# Patient Record
Sex: FEMALE | Race: BLACK OR AFRICAN AMERICAN | Employment: FULL TIME | URBAN - METROPOLITAN AREA
[De-identification: names, ages, dates, MRNs, and addresses within clinical notes are randomized per-mention and may not be internally consistent; named-entity substitution may affect disease eponyms.]

---

## 2021-07-15 ENCOUNTER — OFFICE VISIT (OUTPATIENT)
Dept: INTERNAL MEDICINE CLINIC | Facility: CLINIC | Age: 76
End: 2021-07-15
Payer: COMMERCIAL

## 2021-07-15 ENCOUNTER — LAB ENCOUNTER (OUTPATIENT)
Dept: LAB | Facility: HOSPITAL | Age: 76
End: 2021-07-15
Attending: INTERNAL MEDICINE
Payer: COMMERCIAL

## 2021-07-15 VITALS
WEIGHT: 205 LBS | RESPIRATION RATE: 14 BRPM | BODY MASS INDEX: 35.87 KG/M2 | HEART RATE: 110 BPM | SYSTOLIC BLOOD PRESSURE: 132 MMHG | DIASTOLIC BLOOD PRESSURE: 80 MMHG | OXYGEN SATURATION: 98 % | HEIGHT: 63.5 IN

## 2021-07-15 DIAGNOSIS — Z00.00 ADULT GENERAL MEDICAL EXAM: ICD-10-CM

## 2021-07-15 DIAGNOSIS — E11.9 TYPE 2 DIABETES MELLITUS WITHOUT COMPLICATION, WITHOUT LONG-TERM CURRENT USE OF INSULIN (HCC): ICD-10-CM

## 2021-07-15 DIAGNOSIS — I10 ESSENTIAL HYPERTENSION: ICD-10-CM

## 2021-07-15 DIAGNOSIS — R22.0 MILD TONGUE SWELLING: Primary | ICD-10-CM

## 2021-07-15 DIAGNOSIS — Z12.31 BREAST CANCER SCREENING BY MAMMOGRAM: ICD-10-CM

## 2021-07-15 LAB
ALBUMIN SERPL-MCNC: 3.4 G/DL (ref 3.4–5)
ALBUMIN/GLOB SERPL: 0.7 {RATIO} (ref 1–2)
ALP LIVER SERPL-CCNC: 129 U/L
ALT SERPL-CCNC: 30 U/L
ANION GAP SERPL CALC-SCNC: 7 MMOL/L (ref 0–18)
AST SERPL-CCNC: 24 U/L (ref 15–37)
BILIRUB SERPL-MCNC: 0.4 MG/DL (ref 0.1–2)
BUN BLD-MCNC: 13 MG/DL (ref 7–18)
BUN/CREAT SERPL: 15.5 (ref 10–20)
CALCIUM BLD-MCNC: 9.2 MG/DL (ref 8.5–10.1)
CHLORIDE SERPL-SCNC: 105 MMOL/L (ref 98–112)
CHOLEST SMN-MCNC: 174 MG/DL (ref ?–200)
CO2 SERPL-SCNC: 26 MMOL/L (ref 21–32)
CREAT BLD-MCNC: 0.84 MG/DL
CREAT UR-SCNC: 119 MG/DL
EST. AVERAGE GLUCOSE BLD GHB EST-MCNC: 243 MG/DL (ref 68–126)
GLOBULIN PLAS-MCNC: 4.6 G/DL (ref 2.8–4.4)
GLUCOSE BLD-MCNC: 163 MG/DL (ref 70–99)
HBA1C MFR BLD HPLC: 10.1 % (ref ?–5.7)
HDLC SERPL-MCNC: 56 MG/DL (ref 40–59)
LDLC SERPL CALC-MCNC: 100 MG/DL (ref ?–100)
M PROTEIN MFR SERPL ELPH: 8 G/DL (ref 6.4–8.2)
MICROALBUMIN UR-MCNC: 6.67 MG/DL
MICROALBUMIN/CREAT 24H UR-RTO: 56.1 UG/MG (ref ?–30)
NONHDLC SERPL-MCNC: 118 MG/DL (ref ?–130)
OSMOLALITY SERPL CALC.SUM OF ELEC: 290 MOSM/KG (ref 275–295)
PATIENT FASTING Y/N/NP: NO
PATIENT FASTING Y/N/NP: NO
POTASSIUM SERPL-SCNC: 3.9 MMOL/L (ref 3.5–5.1)
SODIUM SERPL-SCNC: 138 MMOL/L (ref 136–145)
TRIGL SERPL-MCNC: 102 MG/DL (ref 30–149)
TSI SER-ACNC: 1.18 MIU/ML (ref 0.36–3.74)
VLDLC SERPL CALC-MCNC: 17 MG/DL (ref 0–30)

## 2021-07-15 PROCEDURE — 3060F POS MICROALBUMINURIA REV: CPT | Performed by: INTERNAL MEDICINE

## 2021-07-15 PROCEDURE — 85027 COMPLETE CBC AUTOMATED: CPT

## 2021-07-15 PROCEDURE — 80053 COMPREHEN METABOLIC PANEL: CPT

## 2021-07-15 PROCEDURE — 99204 OFFICE O/P NEW MOD 45 MIN: CPT | Performed by: INTERNAL MEDICINE

## 2021-07-15 PROCEDURE — 3079F DIAST BP 80-89 MM HG: CPT | Performed by: INTERNAL MEDICINE

## 2021-07-15 PROCEDURE — 3008F BODY MASS INDEX DOCD: CPT | Performed by: INTERNAL MEDICINE

## 2021-07-15 PROCEDURE — 80061 LIPID PANEL: CPT

## 2021-07-15 PROCEDURE — 3075F SYST BP GE 130 - 139MM HG: CPT | Performed by: INTERNAL MEDICINE

## 2021-07-15 PROCEDURE — 84443 ASSAY THYROID STIM HORMONE: CPT

## 2021-07-15 PROCEDURE — 85060 BLOOD SMEAR INTERPRETATION: CPT

## 2021-07-15 PROCEDURE — 3061F NEG MICROALBUMINURIA REV: CPT | Performed by: INTERNAL MEDICINE

## 2021-07-15 PROCEDURE — 3046F HEMOGLOBIN A1C LEVEL >9.0%: CPT | Performed by: INTERNAL MEDICINE

## 2021-07-15 PROCEDURE — 82043 UR ALBUMIN QUANTITATIVE: CPT

## 2021-07-15 PROCEDURE — 82570 ASSAY OF URINE CREATININE: CPT

## 2021-07-15 PROCEDURE — 83036 HEMOGLOBIN GLYCOSYLATED A1C: CPT

## 2021-07-15 RX ORDER — ASPIRIN 81 MG/1
81 TABLET, CHEWABLE ORAL DAILY
COMMUNITY

## 2021-07-15 RX ORDER — FAMOTIDINE 20 MG/1
20 TABLET ORAL 2 TIMES DAILY
Qty: 30 TABLET | Refills: 0 | Status: SHIPPED | OUTPATIENT
Start: 2021-07-15 | End: 2021-07-22

## 2021-07-15 RX ORDER — EPINEPHRINE 0.3 MG/.3ML
0.3 INJECTION SUBCUTANEOUS ONCE
Qty: 1 EACH | Refills: 0 | Status: SHIPPED | OUTPATIENT
Start: 2021-07-15 | End: 2021-07-15

## 2021-07-15 RX ORDER — MAG HYDROX/ALUMINUM HYD/SIMETH 400-400-40
5000 SUSPENSION, ORAL (FINAL DOSE FORM) ORAL DAILY
COMMUNITY

## 2021-07-15 RX ORDER — PREDNISONE 20 MG/1
TABLET ORAL
Qty: 11 TABLET | Refills: 0 | Status: SHIPPED | OUTPATIENT
Start: 2021-07-15 | End: 2021-11-22 | Stop reason: ALTCHOICE

## 2021-07-15 RX ORDER — AMLODIPINE BESYLATE 10 MG/1
10 TABLET ORAL DAILY
COMMUNITY
End: 2021-11-09

## 2021-07-15 RX ORDER — GLIMEPIRIDE 4 MG/1
4 TABLET ORAL DAILY
COMMUNITY
Start: 2021-07-08 | End: 2021-11-09

## 2021-07-15 RX ORDER — AMPICILLIN TRIHYDRATE 250 MG
500 CAPSULE ORAL DAILY
COMMUNITY
End: 2021-11-22 | Stop reason: ALTCHOICE

## 2021-07-15 NOTE — PROGRESS NOTES
Teofilo Mckeon is a 77year old female. Patient presents with:  Establish Care: NP here to establish Care   Follow - Up: Tongue swollen since last night     HPI:   78-year-old pleasant lady with a past medical history of diabetes, hypertension here to estab Use: Never used    Alcohol use: Not Currently      Comment: socially drinks     Drug use: Never     Family History   Problem Relation Age of Onset   • Hypertension Mother    • Hypertension Father         Lisinopril              TONGUE SWELLING     REVIEW O Tenderness: There is no abdominal tenderness. Musculoskeletal:      Cervical back: Neck supple. Right lower leg: No edema. Left lower leg: No edema. Skin:     General: Skin is warm and dry.    Neurological:      General: No focal deficit prese ordered. We will try to obtain the colonoscopy report.   If everything is okay, I will see her back in few months for physical.  I have given her clear instruction to call 911 if she develops any tongue swelling that leads to shortness of breath or dysphag

## 2021-07-15 NOTE — PROGRESS NOTES
Colonoscopy done at [facility] on 2019 with Wood Dawson patient Normal and Repeat in 2023      Received [address] [place] but doesn't Recall dates,will call the office back with dates.

## 2021-07-16 ENCOUNTER — TELEPHONE (OUTPATIENT)
Dept: INTERNAL MEDICINE CLINIC | Facility: CLINIC | Age: 76
End: 2021-07-16

## 2021-07-16 LAB
DEPRECATED RDW RBC AUTO: 51.7 FL (ref 35.1–46.3)
ERYTHROCYTE [DISTWIDTH] IN BLOOD BY AUTOMATED COUNT: 17.6 % (ref 11–15)
HCT VFR BLD AUTO: 49.4 %
HGB BLD-MCNC: 15.5 G/DL
MCH RBC QN AUTO: 26 PG (ref 26–34)
MCHC RBC AUTO-ENTMCNC: 31.4 G/DL (ref 31–37)
MCV RBC AUTO: 82.9 FL
PLATELET # BLD AUTO: 641 10(3)UL (ref 150–450)
RBC # BLD AUTO: 5.96 X10(6)UL
WBC # BLD AUTO: 12.2 X10(3) UL (ref 4–11)

## 2021-07-16 NOTE — TELEPHONE ENCOUNTER
Summer Lan from [facility] calling to ask if it is okay to change Epipen rx to [address] because it is cheaper for the patient. It is the same concentration as Epipen.     EPINEPHrine (EPIPEN 2-SKIP) 0.3 MG/0.3ML Injection Solution Auto-injector 1 each 0 7/15/2021 7

## 2021-07-20 ENCOUNTER — HOSPITAL ENCOUNTER (OUTPATIENT)
Dept: MAMMOGRAPHY | Facility: HOSPITAL | Age: 76
Discharge: HOME | End: 2021-07-20
Attending: INTERNAL MEDICINE
Payer: COMMERCIAL

## 2021-07-20 DIAGNOSIS — Z12.31 BREAST CANCER SCREENING BY MAMMOGRAM: ICD-10-CM

## 2021-07-20 PROCEDURE — 77063 BREAST TOMOSYNTHESIS BI: CPT | Performed by: INTERNAL MEDICINE

## 2021-07-20 PROCEDURE — 77067 SCR MAMMO BI INCL CAD: CPT | Performed by: INTERNAL MEDICINE

## 2021-07-22 RX ORDER — FAMOTIDINE 20 MG/1
TABLET ORAL
Qty: 30 TABLET | Refills: 0 | Status: SHIPPED | OUTPATIENT
Start: 2021-07-22 | End: 2021-11-22 | Stop reason: ALTCHOICE

## 2021-07-27 ENCOUNTER — APPOINTMENT (OUTPATIENT)
Dept: HEMATOLOGY/ONCOLOGY | Facility: HOSPITAL | Age: 76
End: 2021-07-27
Attending: INTERNAL MEDICINE
Payer: COMMERCIAL

## 2021-07-27 VITALS
SYSTOLIC BLOOD PRESSURE: 167 MMHG | HEART RATE: 103 BPM | OXYGEN SATURATION: 98 % | HEIGHT: 63.5 IN | TEMPERATURE: 98 F | RESPIRATION RATE: 16 BRPM | BODY MASS INDEX: 35.52 KG/M2 | WEIGHT: 203 LBS | DIASTOLIC BLOOD PRESSURE: 89 MMHG

## 2021-07-27 DIAGNOSIS — D72.9 NEUTROPHILIC LEUKOCYTOSIS: ICD-10-CM

## 2021-07-27 DIAGNOSIS — D75.839 THROMBOCYTOSIS: Primary | ICD-10-CM

## 2021-07-27 PROCEDURE — 99244 OFF/OP CNSLTJ NEW/EST MOD 40: CPT | Performed by: INTERNAL MEDICINE

## 2021-07-27 NOTE — CONSULTS
[facility] History and Physical    Patient Name: Alfredo Echols   YOB: 1945   Medical Record Number: [id number]   CSN: [id number]   Attending Physician:  Sharon Young MD       Date of Visit: 7/27/2021     Chief Complaint:   Thrombocytosis, le melanoma)    Social History:  Marital status: lives with sister and niece, one son, lives out of town  Smoking: non smoker  ETOH: hardly ever  Work: ([employer])    Current Medications:    Current Outpatient Medications:   •  FAMOTIDIN affect      Laboratory reviewed:    Lab Results   Component Value Date    WBC 12.2 (H) 07/15/2021    RBC 5.96 (H) 07/15/2021    HGB 15.5 07/15/2021    HCT 49.4 (H) 07/15/2021    MCV 82.9 07/15/2021    MCH 26.0 07/15/2021    MCHC 31.4 07/15/2021    RDW 17. 6

## 2021-08-09 RX ORDER — FAMOTIDINE 20 MG/1
TABLET ORAL
Qty: 30 TABLET | Refills: 0 | OUTPATIENT
Start: 2021-08-09

## 2021-08-09 NOTE — TELEPHONE ENCOUNTER
Called patient to inquire if she is supposed to be taking pepcid daily? Per the office visit notes, it appears to be only prescribed once for an episode of mild tongue swellin.  Mild tongue swelling   -She reports that her tongue was swollen today

## 2021-08-10 NOTE — TELEPHONE ENCOUNTER
Patient contacted@ [phone]. Informed she no longer needs to take famotadine. Patient verbalized understanding.

## 2021-08-14 ENCOUNTER — LAB ENCOUNTER (OUTPATIENT)
Dept: LAB | Facility: HOSPITAL | Age: 76
End: 2021-08-14
Attending: INTERNAL MEDICINE
Payer: COMMERCIAL

## 2021-08-14 DIAGNOSIS — D75.839 THROMBOCYTOSIS: ICD-10-CM

## 2021-08-14 LAB
BASOPHILS # BLD AUTO: 0.09 X10(3) UL (ref 0–0.2)
BASOPHILS NFR BLD AUTO: 1 %
DEPRECATED HBV CORE AB SER IA-ACNC: 16.6 NG/ML
DEPRECATED RDW RBC AUTO: 56.3 FL (ref 35.1–46.3)
EOSINOPHIL # BLD AUTO: 0.4 X10(3) UL (ref 0–0.7)
EOSINOPHIL NFR BLD AUTO: 4.3 %
ERYTHROCYTE [DISTWIDTH] IN BLOOD BY AUTOMATED COUNT: 18.9 % (ref 11–15)
ERYTHROCYTE [SEDIMENTATION RATE] IN BLOOD: 10 MM/HR
HCT VFR BLD AUTO: 49.4 %
HGB BLD-MCNC: 15.3 G/DL
HGB RETIC QN AUTO: 29.6 PG (ref 28.2–36.6)
IGA SERPL-MCNC: 297 MG/DL (ref 70–312)
IGM SERPL-MCNC: 100 MG/DL (ref 43–279)
IMM GRANULOCYTES # BLD AUTO: 0.1 X10(3) UL (ref 0–1)
IMM GRANULOCYTES NFR BLD: 1.1 %
IMM RETICS NFR: 0.31 RATIO (ref 0.1–0.3)
IMMUNOGLOBULIN PNL SER-MCNC: 1490 MG/DL (ref 791–1643)
IRON SATURATION: 10 %
IRON SERPL-MCNC: 37 UG/DL
LYMPHOCYTES # BLD AUTO: 2.38 X10(3) UL (ref 1–4)
LYMPHOCYTES NFR BLD AUTO: 25.7 %
MCH RBC QN AUTO: 26.1 PG (ref 26–34)
MCHC RBC AUTO-ENTMCNC: 31 G/DL (ref 31–37)
MCV RBC AUTO: 84.3 FL
MONOCYTES # BLD AUTO: 0.97 X10(3) UL (ref 0.1–1)
MONOCYTES NFR BLD AUTO: 10.5 %
NEUTROPHILS # BLD AUTO: 5.31 X10 (3) UL (ref 1.5–7.7)
NEUTROPHILS # BLD AUTO: 5.31 X10(3) UL (ref 1.5–7.7)
NEUTROPHILS NFR BLD AUTO: 57.4 %
PLATELET # BLD AUTO: 689 10(3)UL (ref 150–450)
RBC # BLD AUTO: 5.86 X10(6)UL
RETICS # AUTO: 85.6 X10(3) UL (ref 22.5–147.5)
RETICS/RBC NFR AUTO: 1.5 %
TOTAL IRON BINDING CAPACITY: 368 UG/DL (ref 240–450)
TRANSFERRIN SERPL-MCNC: 247 MG/DL (ref 200–360)
VIT B12 SERPL-MCNC: 387 PG/ML (ref 193–986)
WBC # BLD AUTO: 9.3 X10(3) UL (ref 4–11)

## 2021-08-14 PROCEDURE — 82668 ASSAY OF ERYTHROPOIETIN: CPT

## 2021-08-14 PROCEDURE — 86225 DNA ANTIBODY NATIVE: CPT

## 2021-08-14 PROCEDURE — 85045 AUTOMATED RETICULOCYTE COUNT: CPT

## 2021-08-14 PROCEDURE — 82728 ASSAY OF FERRITIN: CPT

## 2021-08-14 PROCEDURE — 36415 COLL VENOUS BLD VENIPUNCTURE: CPT

## 2021-08-14 PROCEDURE — 85652 RBC SED RATE AUTOMATED: CPT

## 2021-08-14 PROCEDURE — 82785 ASSAY OF IGE: CPT

## 2021-08-14 PROCEDURE — 86334 IMMUNOFIX E-PHORESIS SERUM: CPT

## 2021-08-14 PROCEDURE — 86039 ANTINUCLEAR ANTIBODIES (ANA): CPT

## 2021-08-14 PROCEDURE — 82784 ASSAY IGA/IGD/IGG/IGM EACH: CPT

## 2021-08-14 PROCEDURE — 84165 PROTEIN E-PHORESIS SERUM: CPT

## 2021-08-14 PROCEDURE — 86038 ANTINUCLEAR ANTIBODIES: CPT

## 2021-08-14 PROCEDURE — 83540 ASSAY OF IRON: CPT

## 2021-08-14 PROCEDURE — 86235 NUCLEAR ANTIGEN ANTIBODY: CPT

## 2021-08-14 PROCEDURE — 84466 ASSAY OF TRANSFERRIN: CPT

## 2021-08-14 PROCEDURE — 83883 ASSAY NEPHELOMETRY NOT SPEC: CPT

## 2021-08-14 PROCEDURE — 82607 VITAMIN B-12: CPT

## 2021-08-14 PROCEDURE — 85025 COMPLETE CBC W/AUTO DIFF WBC: CPT

## 2021-08-15 LAB — ERYTHROPOIETIN (EPO): 4 MU/ML

## 2021-08-16 LAB
IGE SERPL-ACNC: 818 KU/L (ref 2–214)
KAPPA LC FREE SER-MCNC: 2.83 MG/DL (ref 0.33–1.94)
KAPPA LC FREE/LAMBDA FREE SER NEPH: 1.4 {RATIO} (ref 0.26–1.65)
LAMBDA LC FREE SERPL-MCNC: 2.02 MG/DL (ref 0.57–2.63)
NUCLEAR IGG TITR SER IF: POSITIVE {TITER}

## 2021-08-17 LAB
ALBUMIN SERPL ELPH-MCNC: 3.63 G/DL (ref 3.75–5.21)
ALBUMIN/GLOB SERPL: 1.04 {RATIO} (ref 1–2)
ALPHA1 GLOB SERPL ELPH-MCNC: 0.33 G/DL (ref 0.19–0.46)
ALPHA2 GLOB SERPL ELPH-MCNC: 0.88 G/DL (ref 0.48–1.05)
B-GLOBULIN SERPL ELPH-MCNC: 1.02 G/DL (ref 0.68–1.23)
GAMMA GLOB SERPL ELPH-MCNC: 1.24 G/DL (ref 0.62–1.7)
M PROTEIN MFR SERPL ELPH: 7.1 G/DL (ref 6.4–8.2)

## 2021-08-18 LAB
DSDNA AB TITR SER: <10 {TITER}
ENA SM IGG SER QL: NEGATIVE
ENA SM+RNP AB SER QL: NEGATIVE
ENA SS-A AB SER QL IA: NEGATIVE
ENA SS-B AB SER QL IA: NEGATIVE

## 2021-08-19 LAB — ANA NUCLEOLAR TITR SER IF: 80 {TITER}

## 2021-08-20 ENCOUNTER — TELEPHONE (OUTPATIENT)
Dept: HEMATOLOGY/ONCOLOGY | Facility: HOSPITAL | Age: 76
End: 2021-08-20

## 2021-08-20 NOTE — TELEPHONE ENCOUNTER
Patient notified that her vitamin B12 and ESR are normal.    WBC is now normal.  Platelets still elevated in the 600s. She is iron deficient, but not anemic. Her BERNADETTE work-up is negative. Her IgG/A/M and ROSY is normal but Ig E is elevated.   Will h

## 2021-08-20 NOTE — TELEPHONE ENCOUNTER
Patient got blood work recently and she called to ask Dr. Steven Burciaga to call her with the results. Please call patient. Thank you.

## 2021-08-23 ENCOUNTER — TELEPHONE (OUTPATIENT)
Dept: HEMATOLOGY/ONCOLOGY | Facility: HOSPITAL | Age: 76
End: 2021-08-23

## 2021-08-23 DIAGNOSIS — D75.839 THROMBOCYTOSIS: Primary | ICD-10-CM

## 2021-08-23 NOTE — TELEPHONE ENCOUNTER
LVMTCB    Labs show iron deficiency, resolution of elevated WBC. Start iron tabs, repeat CBC and iron studies in three mo along with IgE (elevated likely due to her allergic reaction). BERNADETTE midly elevated, monitor, does not appear to be significant.      Elizabeth Finnegan

## 2021-09-16 ENCOUNTER — PATIENT MESSAGE (OUTPATIENT)
Dept: ENDOCRINOLOGY CLINIC | Facility: CLINIC | Age: 76
End: 2021-09-16

## 2021-09-16 NOTE — TELEPHONE ENCOUNTER
Replied to patient's message. Dr. Burgess Zaragoza booked on 9/29. Awaiting patient response to see if she wants to reschedule for a different date.

## 2021-09-16 NOTE — TELEPHONE ENCOUNTER
From: Yanique Abbott  To: Henry Santo MD  Sent: 9/16/2021 8:34 AM CDT  Subject: Appointment change    I would like to change my appointment on September 29, 2021 from 11:30a to 2:30p.

## 2021-09-24 NOTE — TELEPHONE ENCOUNTER
Since she needs a later time, can please reschedule to first available/ can also get on the wait list if she likes  Thanks

## 2021-09-24 NOTE — TELEPHONE ENCOUNTER
Advised patient to call scheduling to reschedule appointment. Per Dr. Pichardo Lav: first available.

## 2021-09-24 NOTE — TELEPHONE ENCOUNTER
Dr. Amairani Anderson    Please see patient's message and advise. Consult appt canceled on 9/29. Do you want to overbook or see patient first available?

## 2021-09-24 NOTE — TELEPHONE ENCOUNTER
Replied to patient's Takumii Swedenhart message with first available per Dr. Martina Gutierrez below. Awaiting response.

## 2021-11-09 RX ORDER — GLIMEPIRIDE 4 MG/1
TABLET ORAL
Qty: 90 TABLET | Refills: 2 | Status: SHIPPED | OUTPATIENT
Start: 2021-11-09

## 2021-11-09 RX ORDER — AMLODIPINE BESYLATE 10 MG/1
TABLET ORAL
Qty: 90 TABLET | Refills: 2 | Status: SHIPPED | OUTPATIENT
Start: 2021-11-09

## 2021-11-17 ENCOUNTER — HOSPITAL ENCOUNTER (EMERGENCY)
Facility: HOSPITAL | Age: 76
Discharge: HOME | End: 2021-11-17
Payer: COMMERCIAL

## 2021-11-17 ENCOUNTER — APPOINTMENT (OUTPATIENT)
Dept: GENERAL RADIOLOGY | Facility: HOSPITAL | Age: 76
End: 2021-11-17
Payer: COMMERCIAL

## 2021-11-17 ENCOUNTER — LAB ENCOUNTER (OUTPATIENT)
Dept: LAB | Facility: HOSPITAL | Age: 76
End: 2021-11-17
Attending: INTERNAL MEDICINE
Payer: COMMERCIAL

## 2021-11-17 ENCOUNTER — APPOINTMENT (OUTPATIENT)
Dept: ULTRASOUND IMAGING | Facility: HOSPITAL | Age: 76
End: 2021-11-17
Attending: EMERGENCY MEDICINE
Payer: COMMERCIAL

## 2021-11-17 VITALS
OXYGEN SATURATION: 98 % | WEIGHT: 200 LBS | BODY MASS INDEX: 35 KG/M2 | DIASTOLIC BLOOD PRESSURE: 78 MMHG | RESPIRATION RATE: 18 BRPM | HEART RATE: 98 BPM | SYSTOLIC BLOOD PRESSURE: 152 MMHG | TEMPERATURE: 97 F

## 2021-11-17 DIAGNOSIS — M25.562 LEFT KNEE PAIN, UNSPECIFIED CHRONICITY: Primary | ICD-10-CM

## 2021-11-17 DIAGNOSIS — D72.9 NEUTROPHILIC LEUKOCYTOSIS: ICD-10-CM

## 2021-11-17 DIAGNOSIS — D75.839 THROMBOCYTOSIS: ICD-10-CM

## 2021-11-17 PROCEDURE — 73560 X-RAY EXAM OF KNEE 1 OR 2: CPT

## 2021-11-17 PROCEDURE — 83540 ASSAY OF IRON: CPT

## 2021-11-17 PROCEDURE — 99284 EMERGENCY DEPT VISIT MOD MDM: CPT

## 2021-11-17 PROCEDURE — 85025 COMPLETE CBC W/AUTO DIFF WBC: CPT

## 2021-11-17 PROCEDURE — 36415 COLL VENOUS BLD VENIPUNCTURE: CPT

## 2021-11-17 PROCEDURE — 82785 ASSAY OF IGE: CPT

## 2021-11-17 PROCEDURE — 84466 ASSAY OF TRANSFERRIN: CPT

## 2021-11-17 PROCEDURE — 93971 EXTREMITY STUDY: CPT | Performed by: EMERGENCY MEDICINE

## 2021-11-17 RX ORDER — NAPROXEN 500 MG/1
500 TABLET ORAL 2 TIMES DAILY PRN
Qty: 14 TABLET | Refills: 0 | Status: SHIPPED | OUTPATIENT
Start: 2021-11-17 | End: 2021-11-24

## 2021-11-17 NOTE — ED PROVIDER NOTES
Patient Seen in: [facility] AND [facility] Emergency Department      History   Patient presents with:  Knee Pain    Stated Complaint: L knee pain    Subjective:   HPI    Patient is a 73-year-old female that complains of 2 weeks of discomfort in her left knee sta to display       XR KNEE (1 OR 2 VIEWS), LEFT (CPT=73560)    Result Date: 11/17/2021  CONCLUSION:  1. Mild tricompartmental osteoarthritis of the left knee.     Dictated by (CST): Gloria Nuñez MD on 11/17/2021 at 11:22 AM     Finalized by (CST):

## 2021-11-17 NOTE — ED QUICK NOTES
Pt states pain with ambulation to lt knee with some swelling, mostly to the outside x 2 weeks. States took ibuprofen once it did help a little but not much. States has been wearing a knee brace, which seems it help some. Denies injury or trauma.  Denies brittany

## 2021-11-17 NOTE — ED INITIAL ASSESSMENT (HPI)
Patient states her left knee started to have pain x 2 weeks ago. With some swelling. She is ambulatory in triage and has been wearing a knee brace, denies trauma, pain worse with movement.

## 2021-11-22 ENCOUNTER — OFFICE VISIT (OUTPATIENT)
Dept: HEMATOLOGY/ONCOLOGY | Facility: HOSPITAL | Age: 76
End: 2021-11-22
Attending: INTERNAL MEDICINE
Payer: COMMERCIAL

## 2021-11-22 ENCOUNTER — LAB ENCOUNTER (OUTPATIENT)
Dept: LAB | Facility: HOSPITAL | Age: 76
End: 2021-11-22
Attending: INTERNAL MEDICINE
Payer: COMMERCIAL

## 2021-11-22 VITALS
BODY MASS INDEX: 35.17 KG/M2 | DIASTOLIC BLOOD PRESSURE: 76 MMHG | OXYGEN SATURATION: 97 % | RESPIRATION RATE: 18 BRPM | HEART RATE: 90 BPM | TEMPERATURE: 99 F | SYSTOLIC BLOOD PRESSURE: 140 MMHG | WEIGHT: 201 LBS | HEIGHT: 63.5 IN

## 2021-11-22 DIAGNOSIS — D75.1 POLYCYTHEMIA: Primary | ICD-10-CM

## 2021-11-22 DIAGNOSIS — D75.839 THROMBOCYTOSIS: ICD-10-CM

## 2021-11-22 DIAGNOSIS — D75.1 POLYCYTHEMIA: ICD-10-CM

## 2021-11-22 PROCEDURE — 99214 OFFICE O/P EST MOD 30 MIN: CPT | Performed by: INTERNAL MEDICINE

## 2021-11-22 PROCEDURE — 81270 JAK2 GENE: CPT

## 2021-11-22 PROCEDURE — 36415 COLL VENOUS BLD VENIPUNCTURE: CPT

## 2021-11-22 RX ORDER — BUPRENORPHINE HCL 8 MG/1
1 TABLET SUBLINGUAL DAILY
COMMUNITY
End: 2022-01-04

## 2021-11-22 NOTE — PROGRESS NOTES
Cancer Center Progress Note    Patient Name: Yanique Abbott   YOB: 1945   Medical Record Number: [id number]   Attending Physician: Chloé Christie M.D. Chief Complaint:   Thrombocytosis, leukocytosis     Oncology History:  77year old seen for 140/76 (BP Location: Left arm, Patient Position: Sitting, Cuff Size: large)   Pulse 90   Temp 99.1 °F (37.3 °C) (Oral)   Resp 18   Ht 1.613 m (5' 3.5\")   Wt 91.2 kg (201 lb)   SpO2 97%   BMI 35.05 kg/m²     Physical Examination:  Performance Status:  Gene

## 2021-12-06 ENCOUNTER — TELEPHONE (OUTPATIENT)
Dept: HEMATOLOGY/ONCOLOGY | Facility: HOSPITAL | Age: 76
End: 2021-12-06

## 2021-12-06 NOTE — TELEPHONE ENCOUNTER
Scheduled lorena for Monday 12/13 to meet with Dr Branden Alonzo and review the results of her blood test.

## 2021-12-13 ENCOUNTER — OFFICE VISIT (OUTPATIENT)
Dept: HEMATOLOGY/ONCOLOGY | Facility: HOSPITAL | Age: 76
End: 2021-12-13
Attending: INTERNAL MEDICINE
Payer: COMMERCIAL

## 2021-12-13 ENCOUNTER — OFFICE VISIT (OUTPATIENT)
Dept: ENDOCRINOLOGY CLINIC | Facility: CLINIC | Age: 76
End: 2021-12-13
Payer: COMMERCIAL

## 2021-12-13 VITALS
DIASTOLIC BLOOD PRESSURE: 81 MMHG | HEART RATE: 83 BPM | WEIGHT: 200 LBS | SYSTOLIC BLOOD PRESSURE: 138 MMHG | HEIGHT: 63.5 IN | BODY MASS INDEX: 35 KG/M2

## 2021-12-13 VITALS
OXYGEN SATURATION: 97 % | RESPIRATION RATE: 16 BRPM | HEART RATE: 89 BPM | WEIGHT: 200 LBS | BODY MASS INDEX: 35 KG/M2 | DIASTOLIC BLOOD PRESSURE: 83 MMHG | HEIGHT: 63.5 IN | SYSTOLIC BLOOD PRESSURE: 163 MMHG | TEMPERATURE: 98 F

## 2021-12-13 DIAGNOSIS — E11.69 TYPE 2 DIABETES MELLITUS WITH OTHER SPECIFIED COMPLICATION, UNSPECIFIED WHETHER LONG TERM INSULIN USE (HCC): Primary | ICD-10-CM

## 2021-12-13 DIAGNOSIS — D75.1 POLYCYTHEMIA: Primary | ICD-10-CM

## 2021-12-13 DIAGNOSIS — E78.5 DYSLIPIDEMIA: ICD-10-CM

## 2021-12-13 PROCEDURE — 3075F SYST BP GE 130 - 139MM HG: CPT | Performed by: INTERNAL MEDICINE

## 2021-12-13 PROCEDURE — 83036 HEMOGLOBIN GLYCOSYLATED A1C: CPT | Performed by: INTERNAL MEDICINE

## 2021-12-13 PROCEDURE — 99214 OFFICE O/P EST MOD 30 MIN: CPT | Performed by: INTERNAL MEDICINE

## 2021-12-13 PROCEDURE — 3051F HG A1C>EQUAL 7.0%<8.0%: CPT | Performed by: INTERNAL MEDICINE

## 2021-12-13 PROCEDURE — 82947 ASSAY GLUCOSE BLOOD QUANT: CPT | Performed by: INTERNAL MEDICINE

## 2021-12-13 PROCEDURE — 36416 COLLJ CAPILLARY BLOOD SPEC: CPT | Performed by: INTERNAL MEDICINE

## 2021-12-13 PROCEDURE — 3079F DIAST BP 80-89 MM HG: CPT | Performed by: INTERNAL MEDICINE

## 2021-12-13 PROCEDURE — 3008F BODY MASS INDEX DOCD: CPT | Performed by: INTERNAL MEDICINE

## 2021-12-13 PROCEDURE — 99243 OFF/OP CNSLTJ NEW/EST LOW 30: CPT | Performed by: INTERNAL MEDICINE

## 2021-12-13 RX ORDER — BLOOD SUGAR DIAGNOSTIC
STRIP MISCELLANEOUS
COMMUNITY
Start: 2021-10-02

## 2021-12-13 RX ORDER — IBUPROFEN 600 MG/1
TABLET ORAL
COMMUNITY
Start: 2021-11-17 | End: 2022-01-04

## 2021-12-13 RX ORDER — HYDROXYUREA 500 MG/1
500 CAPSULE ORAL 2 TIMES DAILY
Qty: 60 CAPSULE | Refills: 3 | Status: SHIPPED | OUTPATIENT
Start: 2021-12-13 | End: 2022-02-11

## 2021-12-13 RX ORDER — EPINEPHRINE 0.3 MG/.3ML
INJECTION SUBCUTANEOUS
COMMUNITY
Start: 2021-07-16

## 2021-12-13 NOTE — H&P
New Patient Visit - Diabetes    CONSULT - Reason for Visit:  Diabetes management.     Requesting Physician: Dr. Díaz Spotted:  Patient presents with:  Consult: diabetes consult        HISTORY OF PRESENT ILLNESS:   Vale Toby is a 77 year o Hypertension        PAST SURGICAL HISTORY:   Past Surgical History:   Procedure Laterality Date   • CATARACT EXTRACTION  2019    Right eye    • DILATION/CURETTAGE,DIAGNOSTIC     • DAYANNA BIOPSY STEREO NODULE 1 SITE RIGHT (CPT=19081)      2015?  results benign pupils  Throat/Neck: normal sound to voice. Normal hearing, normal speech  Respiratory:  Speaking in full sentences, non-labored.  no increased work of breathing, no audible wheezing    Skin:  normal moisture and skin texture, no visible lesions  Hair and n questions.      RTC in 3-4 months    Orders Placed This Encounter      POC HemoCue Glucose 201 (Finger stick glucose)      POC Glycohemoglobin [01220]        12/13/2021  Sergei Pemberton MD

## 2021-12-13 NOTE — PROGRESS NOTES
Cancer Center Progress Note    Patient Name: Phuc Lema   YOB: 1945   Medical Record Number: [id number]   Attending Physician: Kaykay Cuevas M.D. Chief Complaint:   Thrombocytosis  leukocytosis   MPN    Oncology History:  77year old see TEST In Vitro Strip, , Disp: , Rfl:   •  EPINEPHrine 0.3 MG/0.3ML Injection Solution Auto-injector, INJECT 0.3 ML INTO THE MUSCLE ONE TIME FOR 1 DOSE AS DIRECTED, Disp: , Rfl:     Allergies:    Lisinopril              TONGUE SWELLING     Review of Systems: cardiovascular risk, increase to BID if vasomotor sx      MDM: mod,medication management, progressive dx    Sharon Young MD'

## 2021-12-14 ENCOUNTER — TELEPHONE (OUTPATIENT)
Dept: HEMATOLOGY/ONCOLOGY | Facility: HOSPITAL | Age: 76
End: 2021-12-14

## 2021-12-14 DIAGNOSIS — D72.9 NEUTROPHILIC LEUKOCYTOSIS: ICD-10-CM

## 2021-12-14 DIAGNOSIS — D75.839 THROMBOCYTOSIS: ICD-10-CM

## 2021-12-14 DIAGNOSIS — D75.1 POLYCYTHEMIA: Primary | ICD-10-CM

## 2021-12-14 PROBLEM — D45 POLYCYTHEMIA VERA (HCC): Status: ACTIVE | Noted: 2021-12-14

## 2021-12-14 NOTE — TELEPHONE ENCOUNTER
Dr Carolann Sanchez reached out to [facility]. Discussed a bone marrow biopsy d/t the angioedema of the tongue. Pt would like to proceed. Will order and schedule.

## 2021-12-27 ENCOUNTER — OFFICE VISIT (OUTPATIENT)
Dept: HEMATOLOGY/ONCOLOGY | Facility: HOSPITAL | Age: 76
End: 2021-12-27
Attending: INTERNAL MEDICINE
Payer: COMMERCIAL

## 2021-12-27 VITALS — SYSTOLIC BLOOD PRESSURE: 131 MMHG | DIASTOLIC BLOOD PRESSURE: 77 MMHG | HEART RATE: 106 BPM

## 2021-12-27 VITALS
HEART RATE: 99 BPM | TEMPERATURE: 99 F | SYSTOLIC BLOOD PRESSURE: 165 MMHG | RESPIRATION RATE: 16 BRPM | OXYGEN SATURATION: 98 % | DIASTOLIC BLOOD PRESSURE: 82 MMHG

## 2021-12-27 DIAGNOSIS — D45 POLYCYTHEMIA VERA (HCC): Primary | ICD-10-CM

## 2021-12-27 DIAGNOSIS — D75.1 POLYCYTHEMIA: ICD-10-CM

## 2021-12-27 DIAGNOSIS — Z98.890 STATUS POST PHLEBECTOMY: ICD-10-CM

## 2021-12-27 PROCEDURE — 36415 COLL VENOUS BLD VENIPUNCTURE: CPT

## 2021-12-27 PROCEDURE — 99195 PHLEBOTOMY: CPT

## 2021-12-27 PROCEDURE — 99214 OFFICE O/P EST MOD 30 MIN: CPT | Performed by: INTERNAL MEDICINE

## 2021-12-27 NOTE — PROGRESS NOTES
Patient to infusion for therapeutic phlebotomy. Teaching done regarding procedure and rationale for therapeutic phlebotomy.    Lab Results   Component Value Date    HGB 16.8 (H) 12/27/2021    HCT 53.5 (H) 12/27/2021      Lab Results   Component Value Date

## 2021-12-27 NOTE — PROGRESS NOTES
Cancer Center Progress Note    Patient Name: Shree Cuadra   YOB: 1945   Medical Record Number: [id number]   Attending Physician: Carroll Jones M.D. Chief Complaint:   Thrombocytosis  leukocytosis   MPN  Phlebotomy   hydrea monitoring    On (5000 UT) Oral Cap, Take 5,000 Units by mouth daily. , Disp: , Rfl:   •  ibuprofen 600 MG Oral Tab, , Disp: , Rfl:   •  Multiple Vitamins-Minerals (CENTRUM SILVER) Oral Tab, Take 1 tablet by mouth daily. , Disp: , Rfl:     Allergies:    Lisinopril

## 2022-01-04 ENCOUNTER — NURSE ONLY (OUTPATIENT)
Dept: HEMATOLOGY/ONCOLOGY | Facility: HOSPITAL | Age: 77
End: 2022-01-04
Attending: INTERNAL MEDICINE
Payer: COMMERCIAL

## 2022-01-04 ENCOUNTER — OFFICE VISIT (OUTPATIENT)
Dept: INTERNAL MEDICINE CLINIC | Facility: CLINIC | Age: 77
End: 2022-01-04
Payer: COMMERCIAL

## 2022-01-04 ENCOUNTER — LAB ENCOUNTER (OUTPATIENT)
Dept: LAB | Facility: HOSPITAL | Age: 77
End: 2022-01-04
Attending: INTERNAL MEDICINE
Payer: COMMERCIAL

## 2022-01-04 VITALS
TEMPERATURE: 99 F | HEART RATE: 95 BPM | DIASTOLIC BLOOD PRESSURE: 74 MMHG | SYSTOLIC BLOOD PRESSURE: 130 MMHG | RESPIRATION RATE: 16 BRPM | OXYGEN SATURATION: 97 %

## 2022-01-04 VITALS
DIASTOLIC BLOOD PRESSURE: 70 MMHG | RESPIRATION RATE: 14 BRPM | OXYGEN SATURATION: 98 % | BODY MASS INDEX: 34.65 KG/M2 | SYSTOLIC BLOOD PRESSURE: 122 MMHG | HEART RATE: 100 BPM | HEIGHT: 63.5 IN | WEIGHT: 198 LBS

## 2022-01-04 DIAGNOSIS — D45 POLYCYTHEMIA VERA (HCC): Primary | ICD-10-CM

## 2022-01-04 DIAGNOSIS — Z00.00 ADULT GENERAL MEDICAL EXAM: Primary | ICD-10-CM

## 2022-01-04 DIAGNOSIS — Z11.59 ENCOUNTER FOR SCREENING FOR OTHER VIRAL DISEASES: ICD-10-CM

## 2022-01-04 PROCEDURE — 3078F DIAST BP <80 MM HG: CPT | Performed by: INTERNAL MEDICINE

## 2022-01-04 PROCEDURE — 99397 PER PM REEVAL EST PAT 65+ YR: CPT | Performed by: INTERNAL MEDICINE

## 2022-01-04 PROCEDURE — 3074F SYST BP LT 130 MM HG: CPT | Performed by: INTERNAL MEDICINE

## 2022-01-04 PROCEDURE — 99195 PHLEBOTOMY: CPT

## 2022-01-04 PROCEDURE — 3008F BODY MASS INDEX DOCD: CPT | Performed by: INTERNAL MEDICINE

## 2022-01-04 RX ORDER — ROSUVASTATIN CALCIUM 5 MG/1
5 TABLET, COATED ORAL NIGHTLY
Qty: 90 TABLET | Refills: 1 | Status: SHIPPED | OUTPATIENT
Start: 2022-01-04 | End: 2022-04-04

## 2022-01-04 NOTE — PROGRESS NOTES
Per patient she saw Mary Littlejohn in 9/2021 for her Dilated Eye Exam,Will call and request report.

## 2022-01-05 LAB — SARS-COV-2 RNA RESP QL NAA+PROBE: NOT DETECTED

## 2022-01-07 ENCOUNTER — HOSPITAL ENCOUNTER (OUTPATIENT)
Dept: CT IMAGING | Facility: HOSPITAL | Age: 77
Discharge: HOME | End: 2022-01-07
Attending: INTERNAL MEDICINE
Payer: COMMERCIAL

## 2022-01-07 VITALS
HEIGHT: 63 IN | BODY MASS INDEX: 35.08 KG/M2 | WEIGHT: 198 LBS | SYSTOLIC BLOOD PRESSURE: 129 MMHG | OXYGEN SATURATION: 99 % | HEART RATE: 86 BPM | DIASTOLIC BLOOD PRESSURE: 69 MMHG | RESPIRATION RATE: 18 BRPM

## 2022-01-07 DIAGNOSIS — D45 POLYCYTHEMIA VERA (HCC): Primary | ICD-10-CM

## 2022-01-07 DIAGNOSIS — D75.839 THROMBOCYTOSIS: ICD-10-CM

## 2022-01-07 DIAGNOSIS — D72.9 NEUTROPHILIC LEUKOCYTOSIS: ICD-10-CM

## 2022-01-07 DIAGNOSIS — D75.1 POLYCYTHEMIA: ICD-10-CM

## 2022-01-07 PROCEDURE — 85097 BONE MARROW INTERPRETATION: CPT | Performed by: INTERNAL MEDICINE

## 2022-01-07 PROCEDURE — 38222 DX BONE MARROW BX & ASPIR: CPT | Performed by: INTERNAL MEDICINE

## 2022-01-07 PROCEDURE — 77012 CT SCAN FOR NEEDLE BIOPSY: CPT | Performed by: INTERNAL MEDICINE

## 2022-01-07 PROCEDURE — 88360 TUMOR IMMUNOHISTOCHEM/MANUAL: CPT | Performed by: INTERNAL MEDICINE

## 2022-01-07 PROCEDURE — 99152 MOD SED SAME PHYS/QHP 5/>YRS: CPT | Performed by: INTERNAL MEDICINE

## 2022-01-07 PROCEDURE — 88313 SPECIAL STAINS GROUP 2: CPT | Performed by: INTERNAL MEDICINE

## 2022-01-07 PROCEDURE — 88237 TISSUE CULTURE BONE MARROW: CPT | Performed by: RADIOLOGY

## 2022-01-07 PROCEDURE — 88184 FLOWCYTOMETRY/ TC 1 MARKER: CPT | Performed by: RADIOLOGY

## 2022-01-07 PROCEDURE — 88189 FLOWCYTOMETRY/READ 16 & >: CPT | Performed by: RADIOLOGY

## 2022-01-07 PROCEDURE — 88305 TISSUE EXAM BY PATHOLOGIST: CPT | Performed by: INTERNAL MEDICINE

## 2022-01-07 PROCEDURE — 88185 FLOWCYTOMETRY/TC ADD-ON: CPT | Performed by: RADIOLOGY

## 2022-01-07 PROCEDURE — 88342 IMHCHEM/IMCYTCHM 1ST ANTB: CPT | Performed by: INTERNAL MEDICINE

## 2022-01-07 PROCEDURE — 88311 DECALCIFY TISSUE: CPT | Performed by: INTERNAL MEDICINE

## 2022-01-07 PROCEDURE — 88264 CHROMOSOME ANALYSIS 20-25: CPT | Performed by: RADIOLOGY

## 2022-01-07 PROCEDURE — 88291 CYTO/MOLECULAR REPORT: CPT | Performed by: RADIOLOGY

## 2022-01-07 RX ORDER — FLUMAZENIL 0.1 MG/ML
0.2 INJECTION, SOLUTION INTRAVENOUS AS NEEDED
Status: DISCONTINUED | OUTPATIENT
Start: 2022-01-07 | End: 2022-01-09

## 2022-01-07 RX ORDER — NALOXONE HYDROCHLORIDE 0.4 MG/ML
80 INJECTION, SOLUTION INTRAMUSCULAR; INTRAVENOUS; SUBCUTANEOUS AS NEEDED
Status: DISCONTINUED | OUTPATIENT
Start: 2022-01-07 | End: 2022-01-09

## 2022-01-07 RX ORDER — SODIUM CHLORIDE 9 MG/ML
INJECTION, SOLUTION INTRAVENOUS CONTINUOUS
Status: DISCONTINUED | OUTPATIENT
Start: 2022-01-07 | End: 2022-01-09

## 2022-01-07 RX ORDER — HEPARIN SODIUM 1000 [USP'U]/ML
INJECTION, SOLUTION INTRAVENOUS; SUBCUTANEOUS
Status: DISPENSED
Start: 2022-01-07 | End: 2022-01-07

## 2022-01-07 RX ORDER — MIDAZOLAM HYDROCHLORIDE 1 MG/ML
1 INJECTION INTRAMUSCULAR; INTRAVENOUS EVERY 5 MIN PRN
Status: DISCONTINUED | OUTPATIENT
Start: 2022-01-07 | End: 2022-01-09

## 2022-01-07 RX ORDER — MIDAZOLAM HYDROCHLORIDE 1 MG/ML
INJECTION INTRAMUSCULAR; INTRAVENOUS
Status: DISPENSED
Start: 2022-01-07 | End: 2022-01-07

## 2022-01-07 RX ADMIN — MIDAZOLAM HYDROCHLORIDE 2 MG: 1 INJECTION INTRAMUSCULAR; INTRAVENOUS at 11:34:00

## 2022-01-07 NOTE — IMAGING NOTE
POST SEDATION BONE MARROW BIOPSY    1159 SITE DRY AND INTACT, JUICE AND SNACK GIVEN VS MONITORED Q 15 MINUTES SEE FLOW SHEETS , PT WATCHING TV DENIES PAIN.      26 PT DENIES PAIN, PT'S RIDE IS HER NIECE WHO IS COMING AT 1245 TO PICK HER UP    [place]

## 2022-01-11 ENCOUNTER — OFFICE VISIT (OUTPATIENT)
Dept: HEMATOLOGY/ONCOLOGY | Facility: HOSPITAL | Age: 77
End: 2022-01-11
Attending: INTERNAL MEDICINE
Payer: COMMERCIAL

## 2022-01-11 VITALS
WEIGHT: 197.38 LBS | OXYGEN SATURATION: 99 % | SYSTOLIC BLOOD PRESSURE: 146 MMHG | HEIGHT: 63.5 IN | TEMPERATURE: 98 F | DIASTOLIC BLOOD PRESSURE: 86 MMHG | BODY MASS INDEX: 34.54 KG/M2 | RESPIRATION RATE: 18 BRPM | HEART RATE: 95 BPM

## 2022-01-11 DIAGNOSIS — D75.839 THROMBOCYTOSIS: ICD-10-CM

## 2022-01-11 DIAGNOSIS — D45 POLYCYTHEMIA VERA (HCC): Primary | ICD-10-CM

## 2022-01-11 DIAGNOSIS — D45 POLYCYTHEMIA VERA (HCC): ICD-10-CM

## 2022-01-11 LAB
ALBUMIN SERPL-MCNC: 3.2 G/DL (ref 3.4–5)
ALBUMIN/GLOB SERPL: 0.8 {RATIO} (ref 1–2)
ALP LIVER SERPL-CCNC: 108 U/L
ALT SERPL-CCNC: 26 U/L
ANION GAP SERPL CALC-SCNC: 3 MMOL/L (ref 0–18)
AST SERPL-CCNC: 17 U/L (ref 15–37)
BASOPHILS # BLD AUTO: 0.07 X10(3) UL (ref 0–0.2)
BASOPHILS NFR BLD AUTO: 1.1 %
BILIRUB SERPL-MCNC: 0.5 MG/DL (ref 0.1–2)
BUN BLD-MCNC: 10 MG/DL (ref 7–18)
BUN/CREAT SERPL: 13.7 (ref 10–20)
CALCIUM BLD-MCNC: 9.5 MG/DL (ref 8.5–10.1)
CHLORIDE SERPL-SCNC: 105 MMOL/L (ref 98–112)
CO2 SERPL-SCNC: 29 MMOL/L (ref 21–32)
CREAT BLD-MCNC: 0.73 MG/DL
DEPRECATED RDW RBC AUTO: 59.2 FL (ref 35.1–46.3)
EOSINOPHIL # BLD AUTO: 0.17 X10(3) UL (ref 0–0.7)
EOSINOPHIL NFR BLD AUTO: 2.7 %
ERYTHROCYTE [DISTWIDTH] IN BLOOD BY AUTOMATED COUNT: 22.2 % (ref 11–15)
GLOBULIN PLAS-MCNC: 4.1 G/DL (ref 2.8–4.4)
GLUCOSE BLD-MCNC: 224 MG/DL (ref 70–99)
HCT VFR BLD AUTO: 42.4 %
HGB BLD-MCNC: 13.6 G/DL
IMM GRANULOCYTES # BLD AUTO: 0.05 X10(3) UL (ref 0–1)
IMM GRANULOCYTES NFR BLD: 0.8 %
LYMPHOCYTES # BLD AUTO: 1.93 X10(3) UL (ref 1–4)
LYMPHOCYTES NFR BLD AUTO: 30.2 %
MCH RBC QN AUTO: 28.2 PG (ref 26–34)
MCHC RBC AUTO-ENTMCNC: 32.1 G/DL (ref 31–37)
MCV RBC AUTO: 88 FL
MONOCYTES # BLD AUTO: 0.73 X10(3) UL (ref 0.1–1)
MONOCYTES NFR BLD AUTO: 11.4 %
NEUTROPHILS # BLD AUTO: 3.45 X10 (3) UL (ref 1.5–7.7)
NEUTROPHILS # BLD AUTO: 3.45 X10(3) UL (ref 1.5–7.7)
NEUTROPHILS NFR BLD AUTO: 53.8 %
OSMOLALITY SERPL CALC.SUM OF ELEC: 290 MOSM/KG (ref 275–295)
PLATELET # BLD AUTO: 531 10(3)UL (ref 150–450)
PLATELET MORPHOLOGY: NORMAL
POTASSIUM SERPL-SCNC: 4.1 MMOL/L (ref 3.5–5.1)
PROT SERPL-MCNC: 7.3 G/DL (ref 6.4–8.2)
RBC # BLD AUTO: 4.82 X10(6)UL
SODIUM SERPL-SCNC: 137 MMOL/L (ref 136–145)
WBC # BLD AUTO: 6.4 X10(3) UL (ref 4–11)

## 2022-01-11 PROCEDURE — 85025 COMPLETE CBC W/AUTO DIFF WBC: CPT

## 2022-01-11 PROCEDURE — 36415 COLL VENOUS BLD VENIPUNCTURE: CPT

## 2022-01-11 PROCEDURE — 99214 OFFICE O/P EST MOD 30 MIN: CPT | Performed by: INTERNAL MEDICINE

## 2022-01-11 PROCEDURE — 80053 COMPREHEN METABOLIC PANEL: CPT

## 2022-01-11 NOTE — PROGRESS NOTES
Cancer Center Progress Note    Patient Name: Maggie Lenz   YOB: 1945   Medical Record Number: [id number]   Attending Physician: Marigene Dandy, M.D.      Chief Complaint:  MPN  Phlebotomy   hydrea monitoring    Oncology History:  77year old se aspirin 81 MG Oral Chew Tab, Chew 81 mg by mouth daily. , Disp: , Rfl:   •  Vitamin D3, Cholecalciferol, (VITAMIN D3) 125 MCG (5000 UT) Oral Cap, Take 5,000 Units by mouth daily. , Disp: , Rfl:     Allergies:    Lisinopril              TONGUE SWELLING     Re

## 2022-01-12 LAB
CD10 CELLS NFR SPEC: 1 %
CD11C CELLS NFR SPEC: 15 %
CD14 CELLS NFR SPEC: 1 %
CD19 CELLS NFR SPEC: 25 %
CD19/CD10 CELLS: 1 %
CD20 CELLS NFR SPEC: 25 %
CD22 CELLS NFR SPEC: 24 %
CD23 CELLS NFR SPEC: 1 %
CD3 CELLS NFR SPEC: 66 %
CD3+CD4+ CELLS NFR SPEC: 40 %
CD3+CD4+ CELLS/CD3+CD8+ CLL SPEC: 1.5
CD3+CD8+ CELLS NFR SPEC: 26 %
CD45 CELLS NFR SPEC: 100 %
CD5 CELLS NFR SPEC: 67 %
CD5/CD19 CELLS: 2 %
CD56 CELLS NFR SPEC: 7 %
CD7 CELLS NFR SPEC: 63 %
CELL SURF KAPPA/LAMBDA RATIO: 3
CELL SURF LAMBDA LIGHT CHAIN: 6 %
CELL SURFACE KAPPA LIGHT CHAIN: 18 %
FMC7 CELLS NFR SPEC: 18 %

## 2022-02-08 ENCOUNTER — OFFICE VISIT (OUTPATIENT)
Dept: HEMATOLOGY/ONCOLOGY | Facility: HOSPITAL | Age: 77
End: 2022-02-08
Attending: INTERNAL MEDICINE
Payer: COMMERCIAL

## 2022-02-08 VITALS
WEIGHT: 198 LBS | BODY MASS INDEX: 34.65 KG/M2 | DIASTOLIC BLOOD PRESSURE: 75 MMHG | RESPIRATION RATE: 18 BRPM | HEART RATE: 93 BPM | SYSTOLIC BLOOD PRESSURE: 146 MMHG | OXYGEN SATURATION: 98 % | TEMPERATURE: 99 F | HEIGHT: 63.5 IN

## 2022-02-08 DIAGNOSIS — D45 POLYCYTHEMIA VERA (HCC): ICD-10-CM

## 2022-02-08 DIAGNOSIS — D45 POLYCYTHEMIA VERA (HCC): Primary | ICD-10-CM

## 2022-02-08 LAB
BASOPHILS # BLD AUTO: 0.05 X10(3) UL (ref 0–0.2)
BASOPHILS NFR BLD AUTO: 0.8 %
DEPRECATED HBV CORE AB SER IA-ACNC: 110.8 NG/ML
DEPRECATED RDW RBC AUTO: 91.2 FL (ref 35.1–46.3)
EOSINOPHIL # BLD AUTO: 0.19 X10(3) UL (ref 0–0.7)
EOSINOPHIL NFR BLD AUTO: 3.1 %
ERYTHROCYTE [DISTWIDTH] IN BLOOD BY AUTOMATED COUNT: 27.5 % (ref 11–15)
HCT VFR BLD AUTO: 40.7 %
HGB BLD-MCNC: 13.3 G/DL
IMM GRANULOCYTES # BLD AUTO: 0.1 X10(3) UL (ref 0–1)
IMM GRANULOCYTES NFR BLD: 1.6 %
IRON SATN MFR SERPL: 29 %
IRON SERPL-MCNC: 97 UG/DL
LYMPHOCYTES # BLD AUTO: 2.04 X10(3) UL (ref 1–4)
LYMPHOCYTES NFR BLD AUTO: 33.5 %
MCH RBC QN AUTO: 30.8 PG (ref 26–34)
MCHC RBC AUTO-ENTMCNC: 32.7 G/DL (ref 31–37)
MCV RBC AUTO: 94.2 FL
MONOCYTES # BLD AUTO: 0.65 X10(3) UL (ref 0.1–1)
MONOCYTES NFR BLD AUTO: 10.7 %
NEUTROPHILS # BLD AUTO: 3.06 X10 (3) UL (ref 1.5–7.7)
NEUTROPHILS # BLD AUTO: 3.06 X10(3) UL (ref 1.5–7.7)
NEUTROPHILS NFR BLD AUTO: 50.3 %
PLATELET # BLD AUTO: 373 10(3)UL (ref 150–450)
PLATELET MORPHOLOGY: NORMAL
RBC # BLD AUTO: 4.32 X10(6)UL
TIBC SERPL-MCNC: 329 UG/DL (ref 240–450)
TRANSFERRIN SERPL-MCNC: 221 MG/DL (ref 200–360)
VIT B12 SERPL-MCNC: 401 PG/ML (ref 193–986)
WBC # BLD AUTO: 6.1 X10(3) UL (ref 4–11)

## 2022-02-08 PROCEDURE — 82728 ASSAY OF FERRITIN: CPT

## 2022-02-08 PROCEDURE — 84466 ASSAY OF TRANSFERRIN: CPT

## 2022-02-08 PROCEDURE — 83540 ASSAY OF IRON: CPT

## 2022-02-08 PROCEDURE — 82607 VITAMIN B-12: CPT

## 2022-02-08 PROCEDURE — 36415 COLL VENOUS BLD VENIPUNCTURE: CPT

## 2022-02-08 PROCEDURE — 99214 OFFICE O/P EST MOD 30 MIN: CPT | Performed by: INTERNAL MEDICINE

## 2022-02-08 PROCEDURE — 85025 COMPLETE CBC W/AUTO DIFF WBC: CPT

## 2022-02-09 ENCOUNTER — TELEPHONE (OUTPATIENT)
Dept: HEMATOLOGY/ONCOLOGY | Facility: HOSPITAL | Age: 77
End: 2022-02-09

## 2022-02-09 RX ORDER — AMLODIPINE BESYLATE 10 MG/1
10 TABLET ORAL DAILY
Qty: 90 TABLET | Refills: 2 | OUTPATIENT
Start: 2022-02-09

## 2022-02-09 NOTE — TELEPHONE ENCOUNTER
Refill passed per [facility] protocol. Requested Prescriptions   Pending Prescriptions Disp Refills    amLODIPine 10 MG Oral Tab 90 tablet 2     Sig: Take 1 tablet (10 mg total) by mouth daily.         Hypertensive Medications Protocol Passed - 2/8/2022 10:55 PM        Passed - CMP or BMP in past 12 months        Passed - Appointment in past 6 or next 3 months        Passed - GFR  > 50     Lab Results   Component Value Date    GFRAA 93 01/11/2022                        Future Appointments         Provider Department Appt Notes    In 1 month Sallie Rodriguez, [address] [facility] Endocrinology 3 month fu     In 1 month Rene Sousa MD [facility], [address], [place] 3 Month Follow Up    In 2 months EM [address] - Infusion [id number]    In 2 months Mame Gibbs [facility] 19 Hematology Oncology F/U              Recent Outpatient Visits              Yesterday Polycythemia vera [facility])    [facility] Hematology Oncology    Nurse Only    Yesterday Polycythemia vera [facility])    [facility] Hematology Oncology Neptali Rodriguez MD    Office Visit    4 weeks ago Polycythemia vera [facility])    [facility] Hematology Oncology    Nurse Only    4 weeks ago Polycythemia vera [facility])    [facility] Hematology Oncology Neptali Rodriguez MD    Office Visit    1 month ago Polycythemia vera ([facility] [address]    [address]    Nurse Only

## 2022-02-28 RX ORDER — HYDROXYUREA 500 MG/1
CAPSULE ORAL
Qty: 270 CAPSULE | Refills: 0 | Status: SHIPPED | OUTPATIENT
Start: 2022-02-28

## 2022-03-14 ENCOUNTER — LAB ENCOUNTER (OUTPATIENT)
Dept: LAB | Facility: HOSPITAL | Age: 77
End: 2022-03-14
Attending: INTERNAL MEDICINE
Payer: COMMERCIAL

## 2022-03-14 ENCOUNTER — OFFICE VISIT (OUTPATIENT)
Dept: ENDOCRINOLOGY CLINIC | Facility: CLINIC | Age: 77
End: 2022-03-14
Payer: COMMERCIAL

## 2022-03-14 VITALS
DIASTOLIC BLOOD PRESSURE: 78 MMHG | HEIGHT: 63.5 IN | BODY MASS INDEX: 34.96 KG/M2 | HEART RATE: 86 BPM | SYSTOLIC BLOOD PRESSURE: 135 MMHG | WEIGHT: 199.81 LBS

## 2022-03-14 DIAGNOSIS — E78.5 DYSLIPIDEMIA: ICD-10-CM

## 2022-03-14 DIAGNOSIS — E11.69 TYPE 2 DIABETES MELLITUS WITH OTHER SPECIFIED COMPLICATION, UNSPECIFIED WHETHER LONG TERM INSULIN USE (HCC): ICD-10-CM

## 2022-03-14 DIAGNOSIS — E11.69 TYPE 2 DIABETES MELLITUS WITH OTHER SPECIFIED COMPLICATION, UNSPECIFIED WHETHER LONG TERM INSULIN USE (HCC): Primary | ICD-10-CM

## 2022-03-14 LAB
CARTRIDGE LOT#: ABNORMAL NUMERIC
CHOLEST SERPL-MCNC: 188 MG/DL (ref ?–200)
CREAT UR-SCNC: 120 MG/DL
FASTING PATIENT LIPID ANSWER: YES
GLUCOSE BLOOD: 104
HDLC SERPL-MCNC: 63 MG/DL (ref 40–59)
HEMOGLOBIN A1C: 8.2 % (ref 4.3–5.6)
LDLC SERPL CALC-MCNC: 103 MG/DL (ref ?–100)
MICROALBUMIN UR-MCNC: 2.57 MG/DL
MICROALBUMIN/CREAT 24H UR-RTO: 21.4 UG/MG (ref ?–30)
NONHDLC SERPL-MCNC: 125 MG/DL (ref ?–130)
TEST STRIP LOT #: NORMAL NUMERIC
TRIGL SERPL-MCNC: 126 MG/DL (ref 30–149)
VLDLC SERPL CALC-MCNC: 21 MG/DL (ref 0–30)

## 2022-03-14 PROCEDURE — 82947 ASSAY GLUCOSE BLOOD QUANT: CPT | Performed by: INTERNAL MEDICINE

## 2022-03-14 PROCEDURE — 80061 LIPID PANEL: CPT

## 2022-03-14 PROCEDURE — 82043 UR ALBUMIN QUANTITATIVE: CPT

## 2022-03-14 PROCEDURE — 3052F HG A1C>EQUAL 8.0%<EQUAL 9.0%: CPT | Performed by: INTERNAL MEDICINE

## 2022-03-14 PROCEDURE — 36416 COLLJ CAPILLARY BLOOD SPEC: CPT | Performed by: INTERNAL MEDICINE

## 2022-03-14 PROCEDURE — 3008F BODY MASS INDEX DOCD: CPT | Performed by: INTERNAL MEDICINE

## 2022-03-14 PROCEDURE — 36415 COLL VENOUS BLD VENIPUNCTURE: CPT

## 2022-03-14 PROCEDURE — 3075F SYST BP GE 130 - 139MM HG: CPT | Performed by: INTERNAL MEDICINE

## 2022-03-14 PROCEDURE — 3078F DIAST BP <80 MM HG: CPT | Performed by: INTERNAL MEDICINE

## 2022-03-14 PROCEDURE — 3061F NEG MICROALBUMINURIA REV: CPT | Performed by: INTERNAL MEDICINE

## 2022-03-14 PROCEDURE — 82570 ASSAY OF URINE CREATININE: CPT

## 2022-03-14 PROCEDURE — 99214 OFFICE O/P EST MOD 30 MIN: CPT | Performed by: INTERNAL MEDICINE

## 2022-03-14 PROCEDURE — 83036 HEMOGLOBIN GLYCOSYLATED A1C: CPT | Performed by: INTERNAL MEDICINE

## 2022-03-14 RX ORDER — ROSUVASTATIN CALCIUM 5 MG/1
5 TABLET, COATED ORAL NIGHTLY
COMMUNITY

## 2022-03-23 ENCOUNTER — TELEPHONE (OUTPATIENT)
Dept: ENDOCRINOLOGY CLINIC | Facility: CLINIC | Age: 77
End: 2022-03-23

## 2022-03-23 NOTE — TELEPHONE ENCOUNTER
Chart reviewed and per LOV note patient should be on:     - glimepiride 4 mg daily with breakfast  - metformin 1000 mg BID  - linagliptin 5 mg daily    Attempted to call the patient, no answer. Unable to leave a message as mailbox is full. RN sent SmartCare system.

## 2022-03-23 NOTE — TELEPHONE ENCOUNTER
Pt needs to get clarification on directions for Tradjenta med. Pt is not sure if she should be taking 2 or 3 meds?

## 2022-04-05 ENCOUNTER — OFFICE VISIT (OUTPATIENT)
Dept: INTERNAL MEDICINE CLINIC | Facility: CLINIC | Age: 77
End: 2022-04-05
Payer: COMMERCIAL

## 2022-04-05 VITALS
HEIGHT: 63.5 IN | RESPIRATION RATE: 14 BRPM | BODY MASS INDEX: 34.82 KG/M2 | HEART RATE: 96 BPM | WEIGHT: 199 LBS | SYSTOLIC BLOOD PRESSURE: 120 MMHG | OXYGEN SATURATION: 97 % | DIASTOLIC BLOOD PRESSURE: 70 MMHG

## 2022-04-05 DIAGNOSIS — D45 POLYCYTHEMIA VERA (HCC): ICD-10-CM

## 2022-04-05 DIAGNOSIS — E11.9 TYPE 2 DIABETES MELLITUS WITHOUT COMPLICATION, WITHOUT LONG-TERM CURRENT USE OF INSULIN (HCC): Primary | ICD-10-CM

## 2022-04-05 DIAGNOSIS — I10 ESSENTIAL HYPERTENSION: ICD-10-CM

## 2022-04-05 PROCEDURE — 99214 OFFICE O/P EST MOD 30 MIN: CPT | Performed by: INTERNAL MEDICINE

## 2022-04-05 PROCEDURE — 3008F BODY MASS INDEX DOCD: CPT | Performed by: INTERNAL MEDICINE

## 2022-04-05 PROCEDURE — 3074F SYST BP LT 130 MM HG: CPT | Performed by: INTERNAL MEDICINE

## 2022-04-05 PROCEDURE — 3078F DIAST BP <80 MM HG: CPT | Performed by: INTERNAL MEDICINE

## 2022-04-11 ENCOUNTER — APPOINTMENT (OUTPATIENT)
Dept: HEMATOLOGY/ONCOLOGY | Facility: HOSPITAL | Age: 77
End: 2022-04-11
Attending: INTERNAL MEDICINE
Payer: COMMERCIAL

## 2022-04-20 ENCOUNTER — NURSE ONLY (OUTPATIENT)
Dept: HEMATOLOGY/ONCOLOGY | Facility: HOSPITAL | Age: 77
End: 2022-04-20
Attending: INTERNAL MEDICINE
Payer: COMMERCIAL

## 2022-04-20 VITALS
TEMPERATURE: 99 F | SYSTOLIC BLOOD PRESSURE: 145 MMHG | OXYGEN SATURATION: 95 % | WEIGHT: 201 LBS | BODY MASS INDEX: 35.17 KG/M2 | HEART RATE: 94 BPM | RESPIRATION RATE: 18 BRPM | DIASTOLIC BLOOD PRESSURE: 72 MMHG | HEIGHT: 63.5 IN

## 2022-04-20 DIAGNOSIS — D45 POLYCYTHEMIA VERA (HCC): ICD-10-CM

## 2022-04-20 DIAGNOSIS — D75.839 THROMBOCYTOSIS: ICD-10-CM

## 2022-04-20 DIAGNOSIS — D75.1 POLYCYTHEMIA: ICD-10-CM

## 2022-04-20 DIAGNOSIS — D45 POLYCYTHEMIA VERA (HCC): Primary | ICD-10-CM

## 2022-04-20 LAB
ALBUMIN SERPL-MCNC: 3.5 G/DL (ref 3.4–5)
ALBUMIN/GLOB SERPL: 0.8 {RATIO} (ref 1–2)
ALP LIVER SERPL-CCNC: 92 U/L
ALT SERPL-CCNC: 21 U/L
ANION GAP SERPL CALC-SCNC: 3 MMOL/L (ref 0–18)
AST SERPL-CCNC: 20 U/L (ref 15–37)
BASOPHILS # BLD AUTO: 0.04 X10(3) UL (ref 0–0.2)
BASOPHILS NFR BLD AUTO: 0.6 %
BILIRUB SERPL-MCNC: 0.5 MG/DL (ref 0.1–2)
BUN BLD-MCNC: 16 MG/DL (ref 7–18)
BUN/CREAT SERPL: 18.6 (ref 10–20)
CALCIUM BLD-MCNC: 9.6 MG/DL (ref 8.5–10.1)
CHLORIDE SERPL-SCNC: 106 MMOL/L (ref 98–112)
CO2 SERPL-SCNC: 29 MMOL/L (ref 21–32)
CREAT BLD-MCNC: 0.86 MG/DL
DEPRECATED RDW RBC AUTO: 86.6 FL (ref 35.1–46.3)
EOSINOPHIL # BLD AUTO: 0.11 X10(3) UL (ref 0–0.7)
EOSINOPHIL NFR BLD AUTO: 1.5 %
ERYTHROCYTE [DISTWIDTH] IN BLOOD BY AUTOMATED COUNT: 19.5 % (ref 11–15)
GLOBULIN PLAS-MCNC: 4.3 G/DL (ref 2.8–4.4)
GLUCOSE BLD-MCNC: 78 MG/DL (ref 70–99)
HCT VFR BLD AUTO: 37.5 %
HGB BLD-MCNC: 12.9 G/DL
IMM GRANULOCYTES # BLD AUTO: 0.11 X10(3) UL (ref 0–1)
IMM GRANULOCYTES NFR BLD: 1.5 %
IRON SATN MFR SERPL: 26 %
IRON SERPL-MCNC: 90 UG/DL
LYMPHOCYTES # BLD AUTO: 2.62 X10(3) UL (ref 1–4)
LYMPHOCYTES NFR BLD AUTO: 36.6 %
MCH RBC QN AUTO: 39.8 PG (ref 26–34)
MCHC RBC AUTO-ENTMCNC: 34.4 G/DL (ref 31–37)
MCV RBC AUTO: 115.7 FL
MONOCYTES # BLD AUTO: 0.75 X10(3) UL (ref 0.1–1)
MONOCYTES NFR BLD AUTO: 10.5 %
NEUTROPHILS # BLD AUTO: 3.52 X10 (3) UL (ref 1.5–7.7)
NEUTROPHILS # BLD AUTO: 3.52 X10(3) UL (ref 1.5–7.7)
NEUTROPHILS NFR BLD AUTO: 49.3 %
OSMOLALITY SERPL CALC.SUM OF ELEC: 286 MOSM/KG (ref 275–295)
PLATELET # BLD AUTO: 354 10(3)UL (ref 150–450)
PLATELET MORPHOLOGY: NORMAL
POTASSIUM SERPL-SCNC: 4 MMOL/L (ref 3.5–5.1)
PROT SERPL-MCNC: 7.8 G/DL (ref 6.4–8.2)
RBC # BLD AUTO: 3.24 X10(6)UL
SODIUM SERPL-SCNC: 138 MMOL/L (ref 136–145)
TIBC SERPL-MCNC: 344 UG/DL (ref 240–450)
TRANSFERRIN SERPL-MCNC: 231 MG/DL (ref 200–360)
VIT B12 SERPL-MCNC: 300 PG/ML (ref 193–986)
WBC # BLD AUTO: 7.2 X10(3) UL (ref 4–11)

## 2022-04-20 PROCEDURE — 85025 COMPLETE CBC W/AUTO DIFF WBC: CPT

## 2022-04-20 PROCEDURE — 82607 VITAMIN B-12: CPT

## 2022-04-20 PROCEDURE — 80053 COMPREHEN METABOLIC PANEL: CPT

## 2022-04-20 PROCEDURE — 99214 OFFICE O/P EST MOD 30 MIN: CPT | Performed by: INTERNAL MEDICINE

## 2022-04-20 PROCEDURE — 36415 COLL VENOUS BLD VENIPUNCTURE: CPT

## 2022-04-20 PROCEDURE — 84466 ASSAY OF TRANSFERRIN: CPT

## 2022-04-20 PROCEDURE — 83540 ASSAY OF IRON: CPT

## 2022-04-20 PROCEDURE — 85060 BLOOD SMEAR INTERPRETATION: CPT

## 2022-05-07 NOTE — PROGRESS NOTES
Román Clark is a 77year old female. Patient presents with:  Physical    HPI:   60-year-old pleasant lady with medical history significant for diabetes, hypertension, polycythemia here for physical.  She reported she is doing well.   Her diabetes number g Onset   • Hypertension Mother    • Hypertension Father    • Breast Cancer Sister         Lisinopril              TONGUE SWELLING     REVIEW OF SYSTEMS:   Review of Systems   Review of Systems   Constitutional: Negative for activity change, appetite change normal.         Behavior: Behavior normal.       ASSESSMENT AND PLAN:   1. Adult general medical exam  Encourage patient to eat healthy with fruits and vegetables. Avoid too much of sweets and carbohydrates.   I encourage patient exercise for 30 to 40 tyerse yes

## 2022-05-26 RX ORDER — HYDROXYUREA 500 MG/1
CAPSULE ORAL
Qty: 270 CAPSULE | Refills: 0 | Status: SHIPPED | OUTPATIENT
Start: 2022-05-26

## 2022-06-17 ENCOUNTER — OFFICE VISIT (OUTPATIENT)
Dept: ENDOCRINOLOGY CLINIC | Facility: CLINIC | Age: 77
End: 2022-06-17
Payer: COMMERCIAL

## 2022-06-17 VITALS
DIASTOLIC BLOOD PRESSURE: 76 MMHG | WEIGHT: 199 LBS | SYSTOLIC BLOOD PRESSURE: 131 MMHG | BODY MASS INDEX: 35 KG/M2 | HEART RATE: 92 BPM

## 2022-06-17 DIAGNOSIS — E78.5 DYSLIPIDEMIA: ICD-10-CM

## 2022-06-17 DIAGNOSIS — E11.69 TYPE 2 DIABETES MELLITUS WITH OTHER SPECIFIED COMPLICATION, UNSPECIFIED WHETHER LONG TERM INSULIN USE (HCC): Primary | ICD-10-CM

## 2022-06-17 LAB
CARTRIDGE LOT#: ABNORMAL NUMERIC
GLUCOSE BLOOD: 100
HEMOGLOBIN A1C: 6.3 % (ref 4.3–5.6)
TEST STRIP LOT #: NORMAL NUMERIC

## 2022-06-17 PROCEDURE — 3078F DIAST BP <80 MM HG: CPT | Performed by: INTERNAL MEDICINE

## 2022-06-17 PROCEDURE — 3075F SYST BP GE 130 - 139MM HG: CPT | Performed by: INTERNAL MEDICINE

## 2022-06-17 PROCEDURE — 83036 HEMOGLOBIN GLYCOSYLATED A1C: CPT | Performed by: INTERNAL MEDICINE

## 2022-06-17 PROCEDURE — 99213 OFFICE O/P EST LOW 20 MIN: CPT | Performed by: INTERNAL MEDICINE

## 2022-06-17 PROCEDURE — 3044F HG A1C LEVEL LT 7.0%: CPT | Performed by: INTERNAL MEDICINE

## 2022-06-17 PROCEDURE — 82947 ASSAY GLUCOSE BLOOD QUANT: CPT | Performed by: INTERNAL MEDICINE

## 2022-06-19 RX ORDER — LINAGLIPTIN 5 MG/1
TABLET, FILM COATED ORAL
Qty: 30 TABLET | Refills: 2 | Status: SHIPPED | OUTPATIENT
Start: 2022-06-19

## 2022-06-30 RX ORDER — ROSUVASTATIN CALCIUM 5 MG/1
TABLET, COATED ORAL
Qty: 90 TABLET | Refills: 1 | Status: SHIPPED | OUTPATIENT
Start: 2022-06-30

## 2022-07-12 ENCOUNTER — OFFICE VISIT (OUTPATIENT)
Dept: INTERNAL MEDICINE CLINIC | Facility: CLINIC | Age: 77
End: 2022-07-12
Payer: COMMERCIAL

## 2022-07-12 VITALS
DIASTOLIC BLOOD PRESSURE: 70 MMHG | BODY MASS INDEX: 34.82 KG/M2 | WEIGHT: 199 LBS | HEIGHT: 63.5 IN | SYSTOLIC BLOOD PRESSURE: 110 MMHG | HEART RATE: 80 BPM | RESPIRATION RATE: 14 BRPM | OXYGEN SATURATION: 99 %

## 2022-07-12 DIAGNOSIS — I10 ESSENTIAL HYPERTENSION: ICD-10-CM

## 2022-07-12 DIAGNOSIS — E11.9 TYPE 2 DIABETES MELLITUS WITHOUT COMPLICATION, WITHOUT LONG-TERM CURRENT USE OF INSULIN (HCC): Primary | ICD-10-CM

## 2022-07-12 DIAGNOSIS — E78.5 DYSLIPIDEMIA: ICD-10-CM

## 2022-07-12 PROCEDURE — 99214 OFFICE O/P EST MOD 30 MIN: CPT | Performed by: INTERNAL MEDICINE

## 2022-07-12 PROCEDURE — 3074F SYST BP LT 130 MM HG: CPT | Performed by: INTERNAL MEDICINE

## 2022-07-12 PROCEDURE — 3078F DIAST BP <80 MM HG: CPT | Performed by: INTERNAL MEDICINE

## 2022-07-12 PROCEDURE — 3008F BODY MASS INDEX DOCD: CPT | Performed by: INTERNAL MEDICINE

## 2022-07-12 RX ORDER — GLIMEPIRIDE 2 MG/1
2 TABLET ORAL
Qty: 90 TABLET | Refills: 1 | Status: SHIPPED | OUTPATIENT
Start: 2022-07-12 | End: 2022-10-10

## 2022-07-18 ENCOUNTER — NURSE ONLY (OUTPATIENT)
Dept: HEMATOLOGY/ONCOLOGY | Facility: HOSPITAL | Age: 77
End: 2022-07-18
Attending: INTERNAL MEDICINE
Payer: COMMERCIAL

## 2022-07-18 VITALS
RESPIRATION RATE: 16 BRPM | SYSTOLIC BLOOD PRESSURE: 134 MMHG | DIASTOLIC BLOOD PRESSURE: 71 MMHG | HEIGHT: 63.5 IN | HEART RATE: 88 BPM | TEMPERATURE: 99 F | OXYGEN SATURATION: 97 % | WEIGHT: 199 LBS | BODY MASS INDEX: 34.82 KG/M2

## 2022-07-18 DIAGNOSIS — D45 POLYCYTHEMIA VERA (HCC): ICD-10-CM

## 2022-07-18 DIAGNOSIS — D45 POLYCYTHEMIA VERA (HCC): Primary | ICD-10-CM

## 2022-07-18 LAB
ALBUMIN SERPL-MCNC: 3.2 G/DL (ref 3.4–5)
ALBUMIN/GLOB SERPL: 0.8 {RATIO} (ref 1–2)
ALP LIVER SERPL-CCNC: 91 U/L
ALT SERPL-CCNC: 23 U/L
ANION GAP SERPL CALC-SCNC: 8 MMOL/L (ref 0–18)
AST SERPL-CCNC: 23 U/L (ref 15–37)
BASOPHILS # BLD AUTO: 0.04 X10(3) UL (ref 0–0.2)
BASOPHILS NFR BLD AUTO: 0.6 %
BILIRUB SERPL-MCNC: 0.6 MG/DL (ref 0.1–2)
BUN BLD-MCNC: 16 MG/DL (ref 7–18)
BUN/CREAT SERPL: 21.1 (ref 10–20)
CALCIUM BLD-MCNC: 9.2 MG/DL (ref 8.5–10.1)
CHLORIDE SERPL-SCNC: 110 MMOL/L (ref 98–112)
CO2 SERPL-SCNC: 23 MMOL/L (ref 21–32)
CREAT BLD-MCNC: 0.76 MG/DL
DEPRECATED RDW RBC AUTO: 55.3 FL (ref 35.1–46.3)
EOSINOPHIL # BLD AUTO: 0.14 X10(3) UL (ref 0–0.7)
EOSINOPHIL NFR BLD AUTO: 2.2 %
ERYTHROCYTE [DISTWIDTH] IN BLOOD BY AUTOMATED COUNT: 12.4 % (ref 11–15)
GLOBULIN PLAS-MCNC: 4.1 G/DL (ref 2.8–4.4)
GLUCOSE BLD-MCNC: 135 MG/DL (ref 70–99)
HCT VFR BLD AUTO: 37.5 %
HGB BLD-MCNC: 12.9 G/DL
IMM GRANULOCYTES # BLD AUTO: 0.07 X10(3) UL (ref 0–1)
IMM GRANULOCYTES NFR BLD: 1.1 %
LYMPHOCYTES # BLD AUTO: 1.98 X10(3) UL (ref 1–4)
LYMPHOCYTES NFR BLD AUTO: 31 %
MCH RBC QN AUTO: 41.7 PG (ref 26–34)
MCHC RBC AUTO-ENTMCNC: 34.4 G/DL (ref 31–37)
MCV RBC AUTO: 121.4 FL
MONOCYTES # BLD AUTO: 0.69 X10(3) UL (ref 0.1–1)
MONOCYTES NFR BLD AUTO: 10.8 %
NEUTROPHILS # BLD AUTO: 3.46 X10 (3) UL (ref 1.5–7.7)
NEUTROPHILS # BLD AUTO: 3.46 X10(3) UL (ref 1.5–7.7)
NEUTROPHILS NFR BLD AUTO: 54.3 %
OSMOLALITY SERPL CALC.SUM OF ELEC: 295 MOSM/KG (ref 275–295)
PLATELET # BLD AUTO: 327 10(3)UL (ref 150–450)
POTASSIUM SERPL-SCNC: 3.9 MMOL/L (ref 3.5–5.1)
PROT SERPL-MCNC: 7.3 G/DL (ref 6.4–8.2)
RBC # BLD AUTO: 3.09 X10(6)UL
SODIUM SERPL-SCNC: 141 MMOL/L (ref 136–145)
VIT B12 SERPL-MCNC: 324 PG/ML (ref 193–986)
WBC # BLD AUTO: 6.4 X10(3) UL (ref 4–11)

## 2022-07-18 PROCEDURE — 85025 COMPLETE CBC W/AUTO DIFF WBC: CPT

## 2022-07-18 PROCEDURE — 36415 COLL VENOUS BLD VENIPUNCTURE: CPT

## 2022-07-18 PROCEDURE — 99214 OFFICE O/P EST MOD 30 MIN: CPT | Performed by: INTERNAL MEDICINE

## 2022-07-18 PROCEDURE — 82607 VITAMIN B-12: CPT

## 2022-07-18 PROCEDURE — 80053 COMPREHEN METABOLIC PANEL: CPT

## 2022-08-09 RX ORDER — AMLODIPINE BESYLATE 10 MG/1
TABLET ORAL
Qty: 90 TABLET | Refills: 2 | Status: SHIPPED | OUTPATIENT
Start: 2022-08-09

## 2022-08-22 RX ORDER — HYDROXYUREA 500 MG/1
CAPSULE ORAL
Qty: 270 CAPSULE | Refills: 0 | Status: SHIPPED | OUTPATIENT
Start: 2022-08-22

## 2022-09-28 ENCOUNTER — TELEPHONE (OUTPATIENT)
Dept: INTERNAL MEDICINE CLINIC | Facility: CLINIC | Age: 77
End: 2022-09-28

## 2022-09-28 DIAGNOSIS — Z01.00 DIABETIC EYE EXAM (HCC): Primary | ICD-10-CM

## 2022-09-28 DIAGNOSIS — E11.9 DIABETIC EYE EXAM (HCC): Primary | ICD-10-CM

## 2022-09-28 NOTE — TELEPHONE ENCOUNTER
Patient has new insurance and needs a referral to the eye doctor- Dr Apolonia Miller for her diabetic eye exam. Patient is currently at appointment. Warm transferred patient to  to update insurance information- has HMO now. Referral pended.

## 2022-09-28 NOTE — TELEPHONE ENCOUNTER
Dr. Gideon Christopher,    Patient requesting referral to Dr. Jake Maurer for diabetic eye exam.    Pended referral please review diagnosis and sign off if you agree. Thank you.   Ender Rice

## 2022-09-29 RX ORDER — LINAGLIPTIN 5 MG/1
5 TABLET, FILM COATED ORAL DAILY
Qty: 30 TABLET | Refills: 2 | Status: SHIPPED | OUTPATIENT
Start: 2022-09-29

## 2022-10-11 NOTE — TELEPHONE ENCOUNTER
Refill passed per [address] [place] [address] Yulissa Barnes protocol. Requested Prescriptions   Pending Prescriptions Disp Refills    metFORMIN HCl 1000 MG Oral Tab 180 tablet 2     Sig: Take 1 tablet (1,000 mg total) by mouth 2 (two) times daily.         Diabetes Medication Protocol Passed - 10/10/2022 10:40 PM        Passed - Last A1C < 7.5 and within past 6 months     Lab Results   Component Value Date    A1C 6.3 (A) 06/17/2022               Passed - In person appointment or virtual visit in the past 6 mos or appointment in next 3 mos       Recent Outpatient Visits              [facility]    [facility] Hematology Oncology Omid Belcher MD    Office Visit    2 months ago [facility])    [facility] Hematology Oncology    Nurse Only    3 months ago Type 2 diabetes mellitus without complication, without long-term current use of insulin [facility])    [address] Yul[address] Barnes, [address] [facility],[address], Navarro Lopez MD    Office Visit    3 months ago Type 2 diabetes mellitus with other specified complication, unspecified whether long term insulin use [facility])    [address] [place] [address] Yulissa Barnes Endocrinology Barakat Mercy, MD    Office Visit    5 months ago [facility])    [facility] Hematology Oncology Omid Belcher MD    Office Visit     Future Appointments         Provider Department Appt Notes    In 1 week Barakat Mercy, [address] [facility] Endocrinology 4 mon    In 1 month Bhavya Figueroa MD [address] Y[address] Barnes, [address] [facility] 4 mo f/u    In 1 month [address] Hematology Oncology [id number]    In 2 month Mame Avalos 19 Hematology Oncology F/U                Passed [facility] or GFRAA > 50     GFR Evaluation  GFRAA: 88 , resulted on 7/18/2022            Passed - GFR in the past 12 months            Recent Outpatient Visits              2 months ago [facility])    [facility] Hematology Oncology Omid Belcher MD    Office Visit    2 months ago Polycythemia vera [facility])    [facility] Hematology Oncology    Nurse Only    3 months ago Type 2 diabetes mellitus without complication, without long-term current use of insulin [facility])    [facility], [facility], [address], [facility] MD Ruddy    Office Visit    3 months ago Type 2 diabetes mellitus with other specified complication, unspecified whether long term insulin use [facility])    [facility] Endocrinology Barakat Mercy, MD    Office Visit    5 months ago Polycythemia vera [facility])    [facility] Hematology Oncology Omid Belcher MD    Office Visit          Future Appointments         Provider Department Appt Notes    In 1 week Barakat Mercy, [address] [facility] Endocrinology 4 mon    In 1 month Bhavya Figueroa MD [facility]"[employer] [facility], [address] [facility] 4 mo f/u    In 1 month [address] 25 Hematology Oncology [id number]    In 2 month Mame Avalos 19 Hematology Oncology F/U

## 2022-10-18 ENCOUNTER — OFFICE VISIT (OUTPATIENT)
Dept: ENDOCRINOLOGY CLINIC | Facility: CLINIC | Age: 77
End: 2022-10-18
Payer: MEDICARE

## 2022-10-18 VITALS
HEART RATE: 84 BPM | WEIGHT: 201 LBS | DIASTOLIC BLOOD PRESSURE: 78 MMHG | SYSTOLIC BLOOD PRESSURE: 132 MMHG | BODY MASS INDEX: 35 KG/M2

## 2022-10-18 DIAGNOSIS — E78.5 DYSLIPIDEMIA: ICD-10-CM

## 2022-10-18 DIAGNOSIS — E11.69 TYPE 2 DIABETES MELLITUS WITH OTHER SPECIFIED COMPLICATION, UNSPECIFIED WHETHER LONG TERM INSULIN USE (HCC): Primary | ICD-10-CM

## 2022-10-18 LAB
CARTRIDGE LOT#: ABNORMAL NUMERIC
GLUCOSE BLOOD: 105
HEMOGLOBIN A1C: 6.5 % (ref 4.3–5.6)
TEST STRIP LOT #: NORMAL NUMERIC

## 2022-10-18 PROCEDURE — 82947 ASSAY GLUCOSE BLOOD QUANT: CPT | Performed by: INTERNAL MEDICINE

## 2022-10-18 PROCEDURE — 3078F DIAST BP <80 MM HG: CPT | Performed by: INTERNAL MEDICINE

## 2022-10-18 PROCEDURE — 3075F SYST BP GE 130 - 139MM HG: CPT | Performed by: INTERNAL MEDICINE

## 2022-10-18 PROCEDURE — 83036 HEMOGLOBIN GLYCOSYLATED A1C: CPT | Performed by: INTERNAL MEDICINE

## 2022-10-18 PROCEDURE — 99213 OFFICE O/P EST LOW 20 MIN: CPT | Performed by: INTERNAL MEDICINE

## 2022-10-18 RX ORDER — GLIMEPIRIDE 1 MG/1
1 TABLET ORAL
Qty: 90 TABLET | Refills: 0 | Status: SHIPPED | OUTPATIENT
Start: 2022-10-18

## 2022-10-18 RX ORDER — GLIMEPIRIDE 2 MG/1
TABLET ORAL
COMMUNITY
Start: 2022-10-14 | End: 2022-10-18

## 2022-11-09 RX ORDER — AMLODIPINE BESYLATE 10 MG/1
10 TABLET ORAL DAILY
Qty: 90 TABLET | Refills: 1 | Status: SHIPPED | OUTPATIENT
Start: 2022-11-09

## 2022-11-09 NOTE — TELEPHONE ENCOUNTER
Refill passed per [address] protocol. Requested Prescriptions   Pending Prescriptions Disp Refills    amLODIPine 10 MG Oral Tab 90 tablet 2     Sig: Take 1 tablet (10 mg total) by mouth daily.        Hypertensive Medications Protocol Passed - 11/9/2022  1:03 PM        Passed - In person appointment in the past 12 or next 3 months     Recent Outpatient Visits              3 weeks ago Type 2 diabetes mellitus with other specified complication, unspecified whether long term insulin use [facility])    [address] Endocrinology Chio Sheriff MD    Office Visit    3 months [facility]    [facility] Hematology Oncology Jasmine Qiu MD    Office Visit    3 months ago Polycythemia [facility])    [facility] Hematology Oncology    Nurse Only    4 months ago Type 2 diabetes mellitus without complication, without long-term current use of insulin [facility])    [address], [address] [facility] [address], Neal Elliott MD    Office Visit    4 months ago Type 2 diabetes mellitus with other specified complication, unspecified whether long term insulin use [facility])    [address] Endocrinology Chio Sheriff MD    Office Visit          Future Appointments         Provider Department Appt Notes    In 6 days Siria Lopes MD [address], [address] [facility] the [facility] [place] 4 mo f/u    In 1 week [address] 25 Hematology Oncology [id number]    In 1 week Mame Carballo 19 Hematology Oncology F/U     In 4 months Chio Sheriff MD [address], [place] [address] [facility] 5 month fu                Passed - Last BP reading less than 140/90     BP Readings from Last 1 Encounters:  10/18/22 : 132/78              Passed - CMP or BMP in past 6 months     Recent Results (from the past 4392 hour(s))   COMP METABOLIC PANEL (14)    Collection Time: 07/18/22  8:59 AM   Result Value Ref Range    Glucose 135 (H) 70 - 99 mg/dL    Sodium 141 136 - 145 mmol/L    Potassium 3.9 3.5 - 5.1 mmol/L    Chloride 110 98 - 112 mmol/L    CO2 23.0 21.0 - 32.0 mmol/L    Anion Gap 8 0 - 18 mmol/L    BUN 16 7 - 18 mg/dL    Creatinine 0.76 0.55 - 1.02 mg/dL    BUN/CREA Ratio 21.1 (H) 10.0 - 20.0    Calcium, Total 9.2 8.5 - 10.1 mg/dL    Calculated Osmolality 295 275 - 295 mOsm/kg    GFR, Non- 76 >=60    GFR, -American 88 >=60    ALT 23 13 - 56 U/L    AST 23 15 - 37 U/L    Alkaline Phosphatase 91 55 - 142 U/L    Bilirubin, Total 0.6 0.1 - 2.0 mg/dL    Total Protein 7.3 6.4 - 8.2 g/dL    Albumin 3.2 (L) 3.4 - 5.0 g/dL    Globulin  4.1 2.8 - 4.4 g/dL    A/G Ratio 0.8 (L) 1.0 - 2.0    Patient Fasting for CMP? Patient not present      *Note: Due to a large number of results and/or encounters for the requested time period, some results have not been displayed. A complete set of results can be found in Results Review.                Passed - In person appointment or virtual visit in the past 6 months     Recent Outpatient Visits              3 weeks ago Type 2 diabetes mellitus with other specified complication, unspecified whether long term insulin use [facility])    [facility] Endocrinology Rachel Mcclain MD    Office Visit    3 months ago Polycythemia vera [facility] Hematology Oncology Vivek Vasquez MD    Office Visit    3 months ago Polycythemia vera [facility])    [facility] Hematology Oncology    Nurse Only    4 months ago Type 2 diabetes mellitus without complication, without long-term current use of insulin [facility])    [place] [facility], [address], Garcia James MD    Office Visit    4 months ago Type 2 diabetes mellitus with other specified complication, unspecified whether long term insulin use [facility])    [facility] Endocrinology Rachel Mcclain MD    Office Visit          Future Appointments         Provider Department Appt Notes    In 6 days Yon Infante MD [place] [facility], [address] 4 mo f/u    In 1 week [address] Lalo Bejarano [facility] Hematology Oncology [id number]    In 1 week Mame Sanon 19 Hematology Oncology F/U     In 4 months Irving Wall MD [address], [address] [facility] 5 month fu                Passed - EGFRCR or GFRAA > 50     GFR Evaluation  GFRAA: 88 , resulted on 7/18/2022             Recent Outpatient Visits              3 weeks ago Type 2 diabetes mellitus with other specified complication, unspecified whether long term insulin use [facility])    [address] Endocrinology Irving Wall MD    Office Visit    3 months ago [facility])    [facility] Hematology Oncology Alissa Ogden MD    Office Visit    3 months ago [facility])    [facility] Hematology Oncology    Nurse Only    4 months ago Type 2 diabetes mellitus without complication, without long-term current use of insulin [facility])    [address] [place], [address], Kristin Bermudez MD    Office Visit    4 months ago Type 2 diabetes mellitus with other specified complication, unspecified whether long term insulin use [facility])    [address] Endocrinology Irving Wall MD    Office Visit          Future Appointments         Provider Department Appt Notes    In 6 days Priscila Saldana MD [address], [address] [facility] 4 mo f/u    In 1 week [address] Hematology Oncology [id number]    In 1 week Mame Sanon 19 Hematology Oncology F/U     In 4 months Irving Wall MD [address], [facility] 5 month fu

## 2022-11-15 ENCOUNTER — OFFICE VISIT (OUTPATIENT)
Facility: CLINIC | Age: 77
End: 2022-11-15
Payer: MEDICARE

## 2022-11-15 VITALS
WEIGHT: 201 LBS | HEART RATE: 92 BPM | RESPIRATION RATE: 14 BRPM | SYSTOLIC BLOOD PRESSURE: 120 MMHG | OXYGEN SATURATION: 98 % | BODY MASS INDEX: 35.17 KG/M2 | DIASTOLIC BLOOD PRESSURE: 72 MMHG | HEIGHT: 63.5 IN

## 2022-11-15 DIAGNOSIS — E78.5 DYSLIPIDEMIA: ICD-10-CM

## 2022-11-15 DIAGNOSIS — E11.9 TYPE 2 DIABETES MELLITUS WITHOUT COMPLICATION, WITHOUT LONG-TERM CURRENT USE OF INSULIN (HCC): Primary | ICD-10-CM

## 2022-11-15 DIAGNOSIS — Z12.11 SCREEN FOR COLON CANCER: ICD-10-CM

## 2022-11-15 DIAGNOSIS — I10 ESSENTIAL HYPERTENSION: ICD-10-CM

## 2022-11-15 DIAGNOSIS — D45 POLYCYTHEMIA VERA (HCC): ICD-10-CM

## 2022-11-15 DIAGNOSIS — E66.01 CLASS 2 SEVERE OBESITY WITH SERIOUS COMORBIDITY AND BODY MASS INDEX (BMI) OF 35.0 TO 35.9 IN ADULT, UNSPECIFIED OBESITY TYPE (HCC): ICD-10-CM

## 2022-11-15 DIAGNOSIS — Z12.31 BREAST CANCER SCREENING BY MAMMOGRAM: ICD-10-CM

## 2022-11-15 PROBLEM — E66.812 CLASS 2 SEVERE OBESITY WITH SERIOUS COMORBIDITY AND BODY MASS INDEX (BMI) OF 35.0 TO 35.9 IN ADULT (HCC): Status: ACTIVE | Noted: 2022-11-15

## 2022-11-15 NOTE — PROGRESS NOTES
Per patient she saw Frankey Guarneri on 9/28/2022 for her Dilated Eye Exam,will call and obtain report.

## 2022-11-21 ENCOUNTER — NURSE ONLY (OUTPATIENT)
Dept: HEMATOLOGY/ONCOLOGY | Facility: HOSPITAL | Age: 77
End: 2022-11-21
Attending: INTERNAL MEDICINE
Payer: MEDICARE

## 2022-11-21 VITALS
HEART RATE: 86 BPM | HEIGHT: 63.5 IN | OXYGEN SATURATION: 100 % | RESPIRATION RATE: 16 BRPM | DIASTOLIC BLOOD PRESSURE: 80 MMHG | TEMPERATURE: 98 F | SYSTOLIC BLOOD PRESSURE: 150 MMHG | WEIGHT: 201 LBS | BODY MASS INDEX: 35.17 KG/M2

## 2022-11-21 DIAGNOSIS — D45 POLYCYTHEMIA VERA (HCC): Primary | ICD-10-CM

## 2022-11-21 DIAGNOSIS — D45 POLYCYTHEMIA VERA (HCC): ICD-10-CM

## 2022-11-21 LAB
ALBUMIN SERPL-MCNC: 3.2 G/DL (ref 3.4–5)
ALBUMIN/GLOB SERPL: 0.7 {RATIO} (ref 1–2)
ALP LIVER SERPL-CCNC: 93 U/L
ALT SERPL-CCNC: 24 U/L
ANION GAP SERPL CALC-SCNC: 6 MMOL/L (ref 0–18)
AST SERPL-CCNC: 20 U/L (ref 15–37)
BASOPHILS # BLD AUTO: 0.06 X10(3) UL (ref 0–0.2)
BASOPHILS NFR BLD AUTO: 0.9 %
BILIRUB SERPL-MCNC: 0.5 MG/DL (ref 0.1–2)
BUN BLD-MCNC: 13 MG/DL (ref 7–18)
BUN/CREAT SERPL: 17.3 (ref 10–20)
CALCIUM BLD-MCNC: 9.1 MG/DL (ref 8.5–10.1)
CHLORIDE SERPL-SCNC: 106 MMOL/L (ref 98–112)
CO2 SERPL-SCNC: 28 MMOL/L (ref 21–32)
CREAT BLD-MCNC: 0.75 MG/DL
DEPRECATED RDW RBC AUTO: 58.4 FL (ref 35.1–46.3)
EOSINOPHIL # BLD AUTO: 0.13 X10(3) UL (ref 0–0.7)
EOSINOPHIL NFR BLD AUTO: 2 %
ERYTHROCYTE [DISTWIDTH] IN BLOOD BY AUTOMATED COUNT: 13 % (ref 11–15)
FASTING STATUS PATIENT QL REPORTED: NO
GFR SERPLBLD BASED ON 1.73 SQ M-ARVRAT: 82 ML/MIN/1.73M2 (ref 60–?)
GLOBULIN PLAS-MCNC: 4.3 G/DL (ref 2.8–4.4)
GLUCOSE BLD-MCNC: 126 MG/DL (ref 70–99)
HCT VFR BLD AUTO: 37.8 %
HGB BLD-MCNC: 12.9 G/DL
IMM GRANULOCYTES # BLD AUTO: 0.06 X10(3) UL (ref 0–1)
IMM GRANULOCYTES NFR BLD: 0.9 %
LYMPHOCYTES # BLD AUTO: 1.9 X10(3) UL (ref 1–4)
LYMPHOCYTES NFR BLD AUTO: 28.9 %
MCH RBC QN AUTO: 41.5 PG (ref 26–34)
MCHC RBC AUTO-ENTMCNC: 34.1 G/DL (ref 31–37)
MCV RBC AUTO: 121.5 FL
MONOCYTES # BLD AUTO: 0.72 X10(3) UL (ref 0.1–1)
MONOCYTES NFR BLD AUTO: 11 %
NEUTROPHILS # BLD AUTO: 3.7 X10 (3) UL (ref 1.5–7.7)
NEUTROPHILS # BLD AUTO: 3.7 X10(3) UL (ref 1.5–7.7)
NEUTROPHILS NFR BLD AUTO: 56.3 %
OSMOLALITY SERPL CALC.SUM OF ELEC: 292 MOSM/KG (ref 275–295)
PLATELET # BLD AUTO: 348 10(3)UL (ref 150–450)
POTASSIUM SERPL-SCNC: 4.2 MMOL/L (ref 3.5–5.1)
PROT SERPL-MCNC: 7.5 G/DL (ref 6.4–8.2)
RBC # BLD AUTO: 3.11 X10(6)UL
SODIUM SERPL-SCNC: 140 MMOL/L (ref 136–145)
WBC # BLD AUTO: 6.6 X10(3) UL (ref 4–11)

## 2022-11-21 PROCEDURE — 36415 COLL VENOUS BLD VENIPUNCTURE: CPT

## 2022-11-21 PROCEDURE — 85025 COMPLETE CBC W/AUTO DIFF WBC: CPT

## 2022-11-21 PROCEDURE — 80053 COMPREHEN METABOLIC PANEL: CPT

## 2022-11-21 PROCEDURE — 99211 OFF/OP EST MAY X REQ PHY/QHP: CPT

## 2022-12-08 ENCOUNTER — HOSPITAL ENCOUNTER (OUTPATIENT)
Dept: MAMMOGRAPHY | Age: 77
Discharge: HOME | End: 2022-12-08
Attending: INTERNAL MEDICINE
Payer: MEDICARE

## 2022-12-08 DIAGNOSIS — Z12.31 BREAST CANCER SCREENING BY MAMMOGRAM: ICD-10-CM

## 2022-12-08 PROCEDURE — 77063 BREAST TOMOSYNTHESIS BI: CPT | Performed by: INTERNAL MEDICINE

## 2022-12-08 PROCEDURE — 77067 SCR MAMMO BI INCL CAD: CPT | Performed by: INTERNAL MEDICINE

## 2022-12-27 ENCOUNTER — NURSE TRIAGE (OUTPATIENT)
Dept: INTERNAL MEDICINE CLINIC | Facility: CLINIC | Age: 77
End: 2022-12-27

## 2022-12-27 ENCOUNTER — HOSPITAL ENCOUNTER (OUTPATIENT)
Dept: MAMMOGRAPHY | Facility: HOSPITAL | Age: 77
Discharge: HOME | DRG: 247 | End: 2022-12-27
Attending: INTERNAL MEDICINE
Payer: MEDICARE

## 2022-12-27 ENCOUNTER — HOSPITAL ENCOUNTER (INPATIENT)
Facility: HOSPITAL | Age: 77
LOS: 2 days | Discharge: HOME | DRG: 247 | End: 2022-12-29
Attending: EMERGENCY MEDICINE | Admitting: INTERNAL MEDICINE
Payer: MEDICARE

## 2022-12-27 ENCOUNTER — APPOINTMENT (OUTPATIENT)
Dept: GENERAL RADIOLOGY | Facility: HOSPITAL | Age: 77
DRG: 247 | End: 2022-12-27
Payer: MEDICARE

## 2022-12-27 ENCOUNTER — HOSPITAL ENCOUNTER (OUTPATIENT)
Dept: ULTRASOUND IMAGING | Facility: HOSPITAL | Age: 77
Discharge: HOME | DRG: 247 | End: 2022-12-27
Attending: INTERNAL MEDICINE
Payer: MEDICARE

## 2022-12-27 DIAGNOSIS — R92.8 ABNORMAL MAMMOGRAM: ICD-10-CM

## 2022-12-27 DIAGNOSIS — R77.8 ELEVATED TROPONIN: ICD-10-CM

## 2022-12-27 DIAGNOSIS — I20.9 ANGINA PECTORIS (HCC): Primary | ICD-10-CM

## 2022-12-27 PROBLEM — R79.89 ELEVATED TROPONIN: Status: ACTIVE | Noted: 2022-12-27

## 2022-12-27 LAB
ALBUMIN SERPL-MCNC: 3.2 G/DL (ref 3.4–5)
ALP LIVER SERPL-CCNC: 93 U/L
ALT SERPL-CCNC: 24 U/L
ANION GAP SERPL CALC-SCNC: 8 MMOL/L (ref 0–18)
AST SERPL-CCNC: 21 U/L (ref 15–37)
ATRIAL RATE: 97 BPM
BILIRUB DIRECT SERPL-MCNC: 0.1 MG/DL (ref 0–0.2)
BILIRUB SERPL-MCNC: 0.3 MG/DL (ref 0.1–2)
BUN BLD-MCNC: 19 MG/DL (ref 7–18)
BUN/CREAT SERPL: 27.5 (ref 10–20)
CALCIUM BLD-MCNC: 9.5 MG/DL (ref 8.5–10.1)
CHLORIDE SERPL-SCNC: 106 MMOL/L (ref 98–112)
CHOLEST SERPL-MCNC: 161 MG/DL (ref ?–200)
CO2 SERPL-SCNC: 26 MMOL/L (ref 21–32)
CREAT BLD-MCNC: 0.69 MG/DL
FLUAV + FLUBV RNA SPEC NAA+PROBE: NEGATIVE
FLUAV + FLUBV RNA SPEC NAA+PROBE: NEGATIVE
GFR SERPLBLD BASED ON 1.73 SQ M-ARVRAT: 90 ML/MIN/1.73M2 (ref 60–?)
GLUCOSE BLD-MCNC: 89 MG/DL (ref 70–99)
GLUCOSE BLDC GLUCOMTR-MCNC: 89 MG/DL (ref 70–99)
HDLC SERPL-MCNC: 63 MG/DL (ref 40–59)
LDLC SERPL CALC-MCNC: 72 MG/DL (ref ?–100)
LIPASE SERPL-CCNC: 190 U/L (ref 73–393)
NONHDLC SERPL-MCNC: 98 MG/DL (ref ?–130)
OSMOLALITY SERPL CALC.SUM OF ELEC: 292 MOSM/KG (ref 275–295)
P AXIS: 61 DEGREES
P-R INTERVAL: 172 MS
POTASSIUM SERPL-SCNC: 3.7 MMOL/L (ref 3.5–5.1)
PROT SERPL-MCNC: 7.4 G/DL (ref 6.4–8.2)
Q-T INTERVAL: 340 MS
QRS DURATION: 80 MS
QTC CALCULATION (BEZET): 431 MS
R AXIS: -10 DEGREES
RSV RNA SPEC NAA+PROBE: NEGATIVE
SARS-COV-2 RNA RESP QL NAA+PROBE: NOT DETECTED
SODIUM SERPL-SCNC: 140 MMOL/L (ref 136–145)
T AXIS: 38 DEGREES
TRIGL SERPL-MCNC: 156 MG/DL (ref 30–149)
TROPONIN I HIGH SENSITIVITY: 55 NG/L
TROPONIN I HIGH SENSITIVITY: 61 NG/L
VENTRICULAR RATE: 97 BPM
VLDLC SERPL CALC-MCNC: 24 MG/DL (ref 0–30)

## 2022-12-27 PROCEDURE — 71045 X-RAY EXAM CHEST 1 VIEW: CPT

## 2022-12-27 PROCEDURE — 76642 ULTRASOUND BREAST LIMITED: CPT | Performed by: INTERNAL MEDICINE

## 2022-12-27 PROCEDURE — 77065 DX MAMMO INCL CAD UNI: CPT | Performed by: INTERNAL MEDICINE

## 2022-12-27 PROCEDURE — 77061 BREAST TOMOSYNTHESIS UNI: CPT | Performed by: INTERNAL MEDICINE

## 2022-12-27 RX ORDER — HYDROXYUREA 500 MG/1
500 CAPSULE ORAL NIGHTLY
Status: DISCONTINUED | OUTPATIENT
Start: 2022-12-27 | End: 2022-12-29

## 2022-12-27 RX ORDER — HYDROXYUREA 500 MG/1
1000 CAPSULE ORAL DAILY
Status: DISCONTINUED | OUTPATIENT
Start: 2022-12-28 | End: 2022-12-29

## 2022-12-27 RX ORDER — ASPIRIN 81 MG/1
324 TABLET, CHEWABLE ORAL ONCE
Status: COMPLETED | OUTPATIENT
Start: 2022-12-27 | End: 2022-12-27

## 2022-12-27 RX ORDER — ROSUVASTATIN CALCIUM 5 MG/1
5 TABLET, COATED ORAL NIGHTLY
Status: DISCONTINUED | OUTPATIENT
Start: 2022-12-27 | End: 2022-12-28

## 2022-12-27 RX ORDER — AMLODIPINE BESYLATE 10 MG/1
10 TABLET ORAL DAILY
Status: DISCONTINUED | OUTPATIENT
Start: 2022-12-27 | End: 2022-12-29

## 2022-12-27 RX ORDER — ASPIRIN 81 MG/1
81 TABLET, CHEWABLE ORAL DAILY
Status: DISCONTINUED | OUTPATIENT
Start: 2022-12-28 | End: 2022-12-28

## 2022-12-27 NOTE — ED INITIAL ASSESSMENT (HPI)
Chest pain and difficulty breathing, intermittent episodes, for 3 days.  + Cough for 1 week    Denies N/V/D, dizziness

## 2022-12-27 NOTE — ED QUICK NOTES
Orders for admission, patient is aware of plan and ready to go upstairs. Any questions, please call ED RN Clark Romero at extension [phone].      Patient Covid vaccination status: Fully vaccinated     COVID Test Ordered in ED: SARS-CoV-2/Flu A and B/RSV by PCR (GeneXpert)    COVID Suspicion at Admission: N/A    Running Infusions:  None    Mental Status/LOC at time of transport: x4    Other pertinent information:   CIWA score: N/A   NIH score:  N/A

## 2022-12-28 ENCOUNTER — APPOINTMENT (OUTPATIENT)
Dept: INTERVENTIONAL RADIOLOGY/VASCULAR | Facility: HOSPITAL | Age: 77
DRG: 247 | End: 2022-12-28
Payer: MEDICARE

## 2022-12-28 ENCOUNTER — APPOINTMENT (OUTPATIENT)
Dept: CV DIAGNOSTICS | Facility: HOSPITAL | Age: 77
DRG: 247 | End: 2022-12-28
Attending: INTERNAL MEDICINE
Payer: MEDICARE

## 2022-12-28 ENCOUNTER — APPOINTMENT (OUTPATIENT)
Dept: NUCLEAR MEDICINE | Facility: HOSPITAL | Age: 77
DRG: 247 | End: 2022-12-28
Attending: INTERNAL MEDICINE
Payer: MEDICARE

## 2022-12-28 ENCOUNTER — APPOINTMENT (OUTPATIENT)
Dept: CV DIAGNOSTICS | Facility: HOSPITAL | Age: 77
DRG: 247 | End: 2022-12-28
Payer: MEDICARE

## 2022-12-28 LAB
% OF MAX PREDICTED HR: 100 %
ANION GAP SERPL CALC-SCNC: 9 MMOL/L (ref 0–18)
ATRIAL RATE: 89 BPM
BASOPHILS # BLD AUTO: 0.05 X10(3) UL (ref 0–0.2)
BASOPHILS # BLD AUTO: 0.05 X10(3) UL (ref 0–0.2)
BASOPHILS NFR BLD AUTO: 0.7 %
BASOPHILS NFR BLD AUTO: 0.8 %
BUN BLD-MCNC: 15 MG/DL (ref 7–18)
BUN/CREAT SERPL: 23.1 (ref 10–20)
CALCIUM BLD-MCNC: 9.2 MG/DL (ref 8.5–10.1)
CHLORIDE SERPL-SCNC: 109 MMOL/L (ref 98–112)
CO2 SERPL-SCNC: 25 MMOL/L (ref 21–32)
CREAT BLD-MCNC: 0.65 MG/DL
DEPRECATED RDW RBC AUTO: 57.3 FL (ref 35.1–46.3)
DEPRECATED RDW RBC AUTO: 59.4 FL (ref 35.1–46.3)
EOSINOPHIL # BLD AUTO: 0.11 X10(3) UL (ref 0–0.7)
EOSINOPHIL # BLD AUTO: 0.11 X10(3) UL (ref 0–0.7)
EOSINOPHIL NFR BLD AUTO: 1.5 %
EOSINOPHIL NFR BLD AUTO: 1.9 %
ERYTHROCYTE [DISTWIDTH] IN BLOOD BY AUTOMATED COUNT: 13 % (ref 11–15)
ERYTHROCYTE [DISTWIDTH] IN BLOOD BY AUTOMATED COUNT: 13.3 % (ref 11–15)
GFR SERPLBLD BASED ON 1.73 SQ M-ARVRAT: 91 ML/MIN/1.73M2 (ref 60–?)
GLUCOSE BLD-MCNC: 116 MG/DL (ref 70–99)
GLUCOSE BLDC GLUCOMTR-MCNC: 103 MG/DL (ref 70–99)
GLUCOSE BLDC GLUCOMTR-MCNC: 118 MG/DL (ref 70–99)
GLUCOSE BLDC GLUCOMTR-MCNC: 262 MG/DL (ref 70–99)
HCT VFR BLD AUTO: 36.9 %
HCT VFR BLD AUTO: 39.2 %
HGB BLD-MCNC: 12.4 G/DL
HGB BLD-MCNC: 13.4 G/DL
IMM GRANULOCYTES # BLD AUTO: 0.11 X10(3) UL (ref 0–1)
IMM GRANULOCYTES # BLD AUTO: 0.14 X10(3) UL (ref 0–1)
IMM GRANULOCYTES NFR BLD: 1.9 %
IMM GRANULOCYTES NFR BLD: 1.9 %
LYMPHOCYTES # BLD AUTO: 1.96 X10(3) UL (ref 1–4)
LYMPHOCYTES # BLD AUTO: 2.27 X10(3) UL (ref 1–4)
LYMPHOCYTES NFR BLD AUTO: 31 %
LYMPHOCYTES NFR BLD AUTO: 33 %
MAX DIASTOLIC BP: 82 MMHG
MAX HEART RATE: 112 BPM
MAX PREDICTED HEART RATE: 144 BPM
MAX SYSTOLIC BP: 152 MMHG
MAX WORK LOAD: 10
MCH RBC QN AUTO: 40.5 PG (ref 26–34)
MCH RBC QN AUTO: 40.7 PG (ref 26–34)
MCHC RBC AUTO-ENTMCNC: 33.6 G/DL (ref 31–37)
MCHC RBC AUTO-ENTMCNC: 34.2 G/DL (ref 31–37)
MCV RBC AUTO: 118.4 FL
MCV RBC AUTO: 121 FL
MONOCYTES # BLD AUTO: 0.72 X10(3) UL (ref 0.1–1)
MONOCYTES # BLD AUTO: 0.85 X10(3) UL (ref 0.1–1)
MONOCYTES NFR BLD AUTO: 11.6 %
MONOCYTES NFR BLD AUTO: 12.1 %
NEUTROPHILS # BLD AUTO: 2.99 X10 (3) UL (ref 1.5–7.7)
NEUTROPHILS # BLD AUTO: 2.99 X10(3) UL (ref 1.5–7.7)
NEUTROPHILS # BLD AUTO: 3.9 X10 (3) UL (ref 1.5–7.7)
NEUTROPHILS # BLD AUTO: 3.9 X10(3) UL (ref 1.5–7.7)
NEUTROPHILS NFR BLD AUTO: 50.3 %
NEUTROPHILS NFR BLD AUTO: 53.3 %
OSMOLALITY SERPL CALC.SUM OF ELEC: 298 MOSM/KG (ref 275–295)
P AXIS: 66 DEGREES
P-R INTERVAL: 152 MS
PLATELET # BLD AUTO: 351 10(3)UL (ref 150–450)
PLATELET # BLD AUTO: 355 10(3)UL (ref 150–450)
POTASSIUM SERPL-SCNC: 3.8 MMOL/L (ref 3.5–5.1)
Q-T INTERVAL: 372 MS
QRS DURATION: 84 MS
QTC CALCULATION (BEZET): 452 MS
R AXIS: -77 DEGREES
RBC # BLD AUTO: 3.05 X10(6)UL
RBC # BLD AUTO: 3.31 X10(6)UL
SODIUM SERPL-SCNC: 143 MMOL/L (ref 136–145)
T AXIS: 82 DEGREES
TROPONIN I HIGH SENSITIVITY: 64 NG/L
VENTRICULAR RATE: 89 BPM
WBC # BLD AUTO: 5.9 X10(3) UL (ref 4–11)
WBC # BLD AUTO: 7.3 X10(3) UL (ref 4–11)

## 2022-12-28 PROCEDURE — 93017 CV STRESS TEST TRACING ONLY: CPT | Performed by: INTERNAL MEDICINE

## 2022-12-28 PROCEDURE — 4A023N7 MEASUREMENT OF CARDIAC SAMPLING AND PRESSURE, LEFT HEART, PERCUTANEOUS APPROACH: ICD-10-PCS | Performed by: INTERNAL MEDICINE

## 2022-12-28 PROCEDURE — B2111ZZ FLUOROSCOPY OF MULTIPLE CORONARY ARTERIES USING LOW OSMOLAR CONTRAST: ICD-10-PCS | Performed by: INTERNAL MEDICINE

## 2022-12-28 PROCEDURE — 93306 TTE W/DOPPLER COMPLETE: CPT

## 2022-12-28 PROCEDURE — 99223 1ST HOSP IP/OBS HIGH 75: CPT | Performed by: INTERNAL MEDICINE

## 2022-12-28 PROCEDURE — 78452 HT MUSCLE IMAGE SPECT MULT: CPT | Performed by: INTERNAL MEDICINE

## 2022-12-28 PROCEDURE — 027034Z DILATION OF CORONARY ARTERY, ONE ARTERY WITH DRUG-ELUTING INTRALUMINAL DEVICE, PERCUTANEOUS APPROACH: ICD-10-PCS | Performed by: INTERNAL MEDICINE

## 2022-12-28 RX ORDER — CHLORHEXIDINE GLUCONATE 4 G/100ML
30 SOLUTION TOPICAL
Status: ACTIVE | OUTPATIENT
Start: 2022-12-29 | End: 2022-12-29

## 2022-12-28 RX ORDER — NICOTINE POLACRILEX 4 MG
30 LOZENGE BUCCAL
Status: DISCONTINUED | OUTPATIENT
Start: 2022-12-28 | End: 2022-12-29

## 2022-12-28 RX ORDER — ASPIRIN 81 MG/1
243 TABLET, CHEWABLE ORAL DAILY
Status: DISCONTINUED | OUTPATIENT
Start: 2022-12-28 | End: 2022-12-28

## 2022-12-28 RX ORDER — POLYETHYLENE GLYCOL 3350 17 G/17G
17 POWDER, FOR SOLUTION ORAL DAILY PRN
Status: DISCONTINUED | OUTPATIENT
Start: 2022-12-28 | End: 2022-12-29

## 2022-12-28 RX ORDER — NICOTINE POLACRILEX 4 MG
15 LOZENGE BUCCAL
Status: DISCONTINUED | OUTPATIENT
Start: 2022-12-28 | End: 2022-12-29

## 2022-12-28 RX ORDER — ASPIRIN 325 MG
325 TABLET ORAL DAILY
Status: DISCONTINUED | OUTPATIENT
Start: 2022-12-28 | End: 2022-12-28

## 2022-12-28 RX ORDER — SODIUM CHLORIDE 9 MG/ML
INJECTION, SOLUTION INTRAVENOUS
Status: ACTIVE | OUTPATIENT
Start: 2022-12-29 | End: 2022-12-29

## 2022-12-28 RX ORDER — NITROGLYCERIN 20 MG/100ML
INJECTION INTRAVENOUS
Status: COMPLETED
Start: 2022-12-28 | End: 2022-12-28

## 2022-12-28 RX ORDER — ACETAMINOPHEN 500 MG
500 TABLET ORAL EVERY 4 HOURS PRN
Status: DISCONTINUED | OUTPATIENT
Start: 2022-12-28 | End: 2022-12-29

## 2022-12-28 RX ORDER — SODIUM PHOSPHATE, DIBASIC AND SODIUM PHOSPHATE, MONOBASIC 7; 19 G/133ML; G/133ML
1 ENEMA RECTAL ONCE AS NEEDED
Status: DISCONTINUED | OUTPATIENT
Start: 2022-12-28 | End: 2022-12-29

## 2022-12-28 RX ORDER — MONTELUKAST SODIUM 10 MG/1
10 TABLET ORAL NIGHTLY
Status: DISCONTINUED | OUTPATIENT
Start: 2022-12-28 | End: 2022-12-29

## 2022-12-28 RX ORDER — ACETAMINOPHEN AND CODEINE PHOSPHATE 300; 30 MG/1; MG/1
1 TABLET ORAL EVERY 4 HOURS PRN
Status: DISCONTINUED | OUTPATIENT
Start: 2022-12-28 | End: 2022-12-29

## 2022-12-28 RX ORDER — MORPHINE SULFATE 2 MG/ML
1 INJECTION, SOLUTION INTRAMUSCULAR; INTRAVENOUS EVERY 2 HOUR PRN
Status: DISCONTINUED | OUTPATIENT
Start: 2022-12-28 | End: 2022-12-29

## 2022-12-28 RX ORDER — MAGNESIUM OXIDE 400 MG (241.3 MG MAGNESIUM) TABLET
3 TABLET NIGHTLY PRN
Status: DISCONTINUED | OUTPATIENT
Start: 2022-12-28 | End: 2022-12-29

## 2022-12-28 RX ORDER — ACETAMINOPHEN AND CODEINE PHOSPHATE 300; 30 MG/1; MG/1
2 TABLET ORAL EVERY 4 HOURS PRN
Status: DISCONTINUED | OUTPATIENT
Start: 2022-12-28 | End: 2022-12-29

## 2022-12-28 RX ORDER — ACETAMINOPHEN 325 MG/1
650 TABLET ORAL EVERY 4 HOURS PRN
Status: DISCONTINUED | OUTPATIENT
Start: 2022-12-28 | End: 2022-12-29

## 2022-12-28 RX ORDER — DEXTROSE MONOHYDRATE 25 G/50ML
50 INJECTION, SOLUTION INTRAVENOUS
Status: DISCONTINUED | OUTPATIENT
Start: 2022-12-28 | End: 2022-12-29

## 2022-12-28 RX ORDER — BISACODYL 10 MG
10 SUPPOSITORY, RECTAL RECTAL
Status: DISCONTINUED | OUTPATIENT
Start: 2022-12-28 | End: 2022-12-29

## 2022-12-28 RX ORDER — SODIUM CHLORIDE 9 MG/ML
INJECTION, SOLUTION INTRAVENOUS CONTINUOUS
Status: ACTIVE | OUTPATIENT
Start: 2022-12-28 | End: 2022-12-28

## 2022-12-28 RX ORDER — NITROGLYCERIN 0.4 MG/1
0.4 TABLET SUBLINGUAL EVERY 5 MIN PRN
Status: DISCONTINUED | OUTPATIENT
Start: 2022-12-28 | End: 2022-12-29

## 2022-12-28 RX ORDER — MORPHINE SULFATE 2 MG/ML
2 INJECTION, SOLUTION INTRAMUSCULAR; INTRAVENOUS EVERY 2 HOUR PRN
Status: DISCONTINUED | OUTPATIENT
Start: 2022-12-28 | End: 2022-12-29

## 2022-12-28 RX ORDER — MIDAZOLAM HYDROCHLORIDE 1 MG/ML
INJECTION INTRAMUSCULAR; INTRAVENOUS
Status: COMPLETED
Start: 2022-12-28 | End: 2022-12-28

## 2022-12-28 RX ORDER — ONDANSETRON 2 MG/ML
4 INJECTION INTRAMUSCULAR; INTRAVENOUS EVERY 6 HOURS PRN
Status: DISCONTINUED | OUTPATIENT
Start: 2022-12-28 | End: 2022-12-29

## 2022-12-28 RX ORDER — LIDOCAINE HYDROCHLORIDE 20 MG/ML
INJECTION, SOLUTION EPIDURAL; INFILTRATION; INTRACAUDAL; PERINEURAL
Status: COMPLETED
Start: 2022-12-28 | End: 2022-12-28

## 2022-12-28 RX ORDER — HEPARIN SODIUM 1000 [USP'U]/ML
INJECTION, SOLUTION INTRAVENOUS; SUBCUTANEOUS
Status: DISCONTINUED
Start: 2022-12-28 | End: 2022-12-28 | Stop reason: WASHOUT

## 2022-12-28 RX ORDER — ASPIRIN 81 MG/1
TABLET, CHEWABLE ORAL
Status: COMPLETED
Start: 2022-12-28 | End: 2022-12-28

## 2022-12-28 RX ORDER — MORPHINE SULFATE 4 MG/ML
4 INJECTION, SOLUTION INTRAMUSCULAR; INTRAVENOUS EVERY 2 HOUR PRN
Status: DISCONTINUED | OUTPATIENT
Start: 2022-12-28 | End: 2022-12-29

## 2022-12-28 RX ORDER — METOCLOPRAMIDE HYDROCHLORIDE 5 MG/ML
10 INJECTION INTRAMUSCULAR; INTRAVENOUS EVERY 8 HOURS PRN
Status: DISCONTINUED | OUTPATIENT
Start: 2022-12-28 | End: 2022-12-29

## 2022-12-28 RX ORDER — ROSUVASTATIN CALCIUM 10 MG/1
20 TABLET, COATED ORAL NIGHTLY
Status: DISCONTINUED | OUTPATIENT
Start: 2022-12-28 | End: 2022-12-29

## 2022-12-28 RX ORDER — SENNOSIDES 8.6 MG
17.2 TABLET ORAL NIGHTLY PRN
Status: DISCONTINUED | OUTPATIENT
Start: 2022-12-28 | End: 2022-12-29

## 2022-12-28 RX ORDER — ASPIRIN 81 MG/1
81 TABLET ORAL DAILY
Status: DISCONTINUED | OUTPATIENT
Start: 2022-12-29 | End: 2022-12-29

## 2022-12-28 NOTE — PLAN OF CARE
Nuclear stress test with findings of focal reversible ischemia at the LV apex. Dicussed with Dr. Jorge Alberto Boyd, will proceed with LHC. RN and Patient updated on plan of care and risks/benefits of angiogram discussed and patient wishes to proceed. Keep NPO for C today.     GERARDO Hudson  12/28/2022  12:19 PM

## 2022-12-28 NOTE — PROCEDURES
BRIEF CATH NOTE    Preop: Unstable angina  Postop: Unstable angina  Procedure: Left heart cath, coronary angiogram.  PCI of mid LAD. Angio-Seal to RFA. Findings: LVEDP 16. No LV angiogram.  Coronaries: 90% stenosis in LAD just after takeoff of large first diagonal and major septal branch.   Coronaries otherwise essentially normal.    PCI: Mid LAD  Guiding catheter: 3.75 EBU  Guidewire: BMW  Predilatation: 2.5 balloon  Stent: 3.0 x 12 YIN  Post deployment: 3.25 x 12 NC to 20 federico  Stenosis 95% to 0%  PATT flow 2 pre and 3 post PCI    Sedation: Versed and fentanyl, monitored for 38 minutes  EBL: 5 mL  Specimen: None  Complication: None  Condition: Stable    Full report to follow

## 2022-12-28 NOTE — PROGRESS NOTES
Chart reviewed and patient examined. Presents with chest pain with some features consistent with angina. Troponins show very low flat increase. EKG without acute abnormality. Nuclear stress test shows focal reversible ischemic defect at the apex. Echo shows normal LV size and contractility and no other significant findings. With symptoms and abnormal nuclear stress test, will recommend cardiac catheterization. The risks (including but not limited to death, stroke, MI, vascular injury requiring surgical correction, need for emergency bypass surgery in the setting of a failed PCI), benefits, and alternative approaches were explained to the patient who expressed understanding and agrees to proceed. Significant comorbidities include polycythemia vera.

## 2022-12-28 NOTE — PLAN OF CARE
Pt rec'd from ED. Reports no further chest pain at this time. Tolerating room air. Ambulates in room without difficulty. Call light within easy reach. VSS. No acute distress noted. Problem: CARDIOVASCULAR - ADULT  Goal: Maintains optimal cardiac output and hemodynamic stability  Description: INTERVENTIONS:  - Monitor vital signs, rhythm, and trends  - Monitor for bleeding, hypotension and signs of decreased cardiac output  - Evaluate effectiveness of vasoactive medications to optimize hemodynamic stability  - Monitor arterial and/or venous puncture sites for bleeding and/or hematoma  - Assess quality of pulses, skin color and temperature  - Assess for signs of decreased coronary artery perfusion - ex.  Angina  - Evaluate fluid balance, assess for edema, trend weights  Outcome: Progressing  Goal: Absence of cardiac arrhythmias or at baseline  Description: INTERVENTIONS:  - Continuous cardiac monitoring, monitor vital signs, obtain 12 lead EKG if indicated  - Evaluate effectiveness of antiarrhythmic and heart rate control medications as ordered  - Initiate emergency measures for life threatening arrhythmias  - Monitor electrolytes and administer replacement therapy as ordered  Outcome: Progressing     Problem: RESPIRATORY - ADULT  Goal: Achieves optimal ventilation and oxygenation  Description: INTERVENTIONS:  - Assess for changes in respiratory status  - Assess for changes in mentation and behavior  - Position to facilitate oxygenation and minimize respiratory effort  - Oxygen supplementation based on oxygen saturation or ABGs  - Provide Smoking Cessation handout, if applicable  - Encourage broncho-pulmonary hygiene including cough, deep breathe, Incentive Spirometry  - Assess the need for suctioning and perform as needed  - Assess and instruct to report SOB or any respiratory difficulty  - Respiratory Therapy support as indicated  - Manage/alleviate anxiety  - Monitor for signs/symptoms of CO2 retention  Outcome: Progressing

## 2022-12-28 NOTE — TELEPHONE ENCOUNTER
The patient was admitted to the [facility] on 12/27/22

## 2022-12-28 NOTE — PROCEDURES
[facility]    PATIENT'S NAME: Tucker Dominguez   ATTENDING PHYSICIAN: Yuliet Caldwell MD   OPERATING PHYSICIAN: Manuela Hinson. Miranda Galdamez MD   PATIENT ACCOUNT#:   [id number]    LOCATION:  [address] [place] [address] #:   [id number]       YOB: 1945  ADMISSION DATE:       12/27/2022      OPERATION DATE:  12/28/2022    CARDIAC PROCEDURE TRANSCRIPTION      CARDIAC CATHETERIZATION/PERCUTANEOUS CORONARY INTERVENTION   PREOPERATIVE DIAGNOSIS:    POSTOPERATIVE DIAGNOSIS:    PROCEDURE PERFORMED:      CLINICAL HISTORY:  A 79-year-old female presenting with intermittent chest pain and mildly elevated troponins. Reversible ischemia at the apex of the left ventricle. Cardiac catheterization recommended. DESCRIPTION OF PROCEDURE:  Left heart catheterization from the right femoral approach. The right femoral artery was entered percutaneously, and a 6-Upper sorbian sheath was placed for arterial access. Coronary angiogram was performed using 4 cm 6-Upper sorbian right and left Hayley catheters. A 6-Upper sorbian pigtail catheter was used for left heart pressures. Left ventriculogram was not performed. Left ventricular function and ejection fraction available on echo done earlier today. After the diagnostic angiogram, we proceeded to coronary intervention which is dictated below. Finally, a limited right iliofemoral angiogram was performed, followed by placement of an Angio-Seal closure with hemostasis achieved. There were no complications, and the patient tolerated the procedure well. HEMODYNAMICS:  LVEDP was 16. There was no aortic valve gradient. Systemic pressures were normal.  The rhythm was stable. FLUOROSCOPY:  No coronary or vascular calcification is noted. LEFT CORONARY ANGIOGRAPHY:  The left main coronary artery is normal and bifurcates normally into the LAD and circumflex.     The left anterior descending artery has a high takeoff first diagonal branch, which then bifurcates in its proximal portion into 2 secondary branches. The total territory supplied by this diagonal is large. The more medial of the secondary branches of the diagonal has a moderate stenosis in its midvessel. The more lateral one is of larger caliber and has minimal plaque. Just after the takeoff of that diagonal and the first major large septal , the LAD has a subtotal focal stenosis. There is PATT 2 flow beyond that area. The remainder of the LAD is essentially normal, and it reaches and wraps around the cardiac apex. There are no other significant diagonal branches seen. The circumflex artery is nondominant. It is normal throughout its course. It gives off a small first obtuse marginal, which has a focal 70% stenosis in its proximal portion. The circumflex then gives off 2 small obtuse marginal branches before bifurcating into 2 moderate-sized posterolateral branches distally. RIGHT CORONARY ANGIOGRAPHY:  The right coronary artery is anatomically dominant. It is normal except for mild tapering in the midvessel of about 25% to 30%. It gives off an acute marginal branch which supplies the inferior septum and then 2 or 3 small LV branches distally, all of which are free of disease. SUMMARY OF DIAGNOSTIC ANGIOGRAM:    1.   Subtotal stenosis of the mid LAD just at the takeoff of the major septal  and diagonal branches. 2.   Moderate disease of the first obtuse marginal and the secondary branch of the diagonal.    3.   Otherwise, minimal nonobstructive coronary artery disease. 4.   Mild elevation of left ventricular filling pressures. CORONARY INTERVENTION:  Angiomax bolus and infusion were administered. A 3.75 cm EBU guiding catheter was advanced to the ostium of the left main. A Balance Middleweight wire was advanced easily across the LAD stenosis and placed in the apical segment. Predilatation of the stenosis was done with a 2.5 mm balloon. Full balloon expansion was seen.   Subsequent angiography revealed improved patency at the LAD stenosis. We then stented the lesion with a 3.0 x 12 mm drug-eluting stent deployed at nominal pressures. Subsequent angiography revealed good catheter positioning and no impingement on the diagonal or septal  branches. Finally, we took a 3.25 x 12 mm noncompliant balloon and postdilated this stent to 20 atmospheres. Final angiographic results revealed wide patency with no residual stenosis and brisk distal flow. Injection via the right femoral sheath showed patent vessels and good catheter position. SUMMARY OF INTERVENTION:  Successful stenting of the mid LAD with a 3.0 x 12 mm drug-eluting stent, postdilated to 3.25 mm. Stenosis reduced from 95% to 0%. Flow improved from PATT 2 to PATT 3. Dictated By Viola Mealing.  Jodee Mayberry MD  d: 12/28/2022 14:24:40  t: 12/28/2022 15:20:21  [facility] [id number]  [facility]/

## 2022-12-29 VITALS
BODY MASS INDEX: 35.91 KG/M2 | TEMPERATURE: 98 F | SYSTOLIC BLOOD PRESSURE: 130 MMHG | OXYGEN SATURATION: 97 % | HEART RATE: 113 BPM | WEIGHT: 202.69 LBS | HEIGHT: 63 IN | RESPIRATION RATE: 16 BRPM | DIASTOLIC BLOOD PRESSURE: 72 MMHG

## 2022-12-29 DIAGNOSIS — R92.8 ABNORMAL MAMMOGRAM: Primary | ICD-10-CM

## 2022-12-29 LAB
ALBUMIN SERPL-MCNC: 3.7 G/DL (ref 3.4–5)
ALBUMIN/GLOB SERPL: 0.8 {RATIO} (ref 1–2)
ALP LIVER SERPL-CCNC: 98 U/L
ALT SERPL-CCNC: 26 U/L
ANION GAP SERPL CALC-SCNC: 8 MMOL/L (ref 0–18)
AST SERPL-CCNC: 28 U/L (ref 15–37)
BILIRUB SERPL-MCNC: 0.7 MG/DL (ref 0.1–2)
BUN BLD-MCNC: 14 MG/DL (ref 7–18)
BUN/CREAT SERPL: 18.9 (ref 10–20)
CALCIUM BLD-MCNC: 9.8 MG/DL (ref 8.5–10.1)
CHLORIDE SERPL-SCNC: 105 MMOL/L (ref 98–112)
CHOLEST SERPL-MCNC: 166 MG/DL (ref ?–200)
CO2 SERPL-SCNC: 26 MMOL/L (ref 21–32)
CREAT BLD-MCNC: 0.74 MG/DL
DEPRECATED RDW RBC AUTO: 57.6 FL (ref 35.1–46.3)
ERYTHROCYTE [DISTWIDTH] IN BLOOD BY AUTOMATED COUNT: 13 % (ref 11–15)
GFR SERPLBLD BASED ON 1.73 SQ M-ARVRAT: 84 ML/MIN/1.73M2 (ref 60–?)
GLOBULIN PLAS-MCNC: 4.6 G/DL (ref 2.8–4.4)
GLUCOSE BLD-MCNC: 146 MG/DL (ref 70–99)
GLUCOSE BLDC GLUCOMTR-MCNC: 130 MG/DL (ref 70–99)
GLUCOSE BLDC GLUCOMTR-MCNC: 263 MG/DL (ref 70–99)
HCT VFR BLD AUTO: 41.9 %
HDLC SERPL-MCNC: 66 MG/DL (ref 40–59)
HGB BLD-MCNC: 14.4 G/DL
LDLC SERPL CALC-MCNC: 78 MG/DL (ref ?–100)
MCH RBC QN AUTO: 40.8 PG (ref 26–34)
MCHC RBC AUTO-ENTMCNC: 34.4 G/DL (ref 31–37)
MCV RBC AUTO: 118.7 FL
NONHDLC SERPL-MCNC: 100 MG/DL (ref ?–130)
OSMOLALITY SERPL CALC.SUM OF ELEC: 291 MOSM/KG (ref 275–295)
PLATELET # BLD AUTO: 384 10(3)UL (ref 150–450)
POTASSIUM SERPL-SCNC: 3.8 MMOL/L (ref 3.5–5.1)
PROT SERPL-MCNC: 8.3 G/DL (ref 6.4–8.2)
RBC # BLD AUTO: 3.53 X10(6)UL
SODIUM SERPL-SCNC: 139 MMOL/L (ref 136–145)
TRIGL SERPL-MCNC: 128 MG/DL (ref 30–149)
VLDLC SERPL CALC-MCNC: 20 MG/DL (ref 0–30)
WBC # BLD AUTO: 6.9 X10(3) UL (ref 4–11)

## 2022-12-29 PROCEDURE — 99233 SBSQ HOSP IP/OBS HIGH 50: CPT | Performed by: INTERNAL MEDICINE

## 2022-12-29 RX ORDER — POTASSIUM CHLORIDE 20 MEQ/1
40 TABLET, EXTENDED RELEASE ORAL ONCE
Status: COMPLETED | OUTPATIENT
Start: 2022-12-29 | End: 2022-12-29

## 2022-12-29 RX ORDER — CARVEDILOL 3.12 MG/1
3.12 TABLET ORAL 2 TIMES DAILY WITH MEALS
Qty: 180 TABLET | Refills: 0 | Status: ON HOLD | OUTPATIENT
Start: 2022-12-29

## 2022-12-29 RX ORDER — LOSARTAN POTASSIUM 25 MG/1
25 TABLET ORAL DAILY
Qty: 90 TABLET | Refills: 0 | Status: ON HOLD | OUTPATIENT
Start: 2022-12-30

## 2022-12-29 RX ORDER — CARVEDILOL 3.12 MG/1
3.12 TABLET ORAL 2 TIMES DAILY WITH MEALS
Status: DISCONTINUED | OUTPATIENT
Start: 2022-12-29 | End: 2022-12-29

## 2022-12-29 RX ORDER — LOSARTAN POTASSIUM 25 MG/1
25 TABLET ORAL DAILY
Status: DISCONTINUED | OUTPATIENT
Start: 2022-12-29 | End: 2022-12-29

## 2022-12-29 NOTE — PLAN OF CARE
Patient is alert & oriented x4 on room air. Vital signs stable at this time. No acute changes noted throughout shift; patient slept. 0 calls from tele. Complained of some soreness, tylenol given. Fall precautions maintained - bed alarm on, bed locked in lowest position, call light and personal belongings within reach, non-skid socks in place. Frequent rounding by nursing staff. Plan possible discharge. Problem: Patient Centered Care  Goal: Patient preferences are identified and integrated in the patient's plan of care  Description: Interventions:  - What would you like us to know as we care for you?  From home with children  - Provide timely, complete, and accurate information to patient/family  - Incorporate patient and family knowledge, values, beliefs, and cultural backgrounds into the planning and delivery of care  - Encourage patient/family to participate in care and decision-making at the level they choose  - Honor patient and family perspectives and choices  Outcome: Progressing     Problem: Patient/Family Goals  Goal: Patient/Family Long Term Goal  Description: Patient's Long Term Goal: Discharge home    Interventions:  - early ambulation  - See additional Care Plan goals for specific interventions  Outcome: Progressing  Goal: Patient/Family Short Term Goal  Description: Patient's Short Term Goal: find out what caused CP    Interventions:   - serial troponins, 2d echo  - See additional Care Plan goals for specific interventions  Outcome: Progressing     Problem: CARDIOVASCULAR - ADULT  Goal: Maintains optimal cardiac output and hemodynamic stability  Description: INTERVENTIONS:  - Monitor vital signs, rhythm, and trends  - Monitor for bleeding, hypotension and signs of decreased cardiac output  - Evaluate effectiveness of vasoactive medications to optimize hemodynamic stability  - Monitor arterial and/or venous puncture sites for bleeding and/or hematoma  - Assess quality of pulses, skin color and temperature  - Assess for signs of decreased coronary artery perfusion - ex.  Angina  - Evaluate fluid balance, assess for edema, trend weights  Outcome: Progressing  Goal: Absence of cardiac arrhythmias or at baseline  Description: INTERVENTIONS:  - Continuous cardiac monitoring, monitor vital signs, obtain 12 lead EKG if indicated  - Evaluate effectiveness of antiarrhythmic and heart rate control medications as ordered  - Initiate emergency measures for life threatening arrhythmias  - Monitor electrolytes and administer replacement therapy as ordered  Outcome: Progressing     Problem: RESPIRATORY - ADULT  Goal: Achieves optimal ventilation and oxygenation  Description: INTERVENTIONS:  - Assess for changes in respiratory status  - Assess for changes in mentation and behavior  - Position to facilitate oxygenation and minimize respiratory effort  - Oxygen supplementation based on oxygen saturation or ABGs  - Provide Smoking Cessation handout, if applicable  - Encourage broncho-pulmonary hygiene including cough, deep breathe, Incentive Spirometry  - Assess the need for suctioning and perform as needed  - Assess and instruct to report SOB or any respiratory difficulty  - Respiratory Therapy support as indicated  - Manage/alleviate anxiety  - Monitor for signs/symptoms of CO2 retention  Outcome: Progressing

## 2022-12-29 NOTE — DISCHARGE INSTRUCTIONS
Check blood pressures twice a day and sugars twice a day.   Blood pressure machine should have heart rate on it and follow-up with cardiology and PCP

## 2022-12-29 NOTE — CARDIAC REHAB
Cardiac Rehab Phase I    Activity:  Distance   Assistance needed   Patient tolerated activity . Education:  Handouts provided and reviewed: [address] Diet: Healthy Cardiac diet reviewed. Disease Process: Disease process reviewed.     Reviewed the following:      RISK FACTORS: Reviewed      SMOKING CESSATION: Reviewed      HOME EXERCISE ACTIVITY: Reviewed      OUTPATIENT CARDIAC REHAB: Referred to Cardiac Rehabilitation

## 2022-12-29 NOTE — PLAN OF CARE
Patient complaining of mild heart \"ticks\", currently denies any chest pain. Patient medically cleared for discharge. Education completed, [employer] given. Tele and IV removed. Problem: Patient Centered Care  Goal: Patient preferences are identified and integrated in the patient's plan of care  Description: Interventions:  - What would you like us to know as we care for you? From home with children  - Provide timely, complete, and accurate information to patient/family  - Incorporate patient and family knowledge, values, beliefs, and cultural backgrounds into the planning and delivery of care  - Encourage patient/family to participate in care and decision-making at the level they choose  - Honor patient and family perspectives and choices  Outcome: Completed     Problem: Patient/Family Goals  Goal: Patient/Family Long Term Goal  Description: Patient's Long Term Goal: Discharge home    Interventions:  - early ambulation  - See additional Care Plan goals for specific interventions  Outcome: Completed  Goal: Patient/Family Short Term Goal  Description: Patient's Short Term Goal: find out what caused CP    Interventions:   - serial troponins, 2d echo  - See additional Care Plan goals for specific interventions  Outcome: Completed     Problem: CARDIOVASCULAR - ADULT  Goal: Maintains optimal cardiac output and hemodynamic stability  Description: INTERVENTIONS:  - Monitor vital signs, rhythm, and trends  - Monitor for bleeding, hypotension and signs of decreased cardiac output  - Evaluate effectiveness of vasoactive medications to optimize hemodynamic stability  - Monitor arterial and/or venous puncture sites for bleeding and/or hematoma  - Assess quality of pulses, skin color and temperature  - Assess for signs of decreased coronary artery perfusion - ex.  Angina  - Evaluate fluid balance, assess for edema, trend weights  Outcome: Completed  Goal: Absence of cardiac arrhythmias or at baseline  Description: INTERVENTIONS:  - Continuous cardiac monitoring, monitor vital signs, obtain 12 lead EKG if indicated  - Evaluate effectiveness of antiarrhythmic and heart rate control medications as ordered  - Initiate emergency measures for life threatening arrhythmias  - Monitor electrolytes and administer replacement therapy as ordered  Outcome: Completed     Problem: RESPIRATORY - ADULT  Goal: Achieves optimal ventilation and oxygenation  Description: INTERVENTIONS:  - Assess for changes in respiratory status  - Assess for changes in mentation and behavior  - Position to facilitate oxygenation and minimize respiratory effort  - Oxygen supplementation based on oxygen saturation or ABGs  - Provide Smoking Cessation handout, if applicable  - Encourage broncho-pulmonary hygiene including cough, deep breathe, Incentive Spirometry  - Assess the need for suctioning and perform as needed  - Assess and instruct to report SOB or any respiratory difficulty  - Respiratory Therapy support as indicated  - Manage/alleviate anxiety  - Monitor for signs/symptoms of CO2 retention  Outcome: Completed

## 2022-12-29 NOTE — DIETARY NOTE
NUTRITION EDUCATION NOTE     Received consult for cardiac nutrition education. Verbally reviewed basic cardiac diet restrictions. Provided with Eating Heart-Healthy and NCM handout to reinforce. Receptive to instruction. Would benefit from outpt f/u. Expect fair compliance.         Sola Flower RD, SAHILN  Clinical Dietitian  P: [phone]

## 2022-12-29 NOTE — DISCHARGE PLANNING
I called the pharmacy below to inquire about the copay of Brilinta before patient discharges. [facility] [address] [place] [address] [phone]      Per Radha, the patient's copay is $47 for 30 day supply. It is already filled for the patient.      Minoo Sinha RN, Discharge Leader [id number]

## 2022-12-29 NOTE — PLAN OF CARE
Patient alert and oriented x 4 vitals stable on room air. Patient has mild chest ache post cath procedure, patient did not want pain medications. Patient off bedrest from Cath procedure, Angioseal in right femoral. No pain, bruising or hematomas. Plan to continue to monitor patient and discharge once medically cleared. Call light within reach    Problem: Patient Centered Care  Goal: Patient preferences are identified and integrated in the patient's plan of care  Description: Interventions:  - What would you like us to know as we care for you? From home. - Provide timely, complete, and accurate information to patient/family  - Incorporate patient and family knowledge, values, beliefs, and cultural backgrounds into the planning and delivery of care  - Encourage patient/family to participate in care and decision-making at the level they choose  - Honor patient and family perspectives and choices  Outcome: Progressing     Problem: Patient/Family Goals  Goal: Patient/Family Long Term Goal  Description: Patient's Long Term Goal: Discharge home    Interventions:  - early ambulation  - See additional Care Plan goals for specific interventions  Outcome: Progressing  Goal: Patient/Family Short Term Goal  Description: Patient's Short Term Goal: find out what caused CP    Interventions:   - serial troponins, 2d echo  - See additional Care Plan goals for specific interventions  Outcome: Progressing     Problem: CARDIOVASCULAR - ADULT  Goal: Maintains optimal cardiac output and hemodynamic stability  Description: INTERVENTIONS:  - Monitor vital signs, rhythm, and trends  - Monitor for bleeding, hypotension and signs of decreased cardiac output  - Evaluate effectiveness of vasoactive medications to optimize hemodynamic stability  - Monitor arterial and/or venous puncture sites for bleeding and/or hematoma  - Assess quality of pulses, skin color and temperature  - Assess for signs of decreased coronary artery perfusion - ex. Angina  - Evaluate fluid balance, assess for edema, trend weights  Outcome: Progressing  Goal: Absence of cardiac arrhythmias or at baseline  Description: INTERVENTIONS:  - Continuous cardiac monitoring, monitor vital signs, obtain 12 lead EKG if indicated  - Evaluate effectiveness of antiarrhythmic and heart rate control medications as ordered  - Initiate emergency measures for life threatening arrhythmias  - Monitor electrolytes and administer replacement therapy as ordered  Outcome: Progressing     Problem: RESPIRATORY - ADULT  Goal: Achieves optimal ventilation and oxygenation  Description: INTERVENTIONS:  - Assess for changes in respiratory status  - Assess for changes in mentation and behavior  - Position to facilitate oxygenation and minimize respiratory effort  - Oxygen supplementation based on oxygen saturation or ABGs  - Provide Smoking Cessation handout, if applicable  - Encourage broncho-pulmonary hygiene including cough, deep breathe, Incentive Spirometry  - Assess the need for suctioning and perform as needed  - Assess and instruct to report SOB or any respiratory difficulty  - Respiratory Therapy support as indicated  - Manage/alleviate anxiety  - Monitor for signs/symptoms of CO2 retention  Outcome: Progressing

## 2022-12-30 ENCOUNTER — PATIENT OUTREACH (OUTPATIENT)
Dept: CASE MANAGEMENT | Age: 77
End: 2022-12-30

## 2022-12-30 ENCOUNTER — TELEPHONE (OUTPATIENT)
Facility: CLINIC | Age: 77
End: 2022-12-30

## 2022-12-30 DIAGNOSIS — Z02.9 ENCOUNTERS FOR UNSPECIFIED ADMINISTRATIVE PURPOSE: ICD-10-CM

## 2022-12-30 DIAGNOSIS — I20.9 ANGINA PECTORIS (HCC): ICD-10-CM

## 2022-12-30 PROCEDURE — 1111F DSCHRG MED/CURRENT MED MERGE: CPT

## 2022-12-30 NOTE — TELEPHONE ENCOUNTER
Pt discharged 12/29/22. Pt does not have HFU appt scheduled at this time. TCM/HFU appt recommended by 01/12/23 as pt is a moderate risk for readmission. Please discuss with PCP and contact pt accordingly. Thank you! NCM attempted to schedule TCM/HFU, however no availability within timeframe recommended. Please discuss with PCP and advise. Thank you.          BOOK BY DATE (last date for TCM): 01/12/23

## 2023-01-01 RX ORDER — LINAGLIPTIN 5 MG/1
5 TABLET, FILM COATED ORAL DAILY
Qty: 30 TABLET | Refills: 2 | Status: ON HOLD | OUTPATIENT
Start: 2023-01-01

## 2023-01-02 ENCOUNTER — HOSPITAL ENCOUNTER (OUTPATIENT)
Facility: HOSPITAL | Age: 78
Setting detail: OBSERVATION
Discharge: HOME | End: 2023-01-03
Attending: EMERGENCY MEDICINE | Admitting: INTERNAL MEDICINE
Payer: MEDICARE

## 2023-01-02 ENCOUNTER — APPOINTMENT (OUTPATIENT)
Dept: GENERAL RADIOLOGY | Facility: HOSPITAL | Age: 78
End: 2023-01-02
Attending: EMERGENCY MEDICINE
Payer: MEDICARE

## 2023-01-02 DIAGNOSIS — Z95.5 H/O HEART ARTERY STENT: ICD-10-CM

## 2023-01-02 DIAGNOSIS — R77.8 ELEVATED TROPONIN: ICD-10-CM

## 2023-01-02 DIAGNOSIS — R07.9 CHEST PAIN, CARDIAC: Primary | ICD-10-CM

## 2023-01-02 PROBLEM — I10 HYPERTENSION: Status: ACTIVE | Noted: 2021-07-15

## 2023-01-02 LAB
ANION GAP SERPL CALC-SCNC: 5 MMOL/L (ref 0–18)
ATRIAL RATE: 88 BPM
BASOPHILS # BLD AUTO: 0.03 X10(3) UL (ref 0–0.2)
BASOPHILS NFR BLD AUTO: 0.4 %
BUN BLD-MCNC: 15 MG/DL (ref 7–18)
BUN/CREAT SERPL: 17.4 (ref 10–20)
CALCIUM BLD-MCNC: 9.3 MG/DL (ref 8.5–10.1)
CHLORIDE SERPL-SCNC: 108 MMOL/L (ref 98–112)
CO2 SERPL-SCNC: 27 MMOL/L (ref 21–32)
CREAT BLD-MCNC: 0.86 MG/DL
DEPRECATED RDW RBC AUTO: 57.1 FL (ref 35.1–46.3)
EOSINOPHIL # BLD AUTO: 0.11 X10(3) UL (ref 0–0.7)
EOSINOPHIL NFR BLD AUTO: 1.6 %
ERYTHROCYTE [DISTWIDTH] IN BLOOD BY AUTOMATED COUNT: 13 % (ref 11–15)
GFR SERPLBLD BASED ON 1.73 SQ M-ARVRAT: 70 ML/MIN/1.73M2 (ref 60–?)
GLUCOSE BLD-MCNC: 125 MG/DL (ref 70–99)
GLUCOSE BLDC GLUCOMTR-MCNC: 102 MG/DL (ref 70–99)
GLUCOSE BLDC GLUCOMTR-MCNC: 157 MG/DL (ref 70–99)
GLUCOSE BLDC GLUCOMTR-MCNC: 159 MG/DL (ref 70–99)
GLUCOSE BLDC GLUCOMTR-MCNC: 170 MG/DL (ref 70–99)
GLUCOSE BLDC GLUCOMTR-MCNC: 95 MG/DL (ref 70–99)
HCT VFR BLD AUTO: 37.2 %
HGB BLD-MCNC: 12.9 G/DL
IMM GRANULOCYTES # BLD AUTO: 0.15 X10(3) UL (ref 0–1)
IMM GRANULOCYTES NFR BLD: 2.1 %
LYMPHOCYTES # BLD AUTO: 1.65 X10(3) UL (ref 1–4)
LYMPHOCYTES NFR BLD AUTO: 23.4 %
MCH RBC QN AUTO: 41.3 PG (ref 26–34)
MCHC RBC AUTO-ENTMCNC: 34.7 G/DL (ref 31–37)
MCV RBC AUTO: 119.2 FL
MONOCYTES # BLD AUTO: 0.62 X10(3) UL (ref 0.1–1)
MONOCYTES NFR BLD AUTO: 8.8 %
NEUTROPHILS # BLD AUTO: 4.5 X10 (3) UL (ref 1.5–7.7)
NEUTROPHILS # BLD AUTO: 4.5 X10(3) UL (ref 1.5–7.7)
NEUTROPHILS NFR BLD AUTO: 63.7 %
NT-PROBNP SERPL-MCNC: 32 PG/ML (ref ?–450)
OSMOLALITY SERPL CALC.SUM OF ELEC: 292 MOSM/KG (ref 275–295)
P AXIS: 47 DEGREES
P-R INTERVAL: 170 MS
PLATELET # BLD AUTO: 354 10(3)UL (ref 150–450)
POTASSIUM SERPL-SCNC: 4.2 MMOL/L (ref 3.5–5.1)
Q-T INTERVAL: 350 MS
QRS DURATION: 88 MS
QTC CALCULATION (BEZET): 423 MS
R AXIS: -24 DEGREES
RBC # BLD AUTO: 3.12 X10(6)UL
SARS-COV-2 RNA RESP QL NAA+PROBE: NOT DETECTED
SODIUM SERPL-SCNC: 140 MMOL/L (ref 136–145)
T AXIS: 61 DEGREES
TROPONIN I HIGH SENSITIVITY: 1239 NG/L
TROPONIN I HIGH SENSITIVITY: 1328 NG/L
VENTRICULAR RATE: 88 BPM
WBC # BLD AUTO: 7.1 X10(3) UL (ref 4–11)

## 2023-01-02 PROCEDURE — 71045 X-RAY EXAM CHEST 1 VIEW: CPT | Performed by: EMERGENCY MEDICINE

## 2023-01-02 PROCEDURE — 99222 1ST HOSP IP/OBS MODERATE 55: CPT | Performed by: INTERNAL MEDICINE

## 2023-01-02 RX ORDER — ASPIRIN 81 MG/1
81 TABLET, CHEWABLE ORAL DAILY
Status: DISCONTINUED | OUTPATIENT
Start: 2023-01-02 | End: 2023-01-03

## 2023-01-02 RX ORDER — ROSUVASTATIN CALCIUM 5 MG/1
5 TABLET, COATED ORAL NIGHTLY
Status: DISCONTINUED | OUTPATIENT
Start: 2023-01-02 | End: 2023-01-03

## 2023-01-02 RX ORDER — CARVEDILOL 3.12 MG/1
3.12 TABLET ORAL 2 TIMES DAILY WITH MEALS
Status: DISCONTINUED | OUTPATIENT
Start: 2023-01-02 | End: 2023-01-03

## 2023-01-02 RX ORDER — PANTOPRAZOLE SODIUM 40 MG/1
40 TABLET, DELAYED RELEASE ORAL
Status: DISCONTINUED | OUTPATIENT
Start: 2023-01-02 | End: 2023-01-03

## 2023-01-02 RX ORDER — LOSARTAN POTASSIUM 25 MG/1
25 TABLET ORAL DAILY
Status: DISCONTINUED | OUTPATIENT
Start: 2023-01-02 | End: 2023-01-03

## 2023-01-02 RX ORDER — NICOTINE POLACRILEX 4 MG
15 LOZENGE BUCCAL
Status: DISCONTINUED | OUTPATIENT
Start: 2023-01-02 | End: 2023-01-03

## 2023-01-02 RX ORDER — CLOPIDOGREL BISULFATE 75 MG/1
75 TABLET ORAL DAILY
Status: DISCONTINUED | OUTPATIENT
Start: 2023-01-02 | End: 2023-01-03

## 2023-01-02 RX ORDER — DEXTROSE MONOHYDRATE 25 G/50ML
50 INJECTION, SOLUTION INTRAVENOUS
Status: DISCONTINUED | OUTPATIENT
Start: 2023-01-02 | End: 2023-01-03

## 2023-01-02 RX ORDER — NICOTINE POLACRILEX 4 MG
30 LOZENGE BUCCAL
Status: DISCONTINUED | OUTPATIENT
Start: 2023-01-02 | End: 2023-01-03

## 2023-01-02 RX ORDER — HEPARIN SODIUM 5000 [USP'U]/ML
5000 INJECTION, SOLUTION INTRAVENOUS; SUBCUTANEOUS EVERY 8 HOURS SCHEDULED
Status: DISCONTINUED | OUTPATIENT
Start: 2023-01-02 | End: 2023-01-03

## 2023-01-02 NOTE — ED INITIAL ASSESSMENT (HPI)
Pt arrives from home for c/o SOB, dizziness and 1 episode of vomiting that started today. Pt reports she was discharged last week after having a heart stent placed.

## 2023-01-02 NOTE — PLAN OF CARE
Problem: Patient Centered Care  Goal: Patient preferences are identified and integrated in the patient's plan of care  Description: Interventions:  - What would you like us to know as we care for you?   - Provide timely, complete, and accurate information to patient/family  - Incorporate patient and family knowledge, values, beliefs, and cultural backgrounds into the planning and delivery of care  - Encourage patient/family to participate in care and decision-making at the level they choose  - Honor patient and family perspectives and choices  1/2/2023 1518 by Christine Mccollum RN  Outcome: Progressing  1/2/2023 1517 by Christine Mccollum RN  Outcome: Progressing     Problem: Patient/Family Goals  Goal: Patient/Family Long Term Goal  Description: Patient's Long Term Goal: Discharge home safely    Interventions:  - Medications  -Vitals  -Labs  - See additional Care Plan goals for specific interventions  1/2/2023 1518 by Christine Mccollum RN  Outcome: Progressing  1/2/2023 1517 by Christine Mccollum RN  Outcome: Progressing  Goal: Patient/Family Short Term Goal  Description: Patient's Short Term Goal: Improve strength and stability    Interventions:   - Ambulate TID  - See additional Care Plan goals for specific interventions  1/2/2023 1518 by Christine Mccollum RN  Outcome: Progressing  1/2/2023 1517 by Christine Mccollum RN  Outcome: Progressing     Problem: CARDIOVASCULAR - ADULT  Goal: Maintains optimal cardiac output and hemodynamic stability  Description: INTERVENTIONS:  - Monitor vital signs, rhythm, and trends  - Monitor for bleeding, hypotension and signs of decreased cardiac output  - Evaluate effectiveness of vasoactive medications to optimize hemodynamic stability  - Monitor arterial and/or venous puncture sites for bleeding and/or hematoma  - Assess quality of pulses, skin color and temperature  - Assess for signs of decreased coronary artery perfusion - ex.  Angina  - Evaluate fluid balance, assess for edema, trend weights  Outcome: Progressing  Goal: Absence of cardiac arrhythmias or at baseline  Description: INTERVENTIONS:  - Continuous cardiac monitoring, monitor vital signs, obtain 12 lead EKG if indicated  - Evaluate effectiveness of antiarrhythmic and heart rate control medications as ordered  - Initiate emergency measures for life threatening arrhythmias  - Monitor electrolytes and administer replacement therapy as ordered  Outcome: Progressing     Problem: DISCHARGE PLANNING  Goal: Discharge to home or other facility with appropriate resources  Description: INTERVENTIONS:  - Identify barriers to discharge w/pt and caregiver  - Include patient/family/discharge partner in discharge planning  - Arrange for needed discharge resources and transportation as appropriate  - Identify discharge learning needs (meds, wound care, etc)  - Arrange for interpreters to assist at discharge as needed  - Consider post-discharge preferences of patient/family/discharge partner  - Complete POLST form as appropriate  - Assess patient's ability to be responsible for managing their own health  - Refer to Case Management Department for coordinating discharge planning if the patient needs post-hospital services based on physician/LIP order or complex needs related to functional status, cognitive ability or social support system  Outcome: Progressing     Patient is alert and orientated x 4. Patient is on RA. No complaints of chest pain or shortness of breath at this time.  Plan for stress test in AM.

## 2023-01-02 NOTE — PLAN OF CARE
Pt is A&Ox4, RA. Denies SOB and chest pain at the moment. NPO until cardiology sees her. Med rec complete. Plan for to continue observation. Call light within reach and monitoring will continue.    Problem: Patient Centered Care  Goal: Patient preferences are identified and integrated in the patient's plan of care  Description: Interventions:  - What would you like us to know as we care for you?   - Provide timely, complete, and accurate information to patient/family  - Incorporate patient and family knowledge, values, beliefs, and cultural backgrounds into the planning and delivery of care  - Encourage patient/family to participate in care and decision-making at the level they choose  - Honor patient and family perspectives and choices  Outcome: Progressing     Problem: Patient/Family Goals  Goal: Patient/Family Long Term Goal  Description: Patient's Long Term Goal:     Interventions:  -   - See additional Care Plan goals for specific interventions  Outcome: Progressing  Goal: Patient/Family Short Term Goal  Description: Patient's Short Term Goal:     Interventions:   -   - See additional Care Plan goals for specific interventions  Outcome: Progressing

## 2023-01-02 NOTE — PROGRESS NOTES
Nurse contacted [facility] APN on call for specific stress test order for consent. APN informed nurse to fill out tredmill stress test with contrast for consent form.

## 2023-01-02 NOTE — ED QUICK NOTES
Orders for admission, patient is aware of plan and ready to go upstairs. Any questions, please call ED RN Lang Aragon at extension [phone].      Patient Covid vaccination status: Fully vaccinated     COVID Test Ordered in ED: Rapid SARS-CoV-2 by PCR    COVID Suspicion at Admission: N/A    Running Infusions:  None    Mental Status/LOC at time of transport: a/ox4    Other pertinent information:   CIWA score: N/A   NIH score:  N/A

## 2023-01-03 ENCOUNTER — APPOINTMENT (OUTPATIENT)
Dept: CV DIAGNOSTICS | Facility: HOSPITAL | Age: 78
End: 2023-01-03
Attending: INTERNAL MEDICINE
Payer: MEDICARE

## 2023-01-03 VITALS
DIASTOLIC BLOOD PRESSURE: 73 MMHG | HEIGHT: 63.5 IN | TEMPERATURE: 98 F | HEART RATE: 96 BPM | BODY MASS INDEX: 33.13 KG/M2 | WEIGHT: 189.31 LBS | OXYGEN SATURATION: 98 % | RESPIRATION RATE: 18 BRPM | SYSTOLIC BLOOD PRESSURE: 147 MMHG

## 2023-01-03 LAB
% OF MAX PREDICTED HR: 100 %
CREAT UR-SCNC: 52.7 MG/DL
GLUCOSE BLDC GLUCOMTR-MCNC: 110 MG/DL (ref 70–99)
GLUCOSE BLDC GLUCOMTR-MCNC: 125 MG/DL (ref 70–99)
MAX DIASTOLIC BP: 90 MMHG
MAX HEART RATE: 144 BPM
MAX PREDICTED HEART RATE: 144 BPM
MAX SYSTOLIC BP: 180 MMHG
MAX WORK LOAD: 70
MICROALBUMIN UR-MCNC: 0.83 MG/DL
MICROALBUMIN/CREAT 24H UR-RTO: 15.7 UG/MG (ref ?–30)

## 2023-01-03 PROCEDURE — 93017 CV STRESS TEST TRACING ONLY: CPT | Performed by: INTERNAL MEDICINE

## 2023-01-03 PROCEDURE — 93350 STRESS TTE ONLY: CPT | Performed by: INTERNAL MEDICINE

## 2023-01-03 PROCEDURE — 99239 HOSP IP/OBS DSCHRG MGMT >30: CPT | Performed by: INTERNAL MEDICINE

## 2023-01-03 RX ORDER — CLOPIDOGREL BISULFATE 75 MG/1
75 TABLET ORAL DAILY
Qty: 30 TABLET | Refills: 2 | Status: SHIPPED | OUTPATIENT
Start: 2023-01-04

## 2023-01-03 RX ORDER — CLOPIDOGREL BISULFATE 75 MG/1
450 TABLET ORAL ONCE
Status: COMPLETED | OUTPATIENT
Start: 2023-01-03 | End: 2023-01-03

## 2023-01-03 NOTE — PLAN OF CARE
Problem: Patient Centered Care  Goal: Patient preferences are identified and integrated in the patient's plan of care  Description: Interventions:  - What would you like us to know as we care for you? I live a home with my family  - Provide timely, complete, and accurate information to patient/family  - Incorporate patient and family knowledge, values, beliefs, and cultural backgrounds into the planning and delivery of care  - Encourage patient/family to participate in care and decision-making at the level they choose  - Honor patient and family perspectives and choices  Outcome: Progressing     Problem: Patient/Family Goals  Goal: Patient/Family Long Term Goal  Description: Patient's Long Term Goal: Go back home    Interventions:  - EKG   -Chest x ray  -Cardiac stress test   -Report SOB or chest pain  - See additional Care Plan goals for specific interventions  Outcome: Progressing  Goal: Patient/Family Short Term Goal  Description: Patient's Short Term Goal: Remain free of SOB and chest pain    Interventions:   -Identify triggers of chest pain   -Report symptoms to the RN immediately   -EKG   -Remote telemetry   - See additional Care Plan goals for specific interventions  Outcome: Progressing     Problem: CARDIOVASCULAR - ADULT  Goal: Maintains optimal cardiac output and hemodynamic stability  Description: INTERVENTIONS:  - Monitor vital signs, rhythm, and trends  - Monitor for bleeding, hypotension and signs of decreased cardiac output  - Evaluate effectiveness of vasoactive medications to optimize hemodynamic stability  - Monitor arterial and/or venous puncture sites for bleeding and/or hematoma  - Assess quality of pulses, skin color and temperature  - Assess for signs of decreased coronary artery perfusion - ex.  Angina  - Evaluate fluid balance, assess for edema, trend weights  Outcome: Progressing  Goal: Absence of cardiac arrhythmias or at baseline  Description: INTERVENTIONS:  - Continuous cardiac monitoring, monitor vital signs, obtain 12 lead EKG if indicated  - Evaluate effectiveness of antiarrhythmic and heart rate control medications as ordered  - Initiate emergency measures for life threatening arrhythmias  - Monitor electrolytes and administer replacement therapy as ordered  Outcome: Progressing     Problem: DISCHARGE PLANNING  Goal: Discharge to home or other facility with appropriate resources  Description: INTERVENTIONS:  - Identify barriers to discharge w/pt and caregiver  - Include patient/family/discharge partner in discharge planning  - Arrange for needed discharge resources and transportation as appropriate  - Identify discharge learning needs (meds, wound care, etc)  - Arrange for interpreters to assist at discharge as needed  - Consider post-discharge preferences of patient/family/discharge partner  - Complete POLST form as appropriate  - Assess patient's ability to be responsible for managing their own health  - Refer to Case Management Department for coordinating discharge planning if the patient needs post-hospital services based on physician/LIP order or complex needs related to functional status, cognitive ability or social support system  Outcome: Progressing     Patient is alert and orientated x 4. Patient is on RA. Patient had stress test completed today.

## 2023-01-03 NOTE — PLAN OF CARE
A&Ox4, RA. No reports of chest pain or SOB. Urine sample obtained, see results. Plan is for a stress test today, consent is signed and in the chart. Problem: Patient Centered Care  Goal: Patient preferences are identified and integrated in the patient's plan of care  Description: Interventions:  - What would you like us to know as we care for you? I live a home with my family  - Provide timely, complete, and accurate information to patient/family  - Incorporate patient and family knowledge, values, beliefs, and cultural backgrounds into the planning and delivery of care  - Encourage patient/family to participate in care and decision-making at the level they choose  - Honor patient and family perspectives and choices  Outcome: Progressing     Problem: Patient/Family Goals  Goal: Patient/Family Long Term Goal  Description: Patient's Long Term Goal: Go back home    Interventions:  - EKG   -Chest x ray  -Cardiac stress test   -Report SOB or chest pain  - See additional Care Plan goals for specific interventions  Outcome: Progressing  Goal: Patient/Family Short Term Goal  Description: Patient's Short Term Goal: Remain free of SOB and chest pain    Interventions:   -Identify triggers of chest pain   -Report symptoms to the RN immediately   -EKG   -Remote telemetry   - See additional Care Plan goals for specific interventions  Outcome: Progressing     Problem: CARDIOVASCULAR - ADULT  Goal: Maintains optimal cardiac output and hemodynamic stability  Description: INTERVENTIONS:  - Monitor vital signs, rhythm, and trends  - Monitor for bleeding, hypotension and signs of decreased cardiac output  - Evaluate effectiveness of vasoactive medications to optimize hemodynamic stability  - Monitor arterial and/or venous puncture sites for bleeding and/or hematoma  - Assess quality of pulses, skin color and temperature  - Assess for signs of decreased coronary artery perfusion - ex.  Angina  - Evaluate fluid balance, assess for edema, trend weights  Outcome: Progressing  Goal: Absence of cardiac arrhythmias or at baseline  Description: INTERVENTIONS:  - Continuous cardiac monitoring, monitor vital signs, obtain 12 lead EKG if indicated  - Evaluate effectiveness of antiarrhythmic and heart rate control medications as ordered  - Initiate emergency measures for life threatening arrhythmias  - Monitor electrolytes and administer replacement therapy as ordered  Outcome: Progressing     Problem: DISCHARGE PLANNING  Goal: Discharge to home or other facility with appropriate resources  Description: INTERVENTIONS:  - Identify barriers to discharge w/pt and caregiver  - Include patient/family/discharge partner in discharge planning  - Arrange for needed discharge resources and transportation as appropriate  - Identify discharge learning needs (meds, wound care, etc)  - Arrange for interpreters to assist at discharge as needed  - Consider post-discharge preferences of patient/family/discharge partner  - Complete POLST form as appropriate  - Assess patient's ability to be responsible for managing their own health  - Refer to Case Management Department for coordinating discharge planning if the patient needs post-hospital services based on physician/LIP order or complex needs related to functional status, cognitive ability or social support system  Outcome: Progressing

## 2023-01-04 ENCOUNTER — PATIENT OUTREACH (OUTPATIENT)
Dept: CASE MANAGEMENT | Age: 78
End: 2023-01-04

## 2023-01-04 DIAGNOSIS — Z02.9 ENCOUNTERS FOR UNSPECIFIED ADMINISTRATIVE PURPOSE: ICD-10-CM

## 2023-01-04 DIAGNOSIS — I20.9 ANGINA PECTORIS (HCC): ICD-10-CM

## 2023-01-04 PROCEDURE — 1111F DSCHRG MED/CURRENT MED MERGE: CPT

## 2023-01-04 NOTE — DISCHARGE PLANNING
Patient discharging home. Medications and follow up appointments reviewed with patient. Patient verbalized understanding. IV and tele removed. Patient is stable for discharge at this time.

## 2023-01-04 NOTE — PROGRESS NOTES
Attempted to contact pt for TCM however no answer. Call continued to ring and then disconnected. Call did not go to a  at this time. NCM to try again at a later time.

## 2023-01-05 RX ORDER — PANTOPRAZOLE SODIUM 40 MG/1
1 TABLET, DELAYED RELEASE ORAL DAILY
COMMUNITY
Start: 2023-01-03

## 2023-01-05 RX ORDER — CHOLECALCIFEROL (VITAMIN D3) 50 MCG
1 TABLET ORAL DAILY
COMMUNITY

## 2023-01-09 ENCOUNTER — OFFICE VISIT (OUTPATIENT)
Dept: INTERNAL MEDICINE CLINIC | Facility: CLINIC | Age: 78
End: 2023-01-09
Payer: MEDICARE

## 2023-01-09 VITALS
BODY MASS INDEX: 33.07 KG/M2 | DIASTOLIC BLOOD PRESSURE: 60 MMHG | HEART RATE: 84 BPM | WEIGHT: 189 LBS | RESPIRATION RATE: 14 BRPM | SYSTOLIC BLOOD PRESSURE: 128 MMHG | HEIGHT: 63.5 IN | OXYGEN SATURATION: 99 %

## 2023-01-09 DIAGNOSIS — I20.9 ANGINA PECTORIS (HCC): Primary | ICD-10-CM

## 2023-01-09 DIAGNOSIS — I10 HYPERTENSION, UNSPECIFIED TYPE: ICD-10-CM

## 2023-01-09 PROBLEM — R79.89 ELEVATED TROPONIN: Status: RESOLVED | Noted: 2022-12-27 | Resolved: 2023-01-09

## 2023-01-09 PROBLEM — R77.8 ELEVATED TROPONIN: Status: RESOLVED | Noted: 2022-12-27 | Resolved: 2023-01-09

## 2023-01-16 RX ORDER — GLIMEPIRIDE 1 MG/1
TABLET ORAL
Qty: 90 TABLET | Refills: 0 | Status: SHIPPED | OUTPATIENT
Start: 2023-01-16

## 2023-01-16 NOTE — TELEPHONE ENCOUNTER
LOV: 10/18/22    RTC: 4months    F/U: 03/20/23     Pending Monthly Supply: orders pending, please approve if appropriate. Not applicable

## 2023-01-25 ENCOUNTER — ORDER TRANSCRIPTION (OUTPATIENT)
Dept: CARDIAC REHAB | Facility: HOSPITAL | Age: 78
End: 2023-01-25

## 2023-01-25 DIAGNOSIS — Z95.820 S/P ANGIOPLASTY WITH STENT: Primary | ICD-10-CM

## 2023-01-25 RX ORDER — PANTOPRAZOLE SODIUM 40 MG/1
TABLET, DELAYED RELEASE ORAL
Qty: 30 TABLET | Refills: 0 | Status: SHIPPED | OUTPATIENT
Start: 2023-01-25

## 2023-01-25 NOTE — TELEPHONE ENCOUNTER
Patient reported medication, please advise on refill reques.t    Requested Prescriptions     Pending Prescriptions Disp Refills    PANTOPRAZOLE 40 MG Oral Tab EC [Pharmacy Med Name: PANTOPRAZOLE SOD DR 40 MG TAB] 30 tablet 0     Sig: TAKE 1 TABLET BY MOUTH EVERY DAY      Recent Visits  Date Type Provider Dept   01/09/23 Office Visit Anastasiia Mata MD [facility]   11/15/22 Office Visit Anastasiia Mata MD [facility]   07/12/22 Office Visit Anastasiia Mata MD [facility]   04/05/22 Office Visit Anastasiia Mata MD [facility]   01/04/22 Office Visit Anastasiia Mata MD [facility]   Showing recent visits within past 540 days with a meds authorizing provider and meeting all other requirements  Future Appointments  Date Type Provider Dept   04/18/23 Appointment Anastasiia Mata MD [facility]   Showing future appointments within next 150 days with a meds authorizing provider and meeting all other requirements     Requested Prescriptions   Pending Prescriptions Disp Refills    PANTOPRAZOLE 40 MG Oral Tab EC [Pharmacy Med Name: PANTOPRAZOLE SOD DR 40 MG TAB] 30 tablet 0     Sig: TAKE 1 TABLET BY MOUTH EVERY DAY       Gastrointestional Medication Protocol Passed - 1/25/2023 10:30 AM        Passed - In person appointment or virtual visit in the past 12 mos or appointment in next 3 mos     Recent Outpatient Visits              2 weeks ago Angina pectoris [facility])    Miguel Resendez MD    Office Visit    [facility] Suhas Watson MD    Office Visit    2 months [facility]    [facility] H[facility]    Nurse Only    2 months ago Type 2 diabetes mellitus without complication, without long-term current use of insulin [facility])    Miguel Amin MD    Office Visit    3 months ago Type 2 diabetes mellitus with other specified complication, unspecified whether long term insulin use ([facility] [address]    [address], [address] [address], Sharon Higgins MD    Office Visit          Future Appointments         Provider Department Appt Notes    In 6 days [address] Cardiopulmonary Rehab Philbert Search Stent 12/28    In 3 weeks Mechele Goltz, MD [address], [place] full body, spot on her eyes    In 1 month Sandy Ng MD [address], [address] [facility] [place] 5 month fu     In 1 month Chantell-Viru 25 Hematology Oncology [id number]    In 1 month Earnestine Jose Eduardo [address] [place] [address] Hematology Oncology FOLLOW UP VISIT. CL  4m    In 2 months Oni Juares MD [address], [address] [facility] [place] physical                   Future Appointments         Provider Department Appt Notes    In 6 days [address] Cardiopulmonary Rehab Phil[facility] Stent 12/28    In 3 weeks Mechele Goltz, MD [address], [place] full body, spot on her eyes    In 1 month Sandy Ng MD [address], [address] [facility] 5 month fu     In 1 month [address] Hematology Oncology [id number]    In 1 month Earnestinemiladys Whitt [address] [place] [address] [facility] Hematology Oncology FOLLOW UP VISIT. CL  4m    In 2 months Oni Juares MD [address], [address] physical           Recent Outpatient Visits              2 weeks ago Angina pectoris [facility])    [facility], Severiano Alfaro MD    Office Visit    2 months ago Polycythemia vera [facility])    [facility] Hematology Oncology Earnestine Whitt MD    Office Visit    2 months [facility]    [facility] Hematology Oncology    Nurse Only    2 months ago Type 2 diabetes mellitus without complication, without long-term current use of insulin [facility])    Pardepe Meadows MD    Office Visit    3 months ago Type 2 diabetes mellitus with other specified complication, unspecified whether long term insulin use [facility])    Chase Dalton MD    Office Visit

## 2023-01-30 ENCOUNTER — TELEPHONE (OUTPATIENT)
Dept: INTERNAL MEDICINE CLINIC | Facility: CLINIC | Age: 78
End: 2023-01-30

## 2023-01-30 NOTE — TELEPHONE ENCOUNTER
Patient states she is having a dental deep clean but Dentist requesting clearance from Dr. Judi Wang first.     Please fax to [address] in [facility] IDR#[phone]

## 2023-01-30 NOTE — TELEPHONE ENCOUNTER
Spoke with patient who stated she doesn't know if she needs to get clear for the procedure by her PCP or if there is a Dental form the PCP has to sign. Patient will be calling the Dental place and will call us back

## 2023-01-31 ENCOUNTER — CARDPULM VISIT (OUTPATIENT)
Dept: CARDIAC REHAB | Facility: HOSPITAL | Age: 78
End: 2023-01-31
Attending: INTERNAL MEDICINE
Payer: MEDICARE

## 2023-02-03 ENCOUNTER — TELEPHONE (OUTPATIENT)
Facility: CLINIC | Age: 78
End: 2023-02-03

## 2023-02-06 ENCOUNTER — CARDPULM VISIT (OUTPATIENT)
Dept: CARDIAC REHAB | Facility: HOSPITAL | Age: 78
End: 2023-02-06
Attending: INTERNAL MEDICINE
Payer: MEDICARE

## 2023-02-06 PROCEDURE — 93798 PHYS/QHP OP CAR RHAB W/ECG: CPT

## 2023-02-08 ENCOUNTER — CARDPULM VISIT (OUTPATIENT)
Dept: CARDIAC REHAB | Facility: HOSPITAL | Age: 78
End: 2023-02-08
Attending: INTERNAL MEDICINE
Payer: MEDICARE

## 2023-02-08 PROCEDURE — 93798 PHYS/QHP OP CAR RHAB W/ECG: CPT

## 2023-02-09 ENCOUNTER — PATIENT MESSAGE (OUTPATIENT)
Facility: CLINIC | Age: 78
End: 2023-02-09

## 2023-02-09 DIAGNOSIS — D22.9 ATYPICAL MOLE: Primary | ICD-10-CM

## 2023-02-10 ENCOUNTER — CARDPULM VISIT (OUTPATIENT)
Dept: CARDIAC REHAB | Facility: HOSPITAL | Age: 78
End: 2023-02-10
Attending: INTERNAL MEDICINE
Payer: MEDICARE

## 2023-02-10 PROCEDURE — 93798 PHYS/QHP OP CAR RHAB W/ECG: CPT

## 2023-02-10 NOTE — TELEPHONE ENCOUNTER
Referral pended. appt with Dr. Homero Santana 2/20/23 reason for visit states: full body, spot on eyes.

## 2023-02-10 NOTE — TELEPHONE ENCOUNTER
Please add the diagnosis when you pend the referral.  It is extremely extremely helpful. Thank you for the consideration.     I have approved the referral.

## 2023-02-11 LAB
GLUCOSE BLDC GLUCOMTR-MCNC: 135 MG/DL (ref 70–99)
GLUCOSE BLDC GLUCOMTR-MCNC: 189 MG/DL (ref 70–99)

## 2023-02-13 ENCOUNTER — CARDPULM VISIT (OUTPATIENT)
Dept: CARDIAC REHAB | Facility: HOSPITAL | Age: 78
End: 2023-02-13
Attending: INTERNAL MEDICINE
Payer: MEDICARE

## 2023-02-13 PROCEDURE — 93798 PHYS/QHP OP CAR RHAB W/ECG: CPT

## 2023-02-15 ENCOUNTER — APPOINTMENT (OUTPATIENT)
Dept: CARDIAC REHAB | Facility: HOSPITAL | Age: 78
End: 2023-02-15
Attending: INTERNAL MEDICINE
Payer: MEDICARE

## 2023-02-17 ENCOUNTER — APPOINTMENT (OUTPATIENT)
Dept: CARDIAC REHAB | Facility: HOSPITAL | Age: 78
End: 2023-02-17
Attending: INTERNAL MEDICINE
Payer: MEDICARE

## 2023-02-20 ENCOUNTER — APPOINTMENT (OUTPATIENT)
Dept: CARDIAC REHAB | Facility: HOSPITAL | Age: 78
End: 2023-02-20
Attending: INTERNAL MEDICINE
Payer: MEDICARE

## 2023-02-20 ENCOUNTER — OFFICE VISIT (OUTPATIENT)
Dept: DERMATOLOGY CLINIC | Facility: CLINIC | Age: 78
End: 2023-02-20

## 2023-02-20 DIAGNOSIS — L81.9 HYPERPIGMENTATION: ICD-10-CM

## 2023-02-20 DIAGNOSIS — L82.1 SK (SEBORRHEIC KERATOSIS): ICD-10-CM

## 2023-02-20 DIAGNOSIS — D22.9 MULTIPLE NEVI: ICD-10-CM

## 2023-02-20 DIAGNOSIS — L72.0 EPIDERMAL CYST: Primary | ICD-10-CM

## 2023-02-20 LAB — GLUCOSE BLDC GLUCOMTR-MCNC: 206 MG/DL (ref 70–99)

## 2023-02-20 PROCEDURE — 93798 PHYS/QHP OP CAR RHAB W/ECG: CPT

## 2023-02-20 PROCEDURE — 1126F AMNT PAIN NOTED NONE PRSNT: CPT | Performed by: DERMATOLOGY

## 2023-02-20 PROCEDURE — 99203 OFFICE O/P NEW LOW 30 MIN: CPT | Performed by: DERMATOLOGY

## 2023-02-20 RX ORDER — TRIAMCINOLONE ACETONIDE 1 MG/G
CREAM TOPICAL 2 TIMES DAILY
Qty: 80 G | Refills: 2 | Status: SHIPPED | OUTPATIENT
Start: 2023-02-20 | End: 2024-02-20

## 2023-02-20 RX ORDER — ERYTHROMYCIN 5 MG/G
OINTMENT OPHTHALMIC
Qty: 0.5 G | Refills: 2 | Status: SHIPPED | OUTPATIENT
Start: 2023-02-20

## 2023-02-20 RX ORDER — HYDROQUINONE 40 MG/G
1 CREAM TOPICAL 2 TIMES DAILY
Qty: 30 G | Refills: 1 | Status: SHIPPED | OUTPATIENT
Start: 2023-02-20

## 2023-02-20 RX ORDER — LOSARTAN POTASSIUM 50 MG/1
50 TABLET ORAL DAILY
COMMUNITY
Start: 2023-02-15

## 2023-02-20 RX ORDER — AMLODIPINE BESYLATE 10 MG/1
10 TABLET ORAL DAILY
COMMUNITY
Start: 2023-02-10

## 2023-02-22 ENCOUNTER — APPOINTMENT (OUTPATIENT)
Dept: CARDIAC REHAB | Facility: HOSPITAL | Age: 78
End: 2023-02-22
Attending: INTERNAL MEDICINE
Payer: MEDICARE

## 2023-02-22 PROCEDURE — 93798 PHYS/QHP OP CAR RHAB W/ECG: CPT

## 2023-02-24 ENCOUNTER — CARDPULM VISIT (OUTPATIENT)
Dept: CARDIAC REHAB | Facility: HOSPITAL | Age: 78
End: 2023-02-24
Attending: INTERNAL MEDICINE
Payer: MEDICARE

## 2023-02-24 PROCEDURE — 93798 PHYS/QHP OP CAR RHAB W/ECG: CPT

## 2023-02-27 ENCOUNTER — APPOINTMENT (OUTPATIENT)
Dept: CARDIAC REHAB | Facility: HOSPITAL | Age: 78
End: 2023-02-27
Attending: INTERNAL MEDICINE
Payer: MEDICARE

## 2023-02-27 PROCEDURE — 93798 PHYS/QHP OP CAR RHAB W/ECG: CPT

## 2023-03-01 ENCOUNTER — APPOINTMENT (OUTPATIENT)
Dept: CARDIAC REHAB | Facility: HOSPITAL | Age: 78
End: 2023-03-01
Attending: INTERNAL MEDICINE
Payer: MEDICARE

## 2023-03-01 PROCEDURE — 93798 PHYS/QHP OP CAR RHAB W/ECG: CPT

## 2023-03-03 ENCOUNTER — APPOINTMENT (OUTPATIENT)
Dept: CARDIAC REHAB | Facility: HOSPITAL | Age: 78
End: 2023-03-03
Attending: INTERNAL MEDICINE
Payer: MEDICARE

## 2023-03-03 PROCEDURE — 93798 PHYS/QHP OP CAR RHAB W/ECG: CPT

## 2023-03-06 ENCOUNTER — APPOINTMENT (OUTPATIENT)
Dept: CARDIAC REHAB | Facility: HOSPITAL | Age: 78
End: 2023-03-06
Attending: INTERNAL MEDICINE
Payer: MEDICARE

## 2023-03-08 ENCOUNTER — CARDPULM VISIT (OUTPATIENT)
Dept: CARDIAC REHAB | Facility: HOSPITAL | Age: 78
End: 2023-03-08
Attending: INTERNAL MEDICINE
Payer: MEDICARE

## 2023-03-08 PROCEDURE — 93798 PHYS/QHP OP CAR RHAB W/ECG: CPT

## 2023-03-10 ENCOUNTER — APPOINTMENT (OUTPATIENT)
Dept: CARDIAC REHAB | Facility: HOSPITAL | Age: 78
End: 2023-03-10
Attending: INTERNAL MEDICINE
Payer: MEDICARE

## 2023-03-10 PROCEDURE — 93798 PHYS/QHP OP CAR RHAB W/ECG: CPT

## 2023-03-13 ENCOUNTER — APPOINTMENT (OUTPATIENT)
Dept: CARDIAC REHAB | Facility: HOSPITAL | Age: 78
End: 2023-03-13
Attending: INTERNAL MEDICINE
Payer: MEDICARE

## 2023-03-13 PROCEDURE — 93798 PHYS/QHP OP CAR RHAB W/ECG: CPT

## 2023-03-15 ENCOUNTER — APPOINTMENT (OUTPATIENT)
Dept: CARDIAC REHAB | Facility: HOSPITAL | Age: 78
End: 2023-03-15
Attending: INTERNAL MEDICINE
Payer: MEDICARE

## 2023-03-15 ENCOUNTER — TELEPHONE (OUTPATIENT)
Dept: ENDOCRINOLOGY CLINIC | Facility: CLINIC | Age: 78
End: 2023-03-15

## 2023-03-15 DIAGNOSIS — E11.69 TYPE 2 DIABETES MELLITUS WITH OTHER SPECIFIED COMPLICATION, UNSPECIFIED WHETHER LONG TERM INSULIN USE (HCC): Primary | ICD-10-CM

## 2023-03-17 ENCOUNTER — LAB ENCOUNTER (OUTPATIENT)
Dept: LAB | Facility: HOSPITAL | Age: 78
End: 2023-03-17
Attending: INTERNAL MEDICINE
Payer: MEDICARE

## 2023-03-17 ENCOUNTER — CARDPULM VISIT (OUTPATIENT)
Dept: CARDIAC REHAB | Facility: HOSPITAL | Age: 78
End: 2023-03-17
Attending: INTERNAL MEDICINE
Payer: MEDICARE

## 2023-03-17 DIAGNOSIS — E11.69 TYPE 2 DIABETES MELLITUS WITH OTHER SPECIFIED COMPLICATION, UNSPECIFIED WHETHER LONG TERM INSULIN USE (HCC): ICD-10-CM

## 2023-03-17 LAB
EST. AVERAGE GLUCOSE BLD GHB EST-MCNC: 128 MG/DL (ref 68–126)
HBA1C MFR BLD: 6.1 % (ref ?–5.7)

## 2023-03-17 PROCEDURE — 36415 COLL VENOUS BLD VENIPUNCTURE: CPT

## 2023-03-17 PROCEDURE — 93798 PHYS/QHP OP CAR RHAB W/ECG: CPT

## 2023-03-17 PROCEDURE — 83036 HEMOGLOBIN GLYCOSYLATED A1C: CPT

## 2023-03-20 ENCOUNTER — APPOINTMENT (OUTPATIENT)
Dept: CARDIAC REHAB | Facility: HOSPITAL | Age: 78
End: 2023-03-20
Attending: INTERNAL MEDICINE
Payer: MEDICARE

## 2023-03-20 ENCOUNTER — OFFICE VISIT (OUTPATIENT)
Dept: ENDOCRINOLOGY CLINIC | Facility: CLINIC | Age: 78
End: 2023-03-20

## 2023-03-20 VITALS
HEART RATE: 96 BPM | HEIGHT: 63.5 IN | DIASTOLIC BLOOD PRESSURE: 82 MMHG | SYSTOLIC BLOOD PRESSURE: 148 MMHG | BODY MASS INDEX: 35 KG/M2 | WEIGHT: 200 LBS

## 2023-03-20 DIAGNOSIS — E78.5 DYSLIPIDEMIA: ICD-10-CM

## 2023-03-20 DIAGNOSIS — E11.69 TYPE 2 DIABETES MELLITUS WITH OTHER SPECIFIED COMPLICATION, UNSPECIFIED WHETHER LONG TERM INSULIN USE (HCC): Primary | ICD-10-CM

## 2023-03-20 LAB
GLUCOSE BLOOD: 158
TEST STRIP LOT #: NORMAL NUMERIC

## 2023-03-20 PROCEDURE — 82947 ASSAY GLUCOSE BLOOD QUANT: CPT | Performed by: INTERNAL MEDICINE

## 2023-03-20 PROCEDURE — 3077F SYST BP >= 140 MM HG: CPT | Performed by: INTERNAL MEDICINE

## 2023-03-20 PROCEDURE — 99213 OFFICE O/P EST LOW 20 MIN: CPT | Performed by: INTERNAL MEDICINE

## 2023-03-20 PROCEDURE — 3008F BODY MASS INDEX DOCD: CPT | Performed by: INTERNAL MEDICINE

## 2023-03-20 PROCEDURE — 3079F DIAST BP 80-89 MM HG: CPT | Performed by: INTERNAL MEDICINE

## 2023-03-20 RX ORDER — PERPHENAZINE 16 MG/1
TABLET, FILM COATED ORAL
Qty: 100 EACH | Refills: 0 | Status: SHIPPED | OUTPATIENT
Start: 2023-03-20

## 2023-03-21 ENCOUNTER — NURSE ONLY (OUTPATIENT)
Dept: HEMATOLOGY/ONCOLOGY | Facility: HOSPITAL | Age: 78
End: 2023-03-21
Attending: INTERNAL MEDICINE
Payer: MEDICARE

## 2023-03-21 VITALS
OXYGEN SATURATION: 98 % | SYSTOLIC BLOOD PRESSURE: 157 MMHG | TEMPERATURE: 98 F | RESPIRATION RATE: 16 BRPM | HEART RATE: 86 BPM | WEIGHT: 200 LBS | DIASTOLIC BLOOD PRESSURE: 80 MMHG | BODY MASS INDEX: 35 KG/M2 | HEIGHT: 63.5 IN

## 2023-03-21 DIAGNOSIS — D45 POLYCYTHEMIA VERA (HCC): Primary | ICD-10-CM

## 2023-03-21 DIAGNOSIS — D75.839 THROMBOCYTOSIS: ICD-10-CM

## 2023-03-21 DIAGNOSIS — D45 POLYCYTHEMIA VERA (HCC): ICD-10-CM

## 2023-03-21 LAB
ALBUMIN SERPL-MCNC: 3.4 G/DL (ref 3.4–5)
ALBUMIN/GLOB SERPL: 0.9 {RATIO} (ref 1–2)
ALP LIVER SERPL-CCNC: 78 U/L
ALT SERPL-CCNC: 22 U/L
ANION GAP SERPL CALC-SCNC: 5 MMOL/L (ref 0–18)
AST SERPL-CCNC: 17 U/L (ref 15–37)
BASOPHILS # BLD AUTO: 0.02 X10(3) UL (ref 0–0.2)
BASOPHILS NFR BLD AUTO: 0.4 %
BILIRUB SERPL-MCNC: 0.4 MG/DL (ref 0.1–2)
BUN BLD-MCNC: 15 MG/DL (ref 7–18)
BUN/CREAT SERPL: 16.7 (ref 10–20)
CALCIUM BLD-MCNC: 9.8 MG/DL (ref 8.5–10.1)
CHLORIDE SERPL-SCNC: 108 MMOL/L (ref 98–112)
CO2 SERPL-SCNC: 28 MMOL/L (ref 21–32)
CREAT BLD-MCNC: 0.9 MG/DL
DEPRECATED RDW RBC AUTO: 60.8 FL (ref 35.1–46.3)
EOSINOPHIL # BLD AUTO: 0.24 X10(3) UL (ref 0–0.7)
EOSINOPHIL NFR BLD AUTO: 5.2 %
ERYTHROCYTE [DISTWIDTH] IN BLOOD BY AUTOMATED COUNT: 13.3 % (ref 11–15)
FASTING STATUS PATIENT QL REPORTED: NO
GFR SERPLBLD BASED ON 1.73 SQ M-ARVRAT: 66 ML/MIN/1.73M2 (ref 60–?)
GLOBULIN PLAS-MCNC: 3.9 G/DL (ref 2.8–4.4)
GLUCOSE BLD-MCNC: 190 MG/DL (ref 70–99)
HCT VFR BLD AUTO: 37.5 %
HGB BLD-MCNC: 12.8 G/DL
IMM GRANULOCYTES # BLD AUTO: 0.16 X10(3) UL (ref 0–1)
IMM GRANULOCYTES NFR BLD: 3.4 %
LYMPHOCYTES # BLD AUTO: 0.97 X10(3) UL (ref 1–4)
LYMPHOCYTES NFR BLD AUTO: 20.9 %
MCH RBC QN AUTO: 42.1 PG (ref 26–34)
MCHC RBC AUTO-ENTMCNC: 34.1 G/DL (ref 31–37)
MCV RBC AUTO: 123.4 FL
MONOCYTES # BLD AUTO: 0.56 X10(3) UL (ref 0.1–1)
MONOCYTES NFR BLD AUTO: 12.1 %
NEUTROPHILS # BLD AUTO: 2.69 X10 (3) UL (ref 1.5–7.7)
NEUTROPHILS # BLD AUTO: 2.69 X10(3) UL (ref 1.5–7.7)
NEUTROPHILS NFR BLD AUTO: 58 %
OSMOLALITY SERPL CALC.SUM OF ELEC: 298 MOSM/KG (ref 275–295)
PLATELET # BLD AUTO: 275 10(3)UL (ref 150–450)
POTASSIUM SERPL-SCNC: 4 MMOL/L (ref 3.5–5.1)
PROT SERPL-MCNC: 7.3 G/DL (ref 6.4–8.2)
RBC # BLD AUTO: 3.04 X10(6)UL
SODIUM SERPL-SCNC: 141 MMOL/L (ref 136–145)
VIT B12 SERPL-MCNC: 333 PG/ML (ref 193–986)
WBC # BLD AUTO: 4.6 X10(3) UL (ref 4–11)

## 2023-03-21 PROCEDURE — 82607 VITAMIN B-12: CPT

## 2023-03-21 PROCEDURE — 80053 COMPREHEN METABOLIC PANEL: CPT

## 2023-03-21 PROCEDURE — 36415 COLL VENOUS BLD VENIPUNCTURE: CPT

## 2023-03-21 PROCEDURE — 85025 COMPLETE CBC W/AUTO DIFF WBC: CPT

## 2023-03-21 PROCEDURE — 99214 OFFICE O/P EST MOD 30 MIN: CPT | Performed by: INTERNAL MEDICINE

## 2023-03-22 ENCOUNTER — TELEPHONE (OUTPATIENT)
Dept: ENDOCRINOLOGY CLINIC | Facility: CLINIC | Age: 78
End: 2023-03-22

## 2023-03-22 ENCOUNTER — CARDPULM VISIT (OUTPATIENT)
Dept: CARDIAC REHAB | Facility: HOSPITAL | Age: 78
End: 2023-03-22
Attending: INTERNAL MEDICINE
Payer: MEDICARE

## 2023-03-22 PROCEDURE — 93798 PHYS/QHP OP CAR RHAB W/ECG: CPT

## 2023-03-22 RX ORDER — BLOOD-GLUCOSE METER
EACH MISCELLANEOUS
Qty: 1 KIT | Refills: 0 | Status: SHIPPED | OUTPATIENT
Start: 2023-03-22

## 2023-03-22 RX ORDER — BLOOD SUGAR DIAGNOSTIC
STRIP MISCELLANEOUS
Qty: 100 STRIP | Refills: 0 | Status: SHIPPED | OUTPATIENT
Start: 2023-03-22

## 2023-03-22 RX ORDER — LANCETS
EACH MISCELLANEOUS
Qty: 100 EACH | Refills: 1 | Status: SHIPPED | OUTPATIENT
Start: 2023-03-22 | End: 2023-03-24

## 2023-03-22 NOTE — TELEPHONE ENCOUNTER
[facility] pharmacy states medicine is not covered with insurance Glucose Blood (CONTOUR NEXT TEST) In Vitro Strip wondering if pt can be prescribed acchuchek and lancets , it will be covered please follow up

## 2023-03-22 NOTE — TELEPHONE ENCOUNTER
RN sent new scripts for accu check guide meter, strips and lancets. Pt made aware of change via [employer].

## 2023-03-24 ENCOUNTER — APPOINTMENT (OUTPATIENT)
Dept: CARDIAC REHAB | Facility: HOSPITAL | Age: 78
End: 2023-03-24
Attending: INTERNAL MEDICINE
Payer: MEDICARE

## 2023-03-24 ENCOUNTER — TELEPHONE (OUTPATIENT)
Dept: ENDOCRINOLOGY CLINIC | Facility: CLINIC | Age: 78
End: 2023-03-24

## 2023-03-24 RX ORDER — LANCETS
EACH MISCELLANEOUS
Qty: 100 EACH | Refills: 0 | Status: SHIPPED | OUTPATIENT
Start: 2023-03-24

## 2023-03-24 RX ORDER — LOSARTAN POTASSIUM 50 MG/1
TABLET ORAL
COMMUNITY

## 2023-03-27 ENCOUNTER — APPOINTMENT (OUTPATIENT)
Dept: CARDIAC REHAB | Facility: HOSPITAL | Age: 78
End: 2023-03-27
Attending: INTERNAL MEDICINE
Payer: MEDICARE

## 2023-03-27 PROCEDURE — 93798 PHYS/QHP OP CAR RHAB W/ECG: CPT

## 2023-03-27 RX ORDER — CARVEDILOL 3.12 MG/1
TABLET ORAL
Qty: 180 TABLET | Refills: 0 | Status: SHIPPED | OUTPATIENT
Start: 2023-03-27

## 2023-03-29 ENCOUNTER — CARDPULM VISIT (OUTPATIENT)
Dept: CARDIAC REHAB | Facility: HOSPITAL | Age: 78
End: 2023-03-29
Attending: INTERNAL MEDICINE
Payer: MEDICARE

## 2023-03-29 PROCEDURE — 93798 PHYS/QHP OP CAR RHAB W/ECG: CPT

## 2023-03-31 ENCOUNTER — CARDPULM VISIT (OUTPATIENT)
Dept: CARDIAC REHAB | Facility: HOSPITAL | Age: 78
End: 2023-03-31
Attending: INTERNAL MEDICINE
Payer: MEDICARE

## 2023-03-31 PROCEDURE — 93798 PHYS/QHP OP CAR RHAB W/ECG: CPT

## 2023-04-03 ENCOUNTER — APPOINTMENT (OUTPATIENT)
Dept: CARDIAC REHAB | Facility: HOSPITAL | Age: 78
End: 2023-04-03
Attending: INTERNAL MEDICINE
Payer: MEDICARE

## 2023-04-03 PROCEDURE — 93798 PHYS/QHP OP CAR RHAB W/ECG: CPT

## 2023-04-04 ENCOUNTER — TELEPHONE (OUTPATIENT)
Dept: CASE MANAGEMENT | Age: 78
End: 2023-04-04

## 2023-04-04 DIAGNOSIS — Z01.00 DIABETIC EYE EXAM (HCC): Primary | ICD-10-CM

## 2023-04-04 DIAGNOSIS — E11.9 DIABETIC EYE EXAM (HCC): Primary | ICD-10-CM

## 2023-04-04 NOTE — TELEPHONE ENCOUNTER
Dr. Beverley Hsieh,     Patient requesting referral to Dr. Anson Naranjo for a diabetic eye exam.     Pended referral please review diagnosis and sign off if you agree. Thank you.   Ender Rice

## 2023-04-05 ENCOUNTER — APPOINTMENT (OUTPATIENT)
Dept: CARDIAC REHAB | Facility: HOSPITAL | Age: 78
End: 2023-04-05
Attending: INTERNAL MEDICINE
Payer: MEDICARE

## 2023-04-05 PROCEDURE — 93798 PHYS/QHP OP CAR RHAB W/ECG: CPT

## 2023-04-07 ENCOUNTER — APPOINTMENT (OUTPATIENT)
Dept: CARDIAC REHAB | Facility: HOSPITAL | Age: 78
End: 2023-04-07
Attending: INTERNAL MEDICINE
Payer: MEDICARE

## 2023-04-07 PROCEDURE — 93798 PHYS/QHP OP CAR RHAB W/ECG: CPT

## 2023-04-10 ENCOUNTER — APPOINTMENT (OUTPATIENT)
Dept: CARDIAC REHAB | Facility: HOSPITAL | Age: 78
End: 2023-04-10
Attending: INTERNAL MEDICINE
Payer: MEDICARE

## 2023-04-10 PROCEDURE — 93798 PHYS/QHP OP CAR RHAB W/ECG: CPT

## 2023-04-12 ENCOUNTER — APPOINTMENT (OUTPATIENT)
Dept: CARDIAC REHAB | Facility: HOSPITAL | Age: 78
End: 2023-04-12
Attending: INTERNAL MEDICINE
Payer: MEDICARE

## 2023-04-12 PROCEDURE — 93798 PHYS/QHP OP CAR RHAB W/ECG: CPT

## 2023-04-14 ENCOUNTER — APPOINTMENT (OUTPATIENT)
Dept: CARDIAC REHAB | Facility: HOSPITAL | Age: 78
End: 2023-04-14
Attending: INTERNAL MEDICINE
Payer: MEDICARE

## 2023-04-17 ENCOUNTER — CARDPULM VISIT (OUTPATIENT)
Dept: CARDIAC REHAB | Facility: HOSPITAL | Age: 78
End: 2023-04-17
Attending: INTERNAL MEDICINE
Payer: MEDICARE

## 2023-04-17 PROCEDURE — 93798 PHYS/QHP OP CAR RHAB W/ECG: CPT

## 2023-04-18 ENCOUNTER — OFFICE VISIT (OUTPATIENT)
Facility: CLINIC | Age: 78
End: 2023-04-18

## 2023-04-18 VITALS
HEART RATE: 88 BPM | OXYGEN SATURATION: 99 % | WEIGHT: 201 LBS | RESPIRATION RATE: 14 BRPM | DIASTOLIC BLOOD PRESSURE: 86 MMHG | HEIGHT: 63.5 IN | BODY MASS INDEX: 35.17 KG/M2 | SYSTOLIC BLOOD PRESSURE: 134 MMHG

## 2023-04-18 DIAGNOSIS — E11.9 TYPE 2 DIABETES MELLITUS WITHOUT COMPLICATION, WITHOUT LONG-TERM CURRENT USE OF INSULIN (HCC): ICD-10-CM

## 2023-04-18 DIAGNOSIS — E66.01 CLASS 2 SEVERE OBESITY WITH SERIOUS COMORBIDITY AND BODY MASS INDEX (BMI) OF 35.0 TO 35.9 IN ADULT, UNSPECIFIED OBESITY TYPE (HCC): ICD-10-CM

## 2023-04-18 DIAGNOSIS — H61.23 BILATERAL IMPACTED CERUMEN: ICD-10-CM

## 2023-04-18 DIAGNOSIS — I10 HYPERTENSION, UNSPECIFIED TYPE: ICD-10-CM

## 2023-04-18 DIAGNOSIS — E78.5 DYSLIPIDEMIA: Primary | ICD-10-CM

## 2023-04-18 DIAGNOSIS — I25.118 CORONARY ARTERY DISEASE OF NATIVE ARTERY OF NATIVE HEART WITH STABLE ANGINA PECTORIS (HCC): ICD-10-CM

## 2023-04-18 DIAGNOSIS — D45 POLYCYTHEMIA VERA (HCC): ICD-10-CM

## 2023-04-18 PROCEDURE — 96160 PT-FOCUSED HLTH RISK ASSMT: CPT | Performed by: INTERNAL MEDICINE

## 2023-04-18 PROCEDURE — 3075F SYST BP GE 130 - 139MM HG: CPT | Performed by: INTERNAL MEDICINE

## 2023-04-18 PROCEDURE — 69210 REMOVE IMPACTED EAR WAX UNI: CPT | Performed by: INTERNAL MEDICINE

## 2023-04-18 PROCEDURE — 3079F DIAST BP 80-89 MM HG: CPT | Performed by: INTERNAL MEDICINE

## 2023-04-18 PROCEDURE — G0439 PPPS, SUBSEQ VISIT: HCPCS | Performed by: INTERNAL MEDICINE

## 2023-04-18 PROCEDURE — 3008F BODY MASS INDEX DOCD: CPT | Performed by: INTERNAL MEDICINE

## 2023-04-18 PROCEDURE — 1125F AMNT PAIN NOTED PAIN PRSNT: CPT | Performed by: INTERNAL MEDICINE

## 2023-04-18 PROCEDURE — 99213 OFFICE O/P EST LOW 20 MIN: CPT | Performed by: INTERNAL MEDICINE

## 2023-04-18 RX ORDER — MELATONIN
400 DAILY
COMMUNITY

## 2023-04-19 ENCOUNTER — CARDPULM VISIT (OUTPATIENT)
Dept: CARDIAC REHAB | Facility: HOSPITAL | Age: 78
End: 2023-04-19
Attending: INTERNAL MEDICINE
Payer: MEDICARE

## 2023-04-19 PROCEDURE — 93798 PHYS/QHP OP CAR RHAB W/ECG: CPT

## 2023-04-21 ENCOUNTER — APPOINTMENT (OUTPATIENT)
Dept: CARDIAC REHAB | Facility: HOSPITAL | Age: 78
End: 2023-04-21
Attending: INTERNAL MEDICINE
Payer: MEDICARE

## 2023-04-21 PROCEDURE — 93798 PHYS/QHP OP CAR RHAB W/ECG: CPT

## 2023-04-24 ENCOUNTER — CARDPULM VISIT (OUTPATIENT)
Dept: CARDIAC REHAB | Facility: HOSPITAL | Age: 78
End: 2023-04-24
Attending: INTERNAL MEDICINE
Payer: MEDICARE

## 2023-04-24 PROCEDURE — 93798 PHYS/QHP OP CAR RHAB W/ECG: CPT

## 2023-04-26 ENCOUNTER — APPOINTMENT (OUTPATIENT)
Dept: CARDIAC REHAB | Facility: HOSPITAL | Age: 78
End: 2023-04-26
Attending: INTERNAL MEDICINE
Payer: MEDICARE

## 2023-04-26 PROCEDURE — 93798 PHYS/QHP OP CAR RHAB W/ECG: CPT

## 2023-04-28 ENCOUNTER — APPOINTMENT (OUTPATIENT)
Dept: CARDIAC REHAB | Facility: HOSPITAL | Age: 78
End: 2023-04-28
Attending: INTERNAL MEDICINE
Payer: MEDICARE

## 2023-04-28 PROCEDURE — 93798 PHYS/QHP OP CAR RHAB W/ECG: CPT

## 2023-05-01 ENCOUNTER — APPOINTMENT (OUTPATIENT)
Dept: CARDIAC REHAB | Facility: HOSPITAL | Age: 78
End: 2023-05-01
Attending: INTERNAL MEDICINE
Payer: MEDICARE

## 2023-05-01 PROCEDURE — 93798 PHYS/QHP OP CAR RHAB W/ECG: CPT

## 2023-05-03 ENCOUNTER — CARDPULM VISIT (OUTPATIENT)
Dept: CARDIAC REHAB | Facility: HOSPITAL | Age: 78
End: 2023-05-03
Attending: INTERNAL MEDICINE
Payer: MEDICARE

## 2023-05-03 PROCEDURE — 93798 PHYS/QHP OP CAR RHAB W/ECG: CPT

## 2023-05-05 ENCOUNTER — APPOINTMENT (OUTPATIENT)
Dept: CARDIAC REHAB | Facility: HOSPITAL | Age: 78
End: 2023-05-05
Attending: INTERNAL MEDICINE
Payer: MEDICARE

## 2023-05-08 ENCOUNTER — APPOINTMENT (OUTPATIENT)
Dept: CARDIAC REHAB | Facility: HOSPITAL | Age: 78
End: 2023-05-08
Attending: INTERNAL MEDICINE
Payer: MEDICARE

## 2023-05-08 PROCEDURE — 93798 PHYS/QHP OP CAR RHAB W/ECG: CPT

## 2023-05-10 ENCOUNTER — APPOINTMENT (OUTPATIENT)
Dept: CARDIAC REHAB | Facility: HOSPITAL | Age: 78
End: 2023-05-10
Attending: INTERNAL MEDICINE
Payer: MEDICARE

## 2023-05-10 PROCEDURE — 93798 PHYS/QHP OP CAR RHAB W/ECG: CPT

## 2023-05-12 ENCOUNTER — CARDPULM VISIT (OUTPATIENT)
Dept: CARDIAC REHAB | Facility: HOSPITAL | Age: 78
End: 2023-05-12
Attending: INTERNAL MEDICINE
Payer: MEDICARE

## 2023-05-12 PROCEDURE — 93798 PHYS/QHP OP CAR RHAB W/ECG: CPT

## 2023-05-15 ENCOUNTER — APPOINTMENT (OUTPATIENT)
Dept: CARDIAC REHAB | Facility: HOSPITAL | Age: 78
End: 2023-05-15
Attending: INTERNAL MEDICINE
Payer: MEDICARE

## 2023-05-15 PROCEDURE — 93798 PHYS/QHP OP CAR RHAB W/ECG: CPT

## 2023-05-17 ENCOUNTER — APPOINTMENT (OUTPATIENT)
Dept: CARDIAC REHAB | Facility: HOSPITAL | Age: 78
End: 2023-05-17
Attending: INTERNAL MEDICINE
Payer: MEDICARE

## 2023-05-17 PROCEDURE — 93798 PHYS/QHP OP CAR RHAB W/ECG: CPT

## 2023-05-19 ENCOUNTER — APPOINTMENT (OUTPATIENT)
Dept: CARDIAC REHAB | Facility: HOSPITAL | Age: 78
End: 2023-05-19
Attending: INTERNAL MEDICINE
Payer: MEDICARE

## 2023-05-22 ENCOUNTER — APPOINTMENT (OUTPATIENT)
Dept: CARDIAC REHAB | Facility: HOSPITAL | Age: 78
End: 2023-05-22
Attending: INTERNAL MEDICINE
Payer: MEDICARE

## 2023-05-22 RX ORDER — HYDROXYUREA 500 MG/1
CAPSULE ORAL
Qty: 270 CAPSULE | Refills: 1 | Status: SHIPPED | OUTPATIENT
Start: 2023-05-22

## 2023-05-24 ENCOUNTER — APPOINTMENT (OUTPATIENT)
Dept: CARDIAC REHAB | Facility: HOSPITAL | Age: 78
End: 2023-05-24
Attending: INTERNAL MEDICINE
Payer: MEDICARE

## 2023-05-26 ENCOUNTER — APPOINTMENT (OUTPATIENT)
Dept: CARDIAC REHAB | Facility: HOSPITAL | Age: 78
End: 2023-05-26
Attending: INTERNAL MEDICINE
Payer: MEDICARE

## 2023-05-29 ENCOUNTER — APPOINTMENT (OUTPATIENT)
Dept: CARDIAC REHAB | Facility: HOSPITAL | Age: 78
End: 2023-05-29
Attending: INTERNAL MEDICINE
Payer: MEDICARE

## 2023-05-31 ENCOUNTER — APPOINTMENT (OUTPATIENT)
Dept: CARDIAC REHAB | Facility: HOSPITAL | Age: 78
End: 2023-05-31
Attending: INTERNAL MEDICINE
Payer: MEDICARE

## 2023-06-15 RX ORDER — CARVEDILOL 3.12 MG/1
3.12 TABLET ORAL 2 TIMES DAILY WITH MEALS
Qty: 180 TABLET | Refills: 3 | Status: SHIPPED | OUTPATIENT
Start: 2023-06-15

## 2023-06-16 ENCOUNTER — TELEPHONE (OUTPATIENT)
Dept: INTERNAL MEDICINE CLINIC | Facility: CLINIC | Age: 78
End: 2023-06-16

## 2023-06-16 NOTE — TELEPHONE ENCOUNTER
Patient states that she needs to get a order to get a 2nd mammogram done for 6  Month follow up for repeat test.

## 2023-06-16 NOTE — TELEPHONE ENCOUNTER
Patient states that she needs to get a referral for a repeat mammogram.    Patient is requesting referral.     Name of specialist and specialty department : [facility]   Reason for visit with the specialist: Repeat Mammogram   Address of the specialist office: [address] [place] [address]  [phone]  Appointment date: No         CSS informed patient the turnaround time for referral is 5-7 business days. Patient was informed to check their [employer] account for referral status.

## 2023-06-16 NOTE — TELEPHONE ENCOUNTER
Refill passed per [facility] protocol   Requested Prescriptions   Pending Prescriptions Disp Refills    CARVEDILOL 3.125 MG Oral Tab [Pharmacy Med Name: CARVEDILOL 3.125 MG TABLET] 180 tablet 0     Sig: TAKE 1 TABLET BY MOUTH TWICE A DAY WITH MEALS       Hypertensive Medications Protocol Passed - 6/15/2023 10:10 AM        Passed - In person appointment in the past 12 or next 3 months     Recent Outpatient Visits              1 month ago Dyslipidemia    Willow Holder MD    Office Visit    [facility]    [facility] H[facility] On[facility]    Nurse Only    2 months ago Polycythemia [facility])    [facility] Hematology Oncology Mirian Pak MD    Office Visit    2 months ago Type 2 diabetes mellitus with other specified complication, unspecified whether long term insulin use [facility])    Jayy Madrigal MD    Office Visit    3 months ago Epidermal cyst    Kurtis Hayes Rose Bud Adolph Jade MD    Office Visit          Future Appointments         Provider Department Appt Notes    In 1 week Casey 25 Hematology Oncology FT-CBC-CMP Vit [id number]    In 1 week Mame Salazar 19 Hematology Oncology FOLLOW UP VISIT. CL  4m    In 2 months Bassem Whitehead MD [facility] [address] 4 month f/u               Passed - Last BP reading less than 140/90     BP Readings from Last 1 Encounters:  04/18/23 : 134/86              Passed - CMP or BMP in past 6 months     Recent Results (from the past 4392 hour(s))   COMP METABOLIC PANEL (14)    Collection Time: 03/21/23 10:06 AM   Result Value Ref Range    Glucose 190 (H) 70 - 99 mg/dL    Sodium 141 136 - 145 mmol/L    Potassium 4.0 3.5 - 5.1 mmol/L    Chloride 108 98 - 112 mmol/L    CO2 28.0 21.0 - 32.0 mmol/L    Anion Gap 5 0 - 18 mmol/L    BUN 15 7 - 18 mg/dL    Creatinine 0.90 0.55 - 1.02 mg/dL    BUN/CREA Ratio 16.7 10.0 - 20.0    Calcium, Total 9.8 8.5 - 10.1 mg/dL    Calculated Osmolality 298 (H) 275 - 295 mOsm/kg    eGFR-Cr 66 >=60 mL/min/1.73m2    ALT 22 13 - 56 U/L    AST 17 15 - 37 U/L    Alkaline Phosphatase 78 55 - 142 U/L    Bilirubin, Total 0.4 0.1 - 2.0 mg/dL    Total Protein 7.3 6.4 - 8.2 g/dL    Albumin 3.4 3.4 - 5.0 g/dL    Globulin  3.9 2.8 - 4.4 g/dL    A/G Ratio 0.9 (L) 1.0 - 2.0    Patient Fasting for CMP? No      *Note: Due to a large number of results and/or encounters for the requested time period, some results have not been displayed. A complete set of results can be found in Results Review. Passed - In person appointment or virtual visit in the past 6 months     Recent Outpatient Visits              1 month ago Dyslipidemia    Seb Hawkins MD    Office Visit    2 [facility]    Nurse Only    2 months ago Polycythemia [facility])    [facility] Hematology Oncology Ivonne Lopez MD    Office Visit    2 months ago Type 2 diabetes mellitus with other specified complication, unspecified whether long term insulin use [facility])    Reid Robert MD    Office Visit    3 months ago Epidermal cyst    Bobbi Ho [place] Fidel Hayes MD    Office Visit          Future Appointments         Provider Department Appt Notes    In 1 week Chantell-Vireverardo 25 Hematology Oncology FT-CBC-CMP Vit [id number]    In 1 week Mame Hay 19 Hematology Oncology FOLLOW UP VISIT. CL  4m    In 2 months [facility] [place] [address] [place] [address] 4 month f/u               Passed - EGFRCR or GFRAA > 50     GFR Evaluation  EGFRCR: 66 , resulted on 3/21/2023

## 2023-06-26 ENCOUNTER — OFFICE VISIT (OUTPATIENT)
Dept: HEMATOLOGY/ONCOLOGY | Facility: HOSPITAL | Age: 78
End: 2023-06-26
Attending: INTERNAL MEDICINE
Payer: MEDICARE

## 2023-06-26 VITALS
HEART RATE: 79 BPM | HEIGHT: 63.5 IN | BODY MASS INDEX: 34.82 KG/M2 | TEMPERATURE: 99 F | WEIGHT: 199 LBS | DIASTOLIC BLOOD PRESSURE: 88 MMHG | OXYGEN SATURATION: 99 % | SYSTOLIC BLOOD PRESSURE: 169 MMHG | RESPIRATION RATE: 16 BRPM

## 2023-06-26 DIAGNOSIS — D45 POLYCYTHEMIA VERA (HCC): Primary | ICD-10-CM

## 2023-06-26 DIAGNOSIS — D45 POLYCYTHEMIA VERA (HCC): ICD-10-CM

## 2023-06-26 LAB
ALBUMIN SERPL-MCNC: 3.3 G/DL (ref 3.4–5)
ALBUMIN/GLOB SERPL: 0.9 {RATIO} (ref 1–2)
ALP LIVER SERPL-CCNC: 65 U/L
ALT SERPL-CCNC: 20 U/L
ANION GAP SERPL CALC-SCNC: 5 MMOL/L (ref 0–18)
AST SERPL-CCNC: 19 U/L (ref 15–37)
BASOPHILS # BLD AUTO: 0.04 X10(3) UL (ref 0–0.2)
BASOPHILS NFR BLD AUTO: 0.7 %
BILIRUB SERPL-MCNC: 0.5 MG/DL (ref 0.1–2)
BUN BLD-MCNC: 14 MG/DL (ref 7–18)
BUN/CREAT SERPL: 16.9 (ref 10–20)
CALCIUM BLD-MCNC: 8.9 MG/DL (ref 8.5–10.1)
CHLORIDE SERPL-SCNC: 110 MMOL/L (ref 98–112)
CO2 SERPL-SCNC: 26 MMOL/L (ref 21–32)
CREAT BLD-MCNC: 0.83 MG/DL
DEPRECATED HBV CORE AB SER IA-ACNC: 28.6 NG/ML
DEPRECATED RDW RBC AUTO: 57 FL (ref 35.1–46.3)
EOSINOPHIL # BLD AUTO: 0.09 X10(3) UL (ref 0–0.7)
EOSINOPHIL NFR BLD AUTO: 1.6 %
ERYTHROCYTE [DISTWIDTH] IN BLOOD BY AUTOMATED COUNT: 12.9 % (ref 11–15)
FASTING STATUS PATIENT QL REPORTED: NO
FOLATE SERPL-MCNC: >20 NG/ML (ref 8.7–?)
GFR SERPLBLD BASED ON 1.73 SQ M-ARVRAT: 73 ML/MIN/1.73M2 (ref 60–?)
GLOBULIN PLAS-MCNC: 3.8 G/DL (ref 2.8–4.4)
GLUCOSE BLD-MCNC: 135 MG/DL (ref 70–99)
HCT VFR BLD AUTO: 35.5 %
HGB BLD-MCNC: 12.3 G/DL
IMM GRANULOCYTES # BLD AUTO: 0.13 X10(3) UL (ref 0–1)
IMM GRANULOCYTES NFR BLD: 2.3 %
IRON SATN MFR SERPL: 27 %
IRON SERPL-MCNC: 87 UG/DL
LYMPHOCYTES # BLD AUTO: 1.67 X10(3) UL (ref 1–4)
LYMPHOCYTES NFR BLD AUTO: 29.6 %
MCH RBC QN AUTO: 41.7 PG (ref 26–34)
MCHC RBC AUTO-ENTMCNC: 34.6 G/DL (ref 31–37)
MCV RBC AUTO: 120.3 FL
MONOCYTES # BLD AUTO: 0.63 X10(3) UL (ref 0.1–1)
MONOCYTES NFR BLD AUTO: 11.2 %
NEUTROPHILS # BLD AUTO: 3.09 X10 (3) UL (ref 1.5–7.7)
NEUTROPHILS # BLD AUTO: 3.09 X10(3) UL (ref 1.5–7.7)
NEUTROPHILS NFR BLD AUTO: 54.6 %
OSMOLALITY SERPL CALC.SUM OF ELEC: 295 MOSM/KG (ref 275–295)
PLATELET # BLD AUTO: 286 10(3)UL (ref 150–450)
POTASSIUM SERPL-SCNC: 4 MMOL/L (ref 3.5–5.1)
PROT SERPL-MCNC: 7.1 G/DL (ref 6.4–8.2)
RBC # BLD AUTO: 2.95 X10(6)UL
SODIUM SERPL-SCNC: 141 MMOL/L (ref 136–145)
TIBC SERPL-MCNC: 320 UG/DL (ref 240–450)
TRANSFERRIN SERPL-MCNC: 215 MG/DL (ref 200–360)
VIT B12 SERPL-MCNC: 356 PG/ML (ref 193–986)
WBC # BLD AUTO: 5.7 X10(3) UL (ref 4–11)

## 2023-06-26 PROCEDURE — 84466 ASSAY OF TRANSFERRIN: CPT

## 2023-06-26 PROCEDURE — 36415 COLL VENOUS BLD VENIPUNCTURE: CPT

## 2023-06-26 PROCEDURE — 85025 COMPLETE CBC W/AUTO DIFF WBC: CPT

## 2023-06-26 PROCEDURE — 82728 ASSAY OF FERRITIN: CPT

## 2023-06-26 PROCEDURE — 80053 COMPREHEN METABOLIC PANEL: CPT

## 2023-06-26 PROCEDURE — 83540 ASSAY OF IRON: CPT

## 2023-06-26 PROCEDURE — 99214 OFFICE O/P EST MOD 30 MIN: CPT | Performed by: INTERNAL MEDICINE

## 2023-06-26 PROCEDURE — 82746 ASSAY OF FOLIC ACID SERUM: CPT

## 2023-06-26 PROCEDURE — 82607 VITAMIN B-12: CPT

## 2023-07-06 ENCOUNTER — HOSPITAL ENCOUNTER (OUTPATIENT)
Dept: MAMMOGRAPHY | Facility: HOSPITAL | Age: 78
Discharge: HOME | End: 2023-07-06
Attending: INTERNAL MEDICINE
Payer: MEDICARE

## 2023-07-06 DIAGNOSIS — R92.8 ABNORMAL MAMMOGRAM: ICD-10-CM

## 2023-07-06 PROCEDURE — 77061 BREAST TOMOSYNTHESIS UNI: CPT | Performed by: INTERNAL MEDICINE

## 2023-07-06 PROCEDURE — 77065 DX MAMMO INCL CAD UNI: CPT | Performed by: INTERNAL MEDICINE

## 2023-07-07 DIAGNOSIS — R92.8 ABNORMAL MAMMOGRAM: Primary | ICD-10-CM

## 2023-07-08 RX ORDER — BLOOD SUGAR DIAGNOSTIC
STRIP MISCELLANEOUS
Qty: 100 STRIP | Refills: 0 | Status: SHIPPED | OUTPATIENT
Start: 2023-07-08

## 2023-07-14 ENCOUNTER — TELEPHONE (OUTPATIENT)
Dept: ENDOCRINOLOGY CLINIC | Facility: CLINIC | Age: 78
End: 2023-07-14

## 2023-07-14 RX ORDER — LINAGLIPTIN 5 MG/1
5 TABLET, FILM COATED ORAL DAILY
Qty: 30 TABLET | Refills: 2 | Status: SHIPPED | OUTPATIENT
Start: 2023-07-14

## 2023-08-16 RX ORDER — LINAGLIPTIN 5 MG/1
5 TABLET, FILM COATED ORAL DAILY
Qty: 30 TABLET | Refills: 2 | Status: SHIPPED | OUTPATIENT
Start: 2023-08-16

## 2023-08-24 ENCOUNTER — TELEPHONE (OUTPATIENT)
Facility: CLINIC | Age: 78
End: 2023-08-24

## 2023-08-24 ENCOUNTER — OFFICE VISIT (OUTPATIENT)
Facility: CLINIC | Age: 78
End: 2023-08-24

## 2023-08-24 VITALS
HEIGHT: 63.5 IN | HEART RATE: 82 BPM | RESPIRATION RATE: 14 BRPM | WEIGHT: 194 LBS | DIASTOLIC BLOOD PRESSURE: 66 MMHG | OXYGEN SATURATION: 98 % | SYSTOLIC BLOOD PRESSURE: 130 MMHG | BODY MASS INDEX: 33.95 KG/M2

## 2023-08-24 DIAGNOSIS — I25.118 CORONARY ARTERY DISEASE OF NATIVE ARTERY OF NATIVE HEART WITH STABLE ANGINA PECTORIS (HCC): ICD-10-CM

## 2023-08-24 DIAGNOSIS — I10 HYPERTENSION, UNSPECIFIED TYPE: ICD-10-CM

## 2023-08-24 DIAGNOSIS — E11.9 TYPE 2 DIABETES MELLITUS WITHOUT COMPLICATION, WITHOUT LONG-TERM CURRENT USE OF INSULIN (HCC): Primary | ICD-10-CM

## 2023-08-24 DIAGNOSIS — E78.5 DYSLIPIDEMIA: ICD-10-CM

## 2023-08-24 PROCEDURE — 99214 OFFICE O/P EST MOD 30 MIN: CPT | Performed by: INTERNAL MEDICINE

## 2023-08-24 PROCEDURE — 3075F SYST BP GE 130 - 139MM HG: CPT | Performed by: INTERNAL MEDICINE

## 2023-08-24 PROCEDURE — 3078F DIAST BP <80 MM HG: CPT | Performed by: INTERNAL MEDICINE

## 2023-08-24 PROCEDURE — 3008F BODY MASS INDEX DOCD: CPT | Performed by: INTERNAL MEDICINE

## 2023-08-24 PROCEDURE — 1126F AMNT PAIN NOTED NONE PRSNT: CPT | Performed by: INTERNAL MEDICINE

## 2023-08-24 PROCEDURE — 1159F MED LIST DOCD IN RCRD: CPT | Performed by: INTERNAL MEDICINE

## 2023-08-24 RX ORDER — METOPROLOL SUCCINATE 50 MG/1
50 TABLET, EXTENDED RELEASE ORAL DAILY
COMMUNITY
Start: 2023-08-09

## 2023-08-24 NOTE — TELEPHONE ENCOUNTER
Spoke with patient at time of ov with PCP who stated she was seen by Sari Console for her Dilated Eye Exam on 4/5/2023. I have called their office and spoke with [facility]  Who will fax the report to the [facility]mob office. (Fax # PROVIDED).

## 2023-09-11 ENCOUNTER — OFFICE VISIT (OUTPATIENT)
Dept: ENDOCRINOLOGY CLINIC | Facility: CLINIC | Age: 78
End: 2023-09-11

## 2023-09-11 ENCOUNTER — LAB ENCOUNTER (OUTPATIENT)
Dept: LAB | Facility: HOSPITAL | Age: 78
End: 2023-09-11
Attending: INTERNAL MEDICINE
Payer: MEDICARE

## 2023-09-11 VITALS
SYSTOLIC BLOOD PRESSURE: 150 MMHG | BODY MASS INDEX: 35 KG/M2 | WEIGHT: 200 LBS | HEART RATE: 72 BPM | DIASTOLIC BLOOD PRESSURE: 80 MMHG

## 2023-09-11 DIAGNOSIS — E11.69 TYPE 2 DIABETES MELLITUS WITH OTHER SPECIFIED COMPLICATION, UNSPECIFIED WHETHER LONG TERM INSULIN USE (HCC): ICD-10-CM

## 2023-09-11 DIAGNOSIS — E78.5 DYSLIPIDEMIA: Primary | ICD-10-CM

## 2023-09-11 DIAGNOSIS — I10 HTN (HYPERTENSION): Primary | ICD-10-CM

## 2023-09-11 DIAGNOSIS — E78.5 DYSLIPIDEMIA: ICD-10-CM

## 2023-09-11 DIAGNOSIS — I25.10 CAD (CORONARY ARTERY DISEASE), NATIVE CORONARY ARTERY: ICD-10-CM

## 2023-09-11 DIAGNOSIS — I25.811 CAD (CORONARY ARTERY DISEASE), NATIVE ARTERY TRANSPLANTED HEART: ICD-10-CM

## 2023-09-11 LAB
ALBUMIN SERPL-MCNC: 3.5 G/DL (ref 3.4–5)
ALBUMIN/GLOB SERPL: 0.9 {RATIO} (ref 1–2)
ALP LIVER SERPL-CCNC: 80 U/L
ALT SERPL-CCNC: 25 U/L
ANION GAP SERPL CALC-SCNC: 5 MMOL/L (ref 0–18)
AST SERPL-CCNC: 25 U/L (ref 15–37)
BASOPHILS # BLD AUTO: 0.05 X10(3) UL (ref 0–0.2)
BASOPHILS NFR BLD AUTO: 0.8 %
BILIRUB SERPL-MCNC: 0.4 MG/DL (ref 0.1–2)
BUN BLD-MCNC: 17 MG/DL (ref 7–18)
BUN/CREAT SERPL: 24.3 (ref 10–20)
CALCIUM BLD-MCNC: 9.5 MG/DL (ref 8.5–10.1)
CARTRIDGE LOT#: ABNORMAL NUMERIC
CHLORIDE SERPL-SCNC: 108 MMOL/L (ref 98–112)
CO2 SERPL-SCNC: 27 MMOL/L (ref 21–32)
CREAT BLD-MCNC: 0.7 MG/DL
DEPRECATED RDW RBC AUTO: 55.8 FL (ref 35.1–46.3)
EGFRCR SERPLBLD CKD-EPI 2021: 89 ML/MIN/1.73M2 (ref 60–?)
EOSINOPHIL # BLD AUTO: 0.1 X10(3) UL (ref 0–0.7)
EOSINOPHIL NFR BLD AUTO: 1.6 %
ERYTHROCYTE [DISTWIDTH] IN BLOOD BY AUTOMATED COUNT: 12.6 % (ref 11–15)
FASTING STATUS PATIENT QL REPORTED: YES
GLOBULIN PLAS-MCNC: 3.8 G/DL (ref 2.8–4.4)
GLUCOSE BLD-MCNC: 133 MG/DL (ref 70–99)
GLUCOSE BLOOD: 123
HCT VFR BLD AUTO: 37.7 %
HEMOGLOBIN A1C: 6.7 % (ref 4.3–5.6)
HGB BLD-MCNC: 12.9 G/DL
IMM GRANULOCYTES # BLD AUTO: 0.08 X10(3) UL (ref 0–1)
IMM GRANULOCYTES NFR BLD: 1.2 %
LYMPHOCYTES # BLD AUTO: 1.78 X10(3) UL (ref 1–4)
LYMPHOCYTES NFR BLD AUTO: 27.6 %
MCH RBC QN AUTO: 41.1 PG (ref 26–34)
MCHC RBC AUTO-ENTMCNC: 34.2 G/DL (ref 31–37)
MCV RBC AUTO: 120.1 FL
MONOCYTES # BLD AUTO: 0.89 X10(3) UL (ref 0.1–1)
MONOCYTES NFR BLD AUTO: 13.8 %
NEUTROPHILS # BLD AUTO: 3.54 X10 (3) UL (ref 1.5–7.7)
NEUTROPHILS # BLD AUTO: 3.54 X10(3) UL (ref 1.5–7.7)
NEUTROPHILS NFR BLD AUTO: 55 %
NT-PROBNP SERPL-MCNC: 176 PG/ML (ref ?–450)
OSMOLALITY SERPL CALC.SUM OF ELEC: 293 MOSM/KG (ref 275–295)
PLATELET # BLD AUTO: 362 10(3)UL (ref 150–450)
POTASSIUM SERPL-SCNC: 4.1 MMOL/L (ref 3.5–5.1)
PROT SERPL-MCNC: 7.3 G/DL (ref 6.4–8.2)
RBC # BLD AUTO: 3.14 X10(6)UL
SODIUM SERPL-SCNC: 140 MMOL/L (ref 136–145)
TEST STRIP LOT #: NORMAL NUMERIC
WBC # BLD AUTO: 6.4 X10(3) UL (ref 4–11)

## 2023-09-11 PROCEDURE — 82947 ASSAY GLUCOSE BLOOD QUANT: CPT | Performed by: INTERNAL MEDICINE

## 2023-09-11 PROCEDURE — 3079F DIAST BP 80-89 MM HG: CPT | Performed by: INTERNAL MEDICINE

## 2023-09-11 PROCEDURE — 36415 COLL VENOUS BLD VENIPUNCTURE: CPT

## 2023-09-11 PROCEDURE — 83880 ASSAY OF NATRIURETIC PEPTIDE: CPT

## 2023-09-11 PROCEDURE — 83036 HEMOGLOBIN GLYCOSYLATED A1C: CPT | Performed by: INTERNAL MEDICINE

## 2023-09-11 PROCEDURE — 1160F RVW MEDS BY RX/DR IN RCRD: CPT | Performed by: INTERNAL MEDICINE

## 2023-09-11 PROCEDURE — 99213 OFFICE O/P EST LOW 20 MIN: CPT | Performed by: INTERNAL MEDICINE

## 2023-09-11 PROCEDURE — 3077F SYST BP >= 140 MM HG: CPT | Performed by: INTERNAL MEDICINE

## 2023-09-11 PROCEDURE — 85025 COMPLETE CBC W/AUTO DIFF WBC: CPT

## 2023-09-11 PROCEDURE — 1159F MED LIST DOCD IN RCRD: CPT | Performed by: INTERNAL MEDICINE

## 2023-09-11 PROCEDURE — 80053 COMPREHEN METABOLIC PANEL: CPT

## 2023-09-11 RX ORDER — LINAGLIPTIN 5 MG/1
5 TABLET, FILM COATED ORAL DAILY
Qty: 90 TABLET | Refills: 1 | Status: SHIPPED | OUTPATIENT
Start: 2023-09-11

## 2023-09-18 ENCOUNTER — MED REC SCAN ONLY (OUTPATIENT)
Dept: INTERNAL MEDICINE CLINIC | Facility: CLINIC | Age: 78
End: 2023-09-18

## 2023-10-05 NOTE — TELEPHONE ENCOUNTER
Refill passed per [employer] protocol. Requested Prescriptions   Pending Prescriptions Disp Refills    METFORMIN HCL 1000 MG Oral Tab [Pharmacy Med Name: METFORMIN HCL 1,000 MG TABLET] 180 tablet 2     Sig: TAKE 1 TABLET BY MOUTH TWICE A DAY       Diabetes Medication Protocol Passed - 10/5/2023  2:08 AM        Passed - Last A1C < 7.5 and within past 6 months     Lab Results   Component Value Date    A1C 6.7 (A) 09/11/2023             Passed - In person appointment or virtual visit in the past 6 mos or appointment in next 3 mos     Recent Outpatient Visits              3 weeks ago [facility], Parker Fitzgerald MD    Office Visit    1 month ago Type 2 diabetes mellitus without complication, without long-term current use of insulin ([address]    Willow Rodriguez MD    Office Visit    [facility] Justin Palacios MD    Office Visit    3 months ago [facility] Hematology Oncology    Nurse Only    5 months ago Willow Gallardo MD    Office Visit          Future Appointments         Provider Department Appt Notes    In 2 months Casey 25 Hematology Oncology     In 2 [facility] FOLLOW UP VISIT.     In 3 months [facility], [address] or GFRAA > 50     GFR Evaluation  EGFRCR: 89 , resulted on 9/11/2023          Passed - GFR in the past 12 months              Recent Outpatient Visits              3 weeks ago Dyslipidemia    Chely Lui MD    Office Visit    1 month ago Type 2 diabetes mellitus without complication, without long-term current use of insulin [facility])    Caitie Sood MD    Office Visit    3 months ago Polycythemia vera [facility])    [facility] Hematology Oncology Omid Belcher MD    Office Visit    3 months [facility]    [facility] Hemat[facility] On[facility]    Nurse Only    5 months ago Dyslipidemia    [address], [address] [facility] Navarro Lopez MD    Office Visit             Future Appointments         Provider Department Appt Notes    In 2 months Casey 25 Hematology Oncology     In 2 months [facility] Hematology Oncology FOLLOW UP VISIT.     In 3 months Bhavya Figueroa MD [address], [address] [facility]

## 2023-10-06 RX ORDER — BLOOD SUGAR DIAGNOSTIC
STRIP MISCELLANEOUS
Qty: 100 STRIP | Refills: 0 | Status: SHIPPED | OUTPATIENT
Start: 2023-10-06

## 2023-11-06 RX ORDER — METOPROLOL SUCCINATE 50 MG/1
50 TABLET, EXTENDED RELEASE ORAL DAILY
Qty: 90 TABLET | Refills: 1 | Status: SHIPPED | OUTPATIENT
Start: 2023-11-06 | End: 2024-05-04

## 2023-11-06 NOTE — TELEPHONE ENCOUNTER
Please review; protocol failed. Medication is listed as patient reported. Requested Prescriptions   Pending Prescriptions Disp Refills    metoprolol succinate ER 50 MG Oral Tablet 24 Hr  0     Sig: Take 1 tablet (50 mg total) by mouth daily. Hypertensive Medications Protocol Failed - 11/3/2023 12:42 PM        Failed - Last BP reading less than 140/90     BP Readings from Last 1 Encounters:  09/11/23 : 150/80              Passed - In person appointment in the past 12 or next 3 months     Recent Outpatient Visits              1 month ago Dyslipidemia    Eleanor Case MD    Office Visit    2 months ago Type 2 diabetes mellitus without complication, without long-term current use of insulin ([facility] [address]    Shar Ferraro MD    Office Visit    4 months ago [facility])    [facility] Hematology Oncology Shavon Parada MD    Office Visit    4 months ago [facility])    [facility] Hematology Oncology    Nurse Only    6 months ago Dyslipidemia    Shar Ferraro MD    Office Visit          Future Appointments         Provider Department Appt Notes    In 1 month [facility] Hematology Oncology     In 1 month [facility] Hematology Oncology FOLLOW UP VISIT.     In 2 months Robert Carlson MD [address]                       Passed - CMP or BMP in past 6 months     Recent Results (from the past 4392 hour(s))   Comp Metabolic Panel (14)    Collection Time: 09/11/23  8:35 AM   Result Value Ref Range    Glucose 133 (H) 70 - 99 mg/dL    Sodium 140 136 - 145 mmol/L    Potassium 4.1 3.5 - 5.1 mmol/L    Chloride 108 98 - 112 mmol/L    CO2 27.0 21.0 - 32.0 mmol/L    Anion Gap 5 0 - 18 mmol/L    BUN 17 7 - 18 mg/dL    Creatinine 0.70 0.55 - 1.02 mg/dL    BUN/CREA Ratio 24.3 (H) 10.0 - 20.0    Calcium, Total 9.5 8.5 - 10.1 mg/dL    Calculated Osmolality 293 275 - 295 mOsm/kg    eGFR-Cr 89 >=60 mL/min/1.73m2    ALT 25 13 - 56 U/L    AST 25 15 - 37 U/L    Alkaline Phosphatase 80 55 - 142 U/L    Bilirubin, Total 0.4 0.1 - 2.0 mg/dL    Total Protein 7.3 6.4 - 8.2 g/dL    Albumin 3.5 3.4 - 5.0 g/dL    Globulin  3.8 2.8 - 4.4 g/dL    A/G Ratio 0.9 (L) 1.0 - 2.0    Patient Fasting for CMP? Yes      *Note: Due to a large number of results and/or encounters for the requested time period, some results have not been displayed. A complete set of results can be found in Results Review. Passed - In person appointment or virtual visit in the past 6 months     Recent Outpatient Visits              1 month ago Dyslipidemia    Brad Payan MD    Office Visit    2 months ago Type 2 diabetes mellitus without complication, without long-term current use of insulin ([facility] [address]    Mitali Ramirez MD    Office Visit    [facility] H[facility] Clover Quezada MD    Office Visit    4 months [facility]    [facility] H[facility] On[facility]    Nurse Only    6 months ago Dyslipidemia    Mitali Ramirze MD    Office Visit          Future Appointments         Provider Department Appt Notes    In 1 month [facility] Hematology Oncology     In 1 month [facility] Hematology Oncology FOLLOW UP VISIT.     In 2 months Alexander Gonzalez MD [facility], [facility], [address] [place] [address] or GFRAA > 50     GFR Evaluation  EGFRCR: 89 , resulted on 9/11/2023             Recent Outpatient Visits              1 month ago Dyslipidemia Staci Goff MD    Office Visit    2 months ago Type 2 diabetes mellitus without complication, without long-term current use of insulin [facility])    [address], [address] [place] [address], Ric Quinonez MD    [facility] Visit    [facility]    [facility] H[facility] On[facility] Ary Renee MD    Office Visit    4 months [facility]    [facility] Hematology Oncology    Nurse Only    6 months ago Dyslipidemia    Jennifer Martinez, Ric Quinonez MD    Office Visit          Future Appointments         Provider Department Appt Notes    In 1 month [facility] Hematology Oncology     In 1 month [facility] Hematology Oncology FOLLOW UP VISIT.     In 2 months Talat Deshpande MD [address], [place] [address]

## 2023-11-23 ENCOUNTER — PATIENT MESSAGE (OUTPATIENT)
Facility: CLINIC | Age: 78
End: 2023-11-23

## 2023-11-24 NOTE — TELEPHONE ENCOUNTER
11/22/2023  1:28 PM Martha Grullon to [phone]         From: Sunshine Back  To: Esteban Nathalie  Sent: 11/23/2023  1:18 PM CST  Subject: Eye Referral    Lesa Cisneros  My Eye doctor didn't get the referral from you from 11/8/23,    I have an appointment on 11/29/23 at 2:45p.    with Dr. Josefa Burkitt.    Please advise    Junito File [phone]

## 2023-12-08 NOTE — PROGRESS NOTES
Cancer Center Progress Note    Patient Name: Sheila Goldberg   YOB: 1945   Medical Record Number: [id number]   Attending Physician: Artem Appiah M.D. Chief Complaint:  MPN, JAK2 + PV  hydrea monitoring    Oncology History:  69year old seen for leukocytosis and thrombocytosis in the setting of tongue swelling and allergic reaction. 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 3/2023: CAD s/p stent placement    12/11/23  Interim  here for follow up on labs, hydrea tolerating, labs good, recent dx of CAD s/p stent. Saw pcp and endo recently. Bp elevated today. Compliant w/ meds. Seeing primary next mo. Bp readings at home have been ~150/80s. PMH: DM, HTN  PSH: Prior D&C, cataract, right breast biopsy 2015  Family History: neg for cancer, sister with MDS  Social History: non smoker. Current Outpatient Medications:     metoprolol succinate ER 50 MG Oral Tablet 24 Hr, Take 1 tablet (50 mg total) by mouth daily. , Disp: 90 tablet, Rfl: 1    Glucose Blood (ACCU-CHEK GUIDE) In Vitro Strip, Check blood sugar once a day, Disp: 100 strip, Rfl: 0    metFORMIN HCl 1000 MG Oral Tab, Take 1 tablet (1,000 mg total) by mouth 2 (two) times daily. , Disp: 180 tablet, Rfl: 3    linaGLIPtin (TRADJENTA) 5 mg Oral Tab, Take 1 tablet (5 mg total) by mouth daily. , Disp: 90 tablet, Rfl: 1    rosuvastatin 5 MG Oral Tab, Take 1 tablet (5 mg total) by mouth nightly., Disp: 90 tablet, Rfl: 3    HYDROXYUREA 500 MG Oral Cap, TAKE 2 CAPSULES IN THE MORNING, AND 1 CAPSULE IN THE EVENING FOR A TOTAL OF 1500 MG DAILY. , Disp: 270 capsule, Rfl: 1    folic acid 297 MCG Oral Tab, Take 1 tablet (400 mcg total) by mouth daily. , Disp: , Rfl:     losartan 50 MG Oral Tab, Take 1 tablet (50 mg total) by mouth daily. , Disp: , Rfl:     Accu-Chek Softclix Lancets Does not apply Misc, Use to check blood sugars once daily, Disp: 100 each, Rfl: 0    Blood Glucose Monitoring Suppl (ACCU-CHEK GUIDE) w/Device Does not apply Kit, Check blood sugar once a day, Disp: 1 kit, Rfl: 0    Glucose Blood (CONTOUR NEXT TEST) In Vitro Strip, Check sugars once a day E 11.65 without insulin use (Patient not taking: Reported on 4/18/2023), Disp: 100 each, Rfl: 0    pantoprazole 40 MG Oral Tab EC, Take 1 tablet (40 mg total) by mouth daily. , Disp: 90 tablet, Rfl: 1    Hydroquinone 4 % External Cream, Apply 1 Application. topically 2 (two) times daily. Use bid as directed to hands, Disp: 30 g, Rfl: 1    triamcinolone 0.1 % External Cream, Apply topically 2 (two) times daily. To hands and feet, Disp: 80 g, Rfl: 2    erythromycin 5 MG/GM Ophthalmic Ointment, Use bid to cyst at corner of right eye, Disp: 0.5 g, Rfl: 2    Cholecalciferol (VITAMIN D) 50 MCG (2000 UT) Oral Tab, Take 1 tablet by mouth daily. , Disp: , Rfl:     clopidogrel 75 MG Oral Tab, Take 1 tablet (75 mg total) by mouth daily. , Disp: 30 tablet, Rfl: 2    aspirin 81 MG Oral Chew Tab, Chew 1 tablet (81 mg total) by mouth daily. , Disp: , Rfl:     Allergies: Allergies   Allergen Reactions    Lisinopril TONGUE SWELLING        Review of Systems:  Oncology specific ROS negative except as per HPI    Vital Signs: There were no vitals taken for this visit. Wt Readings from Last 6 Encounters:   09/11/23 90.7 kg (200 lb)   08/24/23 88 kg (194 lb)   06/26/23 90.3 kg (199 lb)   04/18/23 91.2 kg (201 lb)   03/21/23 90.7 kg (200 lb)   03/20/23 90.7 kg (200 lb)   General: Patient is alert and oriented x 3, not in acute distress. Neck: ROM intact. Chest: Symmetric expansion, nonlabored breathing  Extremities: No edema, cyanosis, or bruising  Neurological: motor strength grossly intact, MA4E  Skin: hyperpigmentation to palms of the hands. 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 Continue ASA. Continue folic acid with hydrea  - CBC at goal currently.  Macrocytosis due to hydrea, CTM  - return q 6 mo with labs with Dr. Linda Chen CAD s/p stent- per cardiology    HTN  Management her primary - fu earlier if bp readings elevated. MDM moderate. Lab review.  Ongoing issues    Radha Cullen PA-C

## 2023-12-11 ENCOUNTER — OFFICE VISIT (OUTPATIENT)
Dept: HEMATOLOGY/ONCOLOGY | Facility: HOSPITAL | Age: 78
End: 2023-12-11
Attending: INTERNAL MEDICINE
Payer: MEDICARE

## 2023-12-11 VITALS
WEIGHT: 197.81 LBS | BODY MASS INDEX: 34.62 KG/M2 | SYSTOLIC BLOOD PRESSURE: 171 MMHG | DIASTOLIC BLOOD PRESSURE: 84 MMHG | TEMPERATURE: 98 F | RESPIRATION RATE: 18 BRPM | OXYGEN SATURATION: 99 % | HEART RATE: 75 BPM | HEIGHT: 63.5 IN

## 2023-12-11 DIAGNOSIS — D45 POLYCYTHEMIA VERA (HCC): ICD-10-CM

## 2023-12-11 DIAGNOSIS — D75.839 THROMBOCYTOSIS: ICD-10-CM

## 2023-12-11 DIAGNOSIS — D45 POLYCYTHEMIA VERA (HCC): Primary | ICD-10-CM

## 2023-12-11 LAB
ALBUMIN SERPL-MCNC: 3.9 G/DL (ref 3.2–4.8)
ALBUMIN/GLOB SERPL: 1.3 {RATIO} (ref 1–2)
ALP LIVER SERPL-CCNC: 71 U/L
ALT SERPL-CCNC: 11 U/L
ANION GAP SERPL CALC-SCNC: 4 MMOL/L (ref 0–18)
AST SERPL-CCNC: 19 U/L (ref ?–34)
BASOPHILS # BLD AUTO: 0.04 X10(3) UL (ref 0–0.2)
BASOPHILS NFR BLD AUTO: 0.7 %
BILIRUB SERPL-MCNC: 0.5 MG/DL (ref 0.2–1.1)
BUN BLD-MCNC: 14 MG/DL (ref 9–23)
BUN/CREAT SERPL: 16.3 (ref 10–20)
CALCIUM BLD-MCNC: 9.6 MG/DL (ref 8.7–10.4)
CHLORIDE SERPL-SCNC: 107 MMOL/L (ref 98–112)
CO2 SERPL-SCNC: 29 MMOL/L (ref 21–32)
CREAT BLD-MCNC: 0.86 MG/DL
DEPRECATED HBV CORE AB SER IA-ACNC: 22.7 NG/ML
DEPRECATED RDW RBC AUTO: 64.7 FL (ref 35.1–46.3)
EGFRCR SERPLBLD CKD-EPI 2021: 70 ML/MIN/1.73M2 (ref 60–?)
EOSINOPHIL # BLD AUTO: 0.15 X10(3) UL (ref 0–0.7)
EOSINOPHIL NFR BLD AUTO: 2.8 %
ERYTHROCYTE [DISTWIDTH] IN BLOOD BY AUTOMATED COUNT: 14.5 % (ref 11–15)
FOLATE SERPL-MCNC: >24 NG/ML (ref 5.4–?)
GLOBULIN PLAS-MCNC: 3.1 G/DL (ref 2.8–4.4)
GLUCOSE BLD-MCNC: 115 MG/DL (ref 70–99)
HCT VFR BLD AUTO: 37.3 %
HGB BLD-MCNC: 12.8 G/DL
IMM GRANULOCYTES # BLD AUTO: 0.06 X10(3) UL (ref 0–1)
IMM GRANULOCYTES NFR BLD: 1.1 %
IRON SATN MFR SERPL: 23 %
IRON SERPL-MCNC: 73 UG/DL
LYMPHOCYTES # BLD AUTO: 1.42 X10(3) UL (ref 1–4)
LYMPHOCYTES NFR BLD AUTO: 26.1 %
MCH RBC QN AUTO: 41.3 PG (ref 26–34)
MCHC RBC AUTO-ENTMCNC: 34.3 G/DL (ref 31–37)
MCV RBC AUTO: 120.3 FL
MONOCYTES # BLD AUTO: 0.57 X10(3) UL (ref 0.1–1)
MONOCYTES NFR BLD AUTO: 10.5 %
NEUTROPHILS # BLD AUTO: 3.2 X10 (3) UL (ref 1.5–7.7)
NEUTROPHILS # BLD AUTO: 3.2 X10(3) UL (ref 1.5–7.7)
NEUTROPHILS NFR BLD AUTO: 58.8 %
OSMOLALITY SERPL CALC.SUM OF ELEC: 291 MOSM/KG (ref 275–295)
PLATELET # BLD AUTO: 338 10(3)UL (ref 150–450)
POTASSIUM SERPL-SCNC: 4.2 MMOL/L (ref 3.5–5.1)
PROT SERPL-MCNC: 7 G/DL (ref 5.7–8.2)
RBC # BLD AUTO: 3.1 X10(6)UL
SODIUM SERPL-SCNC: 140 MMOL/L (ref 136–145)
TIBC SERPL-MCNC: 313 UG/DL (ref 250–425)
TRANSFERRIN SERPL-MCNC: 210 MG/DL (ref 250–380)
VIT B12 SERPL-MCNC: 415 PG/ML (ref 211–911)
WBC # BLD AUTO: 5.4 X10(3) UL (ref 4–11)

## 2023-12-11 PROCEDURE — 82607 VITAMIN B-12: CPT

## 2023-12-11 PROCEDURE — 83540 ASSAY OF IRON: CPT

## 2023-12-11 PROCEDURE — 36415 COLL VENOUS BLD VENIPUNCTURE: CPT

## 2023-12-11 PROCEDURE — 82728 ASSAY OF FERRITIN: CPT

## 2023-12-11 PROCEDURE — 84466 ASSAY OF TRANSFERRIN: CPT

## 2023-12-11 PROCEDURE — 82746 ASSAY OF FOLIC ACID SERUM: CPT

## 2023-12-11 PROCEDURE — 99214 OFFICE O/P EST MOD 30 MIN: CPT | Performed by: PHYSICIAN ASSISTANT

## 2023-12-11 PROCEDURE — 80053 COMPREHEN METABOLIC PANEL: CPT

## 2023-12-11 PROCEDURE — 85025 COMPLETE CBC W/AUTO DIFF WBC: CPT

## 2023-12-19 ENCOUNTER — APPOINTMENT (OUTPATIENT)
Dept: HEMATOLOGY/ONCOLOGY | Facility: HOSPITAL | Age: 78
End: 2023-12-19
Attending: INTERNAL MEDICINE
Payer: MEDICARE

## 2024-01-08 ENCOUNTER — TELEPHONE (OUTPATIENT)
Dept: HEMATOLOGY/ONCOLOGY | Facility: HOSPITAL | Age: 79
End: 2024-01-08

## 2024-01-08 RX ORDER — HYDROXYUREA 500 MG/1
CAPSULE ORAL
Qty: 270 CAPSULE | Refills: 5 | Status: SHIPPED | OUTPATIENT
Start: 2024-01-08

## 2024-01-08 NOTE — TELEPHONE ENCOUNTER
Corrine [phone] needs a refill on the following medication: Could like a call when ordered. Thanks Ev

## 2024-01-18 ENCOUNTER — HOSPITAL ENCOUNTER (OUTPATIENT)
Dept: MAMMOGRAPHY | Facility: HOSPITAL | Age: 79
Discharge: HOME | End: 2024-01-18
Attending: INTERNAL MEDICINE
Payer: MEDICARE

## 2024-01-18 DIAGNOSIS — R92.8 ABNORMAL MAMMOGRAM: ICD-10-CM

## 2024-01-18 PROCEDURE — 77062 BREAST TOMOSYNTHESIS BI: CPT | Performed by: INTERNAL MEDICINE

## 2024-01-18 PROCEDURE — 77066 DX MAMMO INCL CAD BI: CPT | Performed by: INTERNAL MEDICINE

## 2024-01-23 ENCOUNTER — OFFICE VISIT (OUTPATIENT)
Facility: CLINIC | Age: 79
End: 2024-01-23
Payer: MEDICARE

## 2024-01-23 VITALS
HEART RATE: 89 BPM | BODY MASS INDEX: 34.47 KG/M2 | DIASTOLIC BLOOD PRESSURE: 62 MMHG | WEIGHT: 197 LBS | HEIGHT: 63.5 IN | OXYGEN SATURATION: 98 % | SYSTOLIC BLOOD PRESSURE: 130 MMHG

## 2024-01-23 DIAGNOSIS — E11.9 TYPE 2 DIABETES MELLITUS WITHOUT COMPLICATION, WITHOUT LONG-TERM CURRENT USE OF INSULIN (HCC): Primary | ICD-10-CM

## 2024-01-23 DIAGNOSIS — D45 POLYCYTHEMIA VERA (HCC): ICD-10-CM

## 2024-01-23 DIAGNOSIS — E78.5 DYSLIPIDEMIA: ICD-10-CM

## 2024-01-23 DIAGNOSIS — I10 HYPERTENSION, UNSPECIFIED TYPE: ICD-10-CM

## 2024-01-23 DIAGNOSIS — E66.01 CLASS 2 SEVERE OBESITY WITH SERIOUS COMORBIDITY AND BODY MASS INDEX (BMI) OF 35.0 TO 35.9 IN ADULT, UNSPECIFIED OBESITY TYPE  (HCC): ICD-10-CM

## 2024-01-23 DIAGNOSIS — I25.118 CORONARY ARTERY DISEASE OF NATIVE ARTERY OF NATIVE HEART WITH STABLE ANGINA PECTORIS (HCC): ICD-10-CM

## 2024-01-23 PROCEDURE — 96160 PT-FOCUSED HLTH RISK ASSMT: CPT | Performed by: INTERNAL MEDICINE

## 2024-01-23 PROCEDURE — 1170F FXNL STATUS ASSESSED: CPT | Performed by: INTERNAL MEDICINE

## 2024-01-23 PROCEDURE — 99213 OFFICE O/P EST LOW 20 MIN: CPT | Performed by: INTERNAL MEDICINE

## 2024-01-23 PROCEDURE — 3075F SYST BP GE 130 - 139MM HG: CPT | Performed by: INTERNAL MEDICINE

## 2024-01-23 PROCEDURE — 1159F MED LIST DOCD IN RCRD: CPT | Performed by: INTERNAL MEDICINE

## 2024-01-23 PROCEDURE — 3008F BODY MASS INDEX DOCD: CPT | Performed by: INTERNAL MEDICINE

## 2024-01-23 PROCEDURE — G0439 PPPS, SUBSEQ VISIT: HCPCS | Performed by: INTERNAL MEDICINE

## 2024-01-23 PROCEDURE — 3078F DIAST BP <80 MM HG: CPT | Performed by: INTERNAL MEDICINE

## 2024-01-23 PROCEDURE — 1125F AMNT PAIN NOTED PAIN PRSNT: CPT | Performed by: INTERNAL MEDICINE

## 2024-01-23 RX ORDER — METOPROLOL SUCCINATE 100 MG/1
100 TABLET, EXTENDED RELEASE ORAL DAILY
Qty: 90 TABLET | Refills: 1 | Status: SHIPPED | OUTPATIENT
Start: 2024-01-23 | End: 2024-07-21

## 2024-01-23 NOTE — PROGRESS NOTES
Subjective:   Corrine Keen is a 78 year old female who presents for a MA (Medicare Advantage) Supervisit (Once per calendar year) and scheduled follow up of multiple significant but stable problems and acute exacerbation of a chronic illness.   78-year-old pleasant lady here for medical reasons super visit.  She report that she is doing well.  Her home blood pressures running high.  She denies any chest pain, palpitations, nausea or vomiting.  Denies any hypoglycemic events.    History/Other:   Fall Risk Assessment:   She has been screened for Falls and is low risk.      Cognitive Assessment:   She had a completely normal cognitive assessment - see flowsheet entries     Functional Ability/Status:   Corrine Keen has a completely normal functional assessment. See flowsheet for details.      Depression Screening (PHQ-2/PHQ-9): PHQ-2 SCORE: 0, done 1/23/2024        <5 minutes spent screening and counseling for depression    Advanced Directives:   She does NOT have a Living Will. [ ]  She does NOT have a Power of  for Health Care. [ ]  Discussed Advance Care Planning with patient (and family/surrogate if present). Standard forms made available to patient in After Visit Summary.      Patient Active Problem List   Diagnosis    Type 2 diabetes mellitus without complication, without long-term current use of insulin (HCC)    Hypertension    Polycythemia vera ([facility])    Dyslipidemia    Class 2 severe obesity with serious comorbidity and body mass index (BMI) of 35.0 to 35.9 in adult  (HCC)    Coronary artery disease of native heart with stable angina pectoris (HCC)     Allergies:  She is allergic to lisinopril.    Current Medications:  Outpatient Medications Marked as Taking for the 1/23/24 encounter (Office Visit) with Jer Steen MD   Medication Sig    metoprolol succinate ER (TOPROL XL) 100 MG Oral Tablet 24 Hr Take 1 tablet (100 mg total) by mouth daily.    hydroxyurea 500 MG Oral Cap TAKE 2 CAPSULES IN THE  MORNING, AND 1 CAPSULE IN THE EVENING FOR A TOTAL OF 1500 MG DAILY.    COD LIVER OIL OR Take by mouth.    Glucose Blood (ACCU-CHEK GUIDE) In Vitro Strip Check blood sugar once a day    metFORMIN HCl 1000 MG Oral Tab Take 1 tablet (1,000 mg total) by mouth 2 (two) times daily.    linaGLIPtin (TRADJENTA) 5 mg Oral Tab Take 1 tablet (5 mg total) by mouth daily.    rosuvastatin 5 MG Oral Tab Take 1 tablet (5 mg total) by mouth nightly.    folic acid 400 MCG Oral Tab Take 1 tablet (400 mcg total) by mouth daily.    losartan 50 MG Oral Tab Take 1 tablet (50 mg total) by mouth daily.    Accu-Chek Softclix Lancets Does not apply Misc Use to check blood sugars once daily    Blood Glucose Monitoring Suppl (ACCU-CHEK GUIDE) w/Device Does not apply Kit Check blood sugar once a day    Glucose Blood (CONTOUR NEXT TEST) In Vitro Strip Check sugars once a day E 11.65 without insulin use    pantoprazole 40 MG Oral Tab EC Take 1 tablet (40 mg total) by mouth daily.    Hydroquinone 4 % External Cream Apply 1 Application. topically 2 (two) times daily. Use bid as directed to hands    triamcinolone 0.1 % External Cream Apply topically 2 (two) times daily. To hands and feet    erythromycin 5 MG/GM Ophthalmic Ointment Use bid to cyst at corner of right eye    Cholecalciferol (VITAMIN D) 50 MCG (2000 UT) Oral Tab Take 1 tablet by mouth daily.    clopidogrel 75 MG Oral Tab Take 1 tablet (75 mg total) by mouth daily.    aspirin 81 MG Oral Chew Tab Chew 1 tablet (81 mg total) by mouth daily.       Medical History:  She  has a past medical history of Diabetes (HCC), Elevated troponin (12/27/2022), Hypertension, and Polycythemia vera (HCC).  Surgical History:  She  has a past surgical history that includes Cataract extraction (2019); dilation/curettage,diagnostic; and edmundo biopsy stereo nodule 1 site right (cpt=19081).   Family History:  Her family history includes Breast Cancer in her sister and sister; Breast Cancer (age of onset: 75) in her  sister; Hypertension in her father and mother.  Social History:  She  reports that she has never smoked. She has never used smokeless tobacco. She reports that she does not currently use alcohol. She reports that she does not use drugs.    Tobacco:  She has never smoked tobacco.    CAGE Alcohol Screen:   CAGE screening score of 0 on 1/18/2024, showing low risk of alcohol abuse.      Patient Care Team:  Jer Steen MD as PCP - General (Internal Medicine)    Review of Systems   Constitutional:  Negative for activity change, appetite change and fever.   HENT:  Negative for congestion and voice change.    Respiratory:  Negative for cough and shortness of breath.    Cardiovascular:  Negative for chest pain.   Gastrointestinal:  Negative for abdominal distention, abdominal pain and vomiting.   Genitourinary:  Negative for hematuria.   Skin:  Negative for wound.   Psychiatric/Behavioral:  Negative for behavioral problems.          Objective:   Physical Exam  Constitutional:       Appearance: Normal appearance.   HENT:      Head: Normocephalic.   Eyes:      Conjunctiva/sclera: Conjunctivae normal.   Cardiovascular:      Rate and Rhythm: Normal rate and regular rhythm.      Heart sounds: Normal heart sounds. No murmur heard.  Pulmonary:      Effort: Pulmonary effort is normal.      Breath sounds: Normal breath sounds. No rhonchi or rales.   Abdominal:      General: Bowel sounds are normal.      Palpations: Abdomen is soft.      Tenderness: There is no abdominal tenderness.   Musculoskeletal:      Cervical back: Neck supple.      Right lower leg: No edema.      Left lower leg: No edema.   Skin:     General: Skin is warm and dry.   Neurological:      General: No focal deficit present.      Mental Status: She is alert and oriented to person, place, and time. Mental status is at baseline.   Psychiatric:         Mood and Affect: Mood normal.         Behavior: Behavior normal.     Bilateral barefoot skin diabetic exam is  normal, visualized feet and the appearance is normal.  Bilateral monofilament/sensation of both feet is normal.  Pulsation pedal pulse exam of both lower legs/feet is normal as well.         /62 (BP Location: Right arm, Patient Position: Sitting, Cuff Size: large)   Pulse 89   Ht 5' 3.5\" (1.613 m)   Wt 197 lb (89.4 kg)   SpO2 98%   BMI 34.35 kg/m²  Estimated body mass index is 34.35 kg/m² as calculated from the following:    Height as of this encounter: 5' 3.5\" (1.613 m).    Weight as of this encounter: 197 lb (89.4 kg).    Medicare Hearing Assessment:   Hearing Screening    Screening Method: Questionnaire  I have a problem hearing over the telephone: No I have trouble following the conversations when two or more people are talking at the same time: No   I have trouble understanding things on the TV: No I have to strain to understand conversations: No   I have to worry about missing the telephone ring or doorbell: No I have trouble hearing conversations in a noisy background such as a crowded room or restaurant: No   I get confused about where sounds come from: No I misunderstand some words in a sentence and need to ask people to repeat themselves: No   I especially have trouble understanding the speech of women and children: No I have trouble understanding the speaker in a large room such as at a meeting or place of [place]: No   Many people I talk to seem to mumble (or don't speak clearly): No People get annoyed because I misunderstand what they say: No   I misunderstand what others are saying and make inappropriate responses: No I avoid social activities because I cannot hear well and fear I will reply improperly: No   Family members and friends have told me they think I may have hearing loss: No                   Assessment & Plan:   Corrine Keen is a 78 year old female who presents for a Medicare Assessment.     1. Type 2 diabetes mellitus without complication, without long-term current use of  insulin (HCC) (Primary) check A1c and adjust medicines if needed.  -     Basic Metabolic Panel (8); Future; Expected date: 01/23/2024  -     Hemoglobin A1C; Future; Expected date: 01/23/2024  -     TSH W Reflex To Free T4; Future; Expected date: 01/23/2024  -     Lipid Panel; Future; Expected date: 01/23/2024  -     Microalb/Creat Ratio, Random Urine; Future; Expected date: 01/23/2024  2. Polycythemia vera (HCC) was following up with Dr. Petersen.  Now has appointment with Dr. Jackson will follow recommendations  Overview:  Dr Jackson  3. Class 2 severe obesity with serious comorbidity and body mass index (BMI) of 35.0 to 35.9 in adult, unspecified obesity type  (HCC) stable  4. Coronary artery disease of native artery of native heart with stable angina pectoris (HCC)-stable.  [facility]  Overview:  S/p PCI       6. Dyslipidemia continue statins  Other orders  -     Metoprolol Succinate ER; Take 1 tablet (100 mg total) by mouth daily.  Dispense: 90 tablet; Refill: 1    Additional evaluation apart from Medicare advantage super visit      5. Hypertension, unspecified type I have rechecked the blood pressure.  It is running slightly high.  Increased metoprolol to 100 mg daily.  Take it along with losartan.  Discussed low-salt diet.    The patient indicates understanding of these issues and agrees to the plan.  Reinforced healthy diet, lifestyle, and exercise.      No follow-ups on file.     Jer Steen MD, 1/23/2024     Supplementary Documentation:   General Health:          Corrine Keen's SCREENING SCHEDULE   Tests on this list are recommended by your physician but may not be covered, or covered at this frequency, by your insurer.   Please check with your insurance carrier before scheduling to verify coverage.   PREVENTATIVE SERVICES FREQUENCY &  COVERAGE DETAILS LAST COMPLETION DATE   Diabetes Screening    Fasting Blood Sugar /  Glucose    One screening every 12 months if never tested or if previously tested but  not diagnosed with pre-diabetes   One screening every 6 months if diagnosed with pre-diabetes Lab Results   Component Value Date     (H) 12/11/2023        Cardiovascular Disease Screening    Lipid Panel  Cholesterol  Lipoprotein (HDL)  Triglycerides Covered every 5 years for all Medicare beneficiaries without apparent signs or symptoms of cardiovascular disease Lab Results   Component Value Date    CHOLEST 166 12/29/2022    HDL 66 (H) 12/29/2022    LDL 78 12/29/2022    TRIG 128 12/29/2022         Electrocardiogram (EKG)   Covered if needed at Welcome to Medicare, and non-screening if indicated for medical reasons 01/02/2023      Ultrasound Screening for Abdominal Aortic Aneurysm (AAA) Covered once in a lifetime for one of the following risk factors    Men who are 65-75 years old and have ever smoked    Anyone with a family history -     Colorectal Cancer Screening  Covered for ages 50-85; only need ONE of the following:    Colonoscopy   Covered every 10 years    Covered every 2 years if patient is at high risk or previous colonoscopy was abnormal 08/02/2019    Health Maintenance   Topic Date Due    Colorectal Cancer Screening  08/02/2022       Flexible Sigmoidoscopy   Covered every 4 years -    Fecal Occult Blood Test Covered annually -   Bone Density Screening    Bone density screening    Covered every 2 years after age 65 if diagnosed with risk of osteoporosis or estrogen deficiency.    Covered yearly for long-term glucocorticoid medication use (Steroids) No results found for this or any previous visit.      Health Maintenance Due   Topic Date Due    DEXA Scan  Never done      Pap and Pelvic    Pap   Covered every 2 years for women at normal risk; Annually if at high risk -  No recommendations at this time    Chlamydia Annually if high risk -  No recommendations at this time   Screening Mammogram    Mammogram     Recommend annually for all female patients aged 40 and older    One baseline mammogram covered  for patients aged 35-39 01/18/2024    Health Maintenance   Topic Date Due    Mammogram  Discontinued       Immunizations    Influenza Covered once per flu season  Please get every year 12/12/2023  No recommendations at this time    Pneumococcal Each vaccine ([id number] & [id number]) covered once after 65 Prevnar 13: -    [id number]: -     Pneumococcal Vaccination(1 of 2 - PCV) Never done    Hepatitis B One screening covered for patients with certain risk factors   -  No recommendations at this time    Tetanus Toxoid Not covered by Medicare Part B unless medically necessary (cut with metal); may be covered with your pharmacy prescription benefits -    Tetanus, Diptheria and Pertusis TD and TDaP Not covered by Medicare Part B -  No recommendations at this time    Zoster Not covered by Medicare Part B; may be covered with your pharmacy  prescription benefits -  Zoster Vaccines(1 of 2) Never done     Diabetes      Hemoglobin A1C Annually; if last result is elevated, may be asked to retest more frequently.    Medicare covers every 3 months Lab Results   Component Value Date     (H) 03/17/2023    A1C 6.7 (A) 09/11/2023       No recommendations at this time    Creat/alb ratio Annually Lab Results   Component Value Date    MICROALBCREA 15.7 01/03/2023       LDL Annually Lab Results   Component Value Date    LDL 78 12/29/2022       Dilated Eye Exam Annually Last Diabetic Eye Exam:  Last Dilated Eye Exam: 05/09/23  No data recorded       Annual Monitoring of Persistent Medications (ACE/ARB, digoxin diuretics, anticonvulsants)    Potassium Annually Lab Results   Component Value Date    K 4.2 12/11/2023         Creatinine   Annually Lab Results   Component Value Date    CREATSERUM 0.86 12/11/2023         BUN Annually Lab Results   Component Value Date    BUN 14 12/11/2023       Drug Serum Conc Annually No results found for: \"DIGOXIN\", \"DIG\", \"VALP\"

## 2024-01-30 ENCOUNTER — LAB ENCOUNTER (OUTPATIENT)
Dept: LAB | Facility: HOSPITAL | Age: 79
End: 2024-01-30
Attending: INTERNAL MEDICINE
Payer: MEDICARE

## 2024-01-30 DIAGNOSIS — E11.9 TYPE 2 DIABETES MELLITUS WITHOUT COMPLICATION, WITHOUT LONG-TERM CURRENT USE OF INSULIN (HCC): ICD-10-CM

## 2024-01-30 LAB
ANION GAP SERPL CALC-SCNC: 10 MMOL/L (ref 0–18)
BUN BLD-MCNC: 11 MG/DL (ref 9–23)
BUN/CREAT SERPL: 12.8 (ref 10–20)
CALCIUM BLD-MCNC: 9.9 MG/DL (ref 8.7–10.4)
CHLORIDE SERPL-SCNC: 109 MMOL/L (ref 98–112)
CHOLEST SERPL-MCNC: 175 MG/DL (ref ?–200)
CO2 SERPL-SCNC: 25 MMOL/L (ref 21–32)
CREAT BLD-MCNC: 0.86 MG/DL
CREAT UR-SCNC: 95 MG/DL
EGFRCR SERPLBLD CKD-EPI 2021: 70 ML/MIN/1.73M2 (ref 60–?)
EST. AVERAGE GLUCOSE BLD GHB EST-MCNC: 128 MG/DL (ref 68–126)
FASTING PATIENT LIPID ANSWER: NO
FASTING STATUS PATIENT QL REPORTED: NO
GLUCOSE BLD-MCNC: 70 MG/DL (ref 70–99)
HBA1C MFR BLD: 6.1 % (ref ?–5.7)
HDLC SERPL-MCNC: 57 MG/DL (ref 40–59)
LDLC SERPL CALC-MCNC: 90 MG/DL (ref ?–100)
MICROALBUMIN UR-MCNC: 1.4 MG/DL
MICROALBUMIN/CREAT 24H UR-RTO: 14.7 UG/MG (ref ?–30)
NONHDLC SERPL-MCNC: 118 MG/DL (ref ?–130)
OSMOLALITY SERPL CALC.SUM OF ELEC: 296 MOSM/KG (ref 275–295)
POTASSIUM SERPL-SCNC: 4.3 MMOL/L (ref 3.5–5.1)
SODIUM SERPL-SCNC: 144 MMOL/L (ref 136–145)
TRIGL SERPL-MCNC: 163 MG/DL (ref 30–149)
TSI SER-ACNC: 2.13 MIU/ML (ref 0.55–4.78)
VLDLC SERPL CALC-MCNC: 26 MG/DL (ref 0–30)

## 2024-01-30 PROCEDURE — 80048 BASIC METABOLIC PNL TOTAL CA: CPT

## 2024-01-30 PROCEDURE — 82570 ASSAY OF URINE CREATININE: CPT

## 2024-01-30 PROCEDURE — 36415 COLL VENOUS BLD VENIPUNCTURE: CPT

## 2024-01-30 PROCEDURE — 82043 UR ALBUMIN QUANTITATIVE: CPT

## 2024-01-30 PROCEDURE — 80061 LIPID PANEL: CPT

## 2024-01-30 PROCEDURE — 84443 ASSAY THYROID STIM HORMONE: CPT

## 2024-01-30 PROCEDURE — 83036 HEMOGLOBIN GLYCOSYLATED A1C: CPT

## 2024-02-14 ENCOUNTER — PATIENT MESSAGE (OUTPATIENT)
Facility: CLINIC | Age: 79
End: 2024-02-14

## 2024-02-18 ENCOUNTER — APPOINTMENT (OUTPATIENT)
Dept: GENERAL RADIOLOGY | Facility: HOSPITAL | Age: 79
End: 2024-02-18
Attending: EMERGENCY MEDICINE
Payer: MEDICARE

## 2024-02-18 ENCOUNTER — HOSPITAL ENCOUNTER (EMERGENCY)
Facility: HOSPITAL | Age: 79
Discharge: HOME | End: 2024-02-18
Attending: EMERGENCY MEDICINE
Payer: MEDICARE

## 2024-02-18 VITALS
WEIGHT: 196.19 LBS | TEMPERATURE: 99 F | SYSTOLIC BLOOD PRESSURE: 152 MMHG | HEART RATE: 69 BPM | OXYGEN SATURATION: 96 % | BODY MASS INDEX: 34 KG/M2 | DIASTOLIC BLOOD PRESSURE: 82 MMHG | RESPIRATION RATE: 14 BRPM

## 2024-02-18 DIAGNOSIS — I10 PRIMARY HYPERTENSION: Primary | ICD-10-CM

## 2024-02-18 LAB
ANION GAP SERPL CALC-SCNC: 8 MMOL/L (ref 0–18)
ATRIAL RATE: 79 BPM
BASOPHILS # BLD AUTO: 0.06 X10(3) UL (ref 0–0.2)
BASOPHILS NFR BLD AUTO: 0.9 %
BUN BLD-MCNC: 11 MG/DL (ref 9–23)
CALCIUM BLD-MCNC: 9.9 MG/DL (ref 8.7–10.4)
CHLORIDE SERPL-SCNC: 107 MMOL/L (ref 98–112)
CO2 SERPL-SCNC: 26 MMOL/L (ref 21–32)
CREAT BLD-MCNC: 0.88 MG/DL
DEPRECATED RDW RBC AUTO: 58.1 FL (ref 35.1–46.3)
EGFRCR SERPLBLD CKD-EPI 2021: 68 ML/MIN/1.73M2 (ref 60–?)
EOSINOPHIL # BLD AUTO: 0.12 X10(3) UL (ref 0–0.7)
EOSINOPHIL NFR BLD AUTO: 1.7 %
ERYTHROCYTE [DISTWIDTH] IN BLOOD BY AUTOMATED COUNT: 13.7 % (ref 11–15)
GLUCOSE BLD-MCNC: 129 MG/DL (ref 70–99)
HCT VFR BLD AUTO: 39.4 %
HGB BLD-MCNC: 13.5 G/DL
IMM GRANULOCYTES # BLD AUTO: 0.07 X10(3) UL (ref 0–1)
IMM GRANULOCYTES NFR BLD: 1 %
LYMPHOCYTES # BLD AUTO: 1.92 X10(3) UL (ref 1–4)
LYMPHOCYTES NFR BLD AUTO: 27.9 %
MCH RBC QN AUTO: 39.6 PG (ref 26–34)
MCHC RBC AUTO-ENTMCNC: 34.3 G/DL (ref 31–37)
MCV RBC AUTO: 115.5 FL
MONOCYTES # BLD AUTO: 0.63 X10(3) UL (ref 0.1–1)
MONOCYTES NFR BLD AUTO: 9.2 %
NEUTROPHILS # BLD AUTO: 4.07 X10 (3) UL (ref 1.5–7.7)
NEUTROPHILS # BLD AUTO: 4.07 X10(3) UL (ref 1.5–7.7)
NEUTROPHILS NFR BLD AUTO: 59.3 %
P AXIS: 50 DEGREES
P-R INTERVAL: 172 MS
PLATELET # BLD AUTO: 414 10(3)UL (ref 150–450)
POTASSIUM SERPL-SCNC: 3.6 MMOL/L (ref 3.5–5.1)
Q-T INTERVAL: 378 MS
QRS DURATION: 84 MS
QTC CALCULATION (BEZET): 433 MS
R AXIS: -25 DEGREES
RBC # BLD AUTO: 3.41 X10(6)UL
SODIUM SERPL-SCNC: 141 MMOL/L (ref 136–145)
T AXIS: 49 DEGREES
TROPONIN I SERPL HS-MCNC: 27 NG/L
VENTRICULAR RATE: 79 BPM
WBC # BLD AUTO: 6.9 X10(3) UL (ref 4–11)

## 2024-02-18 PROCEDURE — 84484 ASSAY OF TROPONIN QUANT: CPT | Performed by: EMERGENCY MEDICINE

## 2024-02-18 PROCEDURE — 99284 EMERGENCY DEPT VISIT MOD MDM: CPT

## 2024-02-18 PROCEDURE — 93005 ELECTROCARDIOGRAM TRACING: CPT

## 2024-02-18 PROCEDURE — 71045 X-RAY EXAM CHEST 1 VIEW: CPT | Performed by: EMERGENCY MEDICINE

## 2024-02-18 PROCEDURE — 93010 ELECTROCARDIOGRAM REPORT: CPT

## 2024-02-18 PROCEDURE — 85025 COMPLETE CBC W/AUTO DIFF WBC: CPT | Performed by: EMERGENCY MEDICINE

## 2024-02-18 PROCEDURE — 36415 COLL VENOUS BLD VENIPUNCTURE: CPT

## 2024-02-18 PROCEDURE — 80048 BASIC METABOLIC PNL TOTAL CA: CPT | Performed by: EMERGENCY MEDICINE

## 2024-02-18 NOTE — ED PROVIDER NOTES
Patient Seen in: [facility] Emergency Department      History     Chief Complaint   Patient presents with    Blood Pressure     Stated Complaint: HTN    Subjective:   HPI    78-year-old female with history of diabetes, hypertension, polycythemia vera presenting to the emergency department due to concern for elevated blood pressure without symptoms.    Patient takes blood pressures approximately twice daily.  Noticed blood pressures elevated yesterday and subsequently called on-call PCP office.  Instructed to take 2 doses of losartan and recheck blood pressures.  If not improved then present to the emergency department.  Patient presenting as instructed.    Patient recording blood pressures after taking 2 doses of losartan with max systolic under 200 feet and max diastolic under 100.  Patient asymptomatic in the emergency department on evaluation.  Does not report acute chest pain, head pain, neck pain, back pain, vision changes, vertiginous symptoms, shortness of breath, extremity strength or sensation changes.  Patient does report questionable single episode of chest pain in the morning which lasted seconds and spontaneously resolved.  Does not detail any acute falls or trauma.    Objective:   Past Medical History:   Diagnosis Date    Diabetes (HCC)     Elevated troponin 12/27/2022    Hypertension     Polycythemia vera (HCC)               Past Surgical History:   Procedure Laterality Date    CATARACT EXTRACTION  2019    Right eye     DILATION/CURETTAGE,DIAGNOSTIC      DAYANNA BIOPSY STEREO NODULE 1 SITE RIGHT (CPT=19081)      2015? results benign                Social History     Socioeconomic History    Marital status: Single   Tobacco Use    Smoking status: Never    Smokeless tobacco: Never   Vaping Use    Vaping Use: Never used   Substance and Sexual Activity    Alcohol use: Not Currently     Comment: socially drinks     Drug use: Never    Sexual activity: Yes   Other Topics Concern    Reaction to local  anesthetic No    Pt has a pacemaker No    Pt has a defibrillator No              Review of Systems    Positive for stated complaint: HTN  Other systems are as noted in HPI.  Constitutional and vital signs reviewed.      All other systems reviewed and negative except as noted above.    Physical Exam     ED Triage Vitals [02/18/24 0155]   BP (!) 164/105   Pulse 85   Resp 20   Temp 98.9 °F (37.2 °C)   Temp src Temporal   SpO2 98 %   O2 Device None (Room air)       Current:BP (!) 164/105   Pulse 85   Temp 98.9 °F (37.2 °C) (Temporal)   Resp 20   Wt 89 kg   SpO2 98%   BMI 34.21 kg/m²         Physical Exam    Physical Exam:   /82   Pulse 69   Temp 98.9 °F (37.2 °C) (Temporal)   Resp 14   Wt 89 kg   SpO2 96%   BMI 34.21 kg/m²  - I reviewed these vital signs    Constitutional: Pt is well appearing, in no distress  HEENT: Normocephalic/Atraumatic, EOM grossly normal, Conjunctiva Clear, MMM   Neck: Range of motion intact, no midline tenderness to palpation  Lungs: CTA B, No crepitus, Talking in full sentences in no respiratory distress  Cardiovascular: RRR: Yes, equal pulses  Abdominal: No rebound or guarding, soft, non-distended, no masses, no discomfort to palpation   Back: No midline tenderness to palpation  Rectal: deferred  : deferred  Musculoskeletal: No deformities noted, No cyanosis/clubbing noted, No tenderness to palpation  Neurologic: A&O x 3, Speech clear, No facial asymmetry noted, Equal strength and sensation in extremities appreciated, DTR intact in UE and LE bilaterally  Psychiatric: Mood and affect are appropriate, Speech not pressured, Thought process is logical  Skin: Warm and dry, No rash      ED Course   Labs Reviewed - No data to display  EKG    EKG read interpreted by me showing rate 79, , QRS 84, QTc 433, sinus rhythm, no STEMI.         MDM          Medical Decision Making  78-year-old female presenting for concern for elevated blood pressure.  Asymptomatic otherwise.  Vital  signs reassuring on presentation to the emergency department.  Well-appearing otherwise.    Differential diagnosis includes the following but is not limited to: Poor medication control, Uncomplicated HTN, unlikely atypical ACS as the patient does not have ongoing acute chest pain.  CVA unlikely as the patient does not have sensation or strength complaints without focal findings on exam, other endorgan ischemia unlikely as the patient does not have acute vision changes.    CBC without leukocytosis with normal hemoglobin.  BMP with no evidence of DEVIN or creatinine abnormality.  Troponin at 27 within normal limits for patient's age and gender.  EKG as detailed above.  Overall have low concern for ACS.  No evidence of endorgan ischemia lessening concern for hypertensive emergency.  Patient still currently asymptomatic.  Chest x-ray read interpreted by me showing no evidence of mediastinal widening.  Radiology read without details of cardiopulmonary abnormalities.  Do not suspect acute intrathoracic structural abnormality as a result of hypertension such as dissection.  Patient with exam reassuring lessening concern for this as well.    Suspect uncomplicated hypertension versus poor medication compliance versus poorly controlled hypertension as a cause for the patient's presentation.    Patient's blood pressures within normal limits and improved throughout stay in the emergency department with no medications needed.    Have discussed that the patient needs to follow-up with her primary care physician for further medication management and tailoring.  Patient will be discharged with proper return precautions including the following but not limited to: Headache, chest pain, shortness of breath, extremity sensation or strength changes, change in vision.  Patient be given proper follow-up instructions as well.  Agreeable with plan for discharge at this time.          Amount and/or Complexity of Data Reviewed  Independent  Historian:      Details: Family member  External Data Reviewed: notes.  Labs: ordered.     Details: As detailed in the MDM  ECG/medicine tests: ordered and independent interpretation performed.     Details: As detailed in MDM        Disposition and Plan     Clinical Impression:  No diagnosis found.     Disposition:  There is no disposition on file for this visit.  There is no disposition time on file for this visit.    Follow-up:  No follow-up provider specified.  We recommend that you schedule follow up care with a primary care provider within the next three months to obtain basic health screening including reassessment of your blood pressure.      Medications Prescribed:  Current Discharge Medication List

## 2024-02-18 NOTE — DISCHARGE INSTRUCTIONS
--Return for worsening symptoms or any other concerns as we discussed including the following but not limited to: headache, chest pain, shortness of breath, extremity sensation or strength changes, change in vision  --Rest, instructions for home care as discussed  --Please take all medications as prescribed

## 2024-02-18 NOTE — ED INITIAL ASSESSMENT (HPI)
Checks BP daily at home, has been running higher today (196/100 around midnight)    Spoke with on-call PCP, was advised to take two losartan (taken at 2130), if no improvement go to ED.      Current /105, denies chest pain, headache, vision changes, dizziness.  No respiratory distress,

## 2024-02-20 ENCOUNTER — TELEPHONE (OUTPATIENT)
Dept: INTERNAL MEDICINE CLINIC | Facility: CLINIC | Age: 79
End: 2024-02-20

## 2024-02-20 NOTE — TELEPHONE ENCOUNTER
Patient called (identified name and ),   Seen at ED 2024 for hypertension.  Workup was OK.  Needs follow up appointment.  Blood pressure running 167/91.  Taking metoprolol 100 mg and losartan 50 mg.  Scheduled:  Future Appointments   Date Time Provider Department Center   2024 10:40 AM Kesha Scott APRN [facility]   2024 10:00 AM Jer Steen MD [facility]   6/10/2024  8:00 AM CMA [facility]   6/10/2024  9:00 AM Renetta Vicente MD [facility]

## 2024-02-21 ENCOUNTER — OFFICE VISIT (OUTPATIENT)
Dept: INTERNAL MEDICINE CLINIC | Facility: CLINIC | Age: 79
End: 2024-02-21
Payer: MEDICARE

## 2024-02-21 VITALS
DIASTOLIC BLOOD PRESSURE: 79 MMHG | SYSTOLIC BLOOD PRESSURE: 160 MMHG | HEIGHT: 63.5 IN | HEART RATE: 75 BPM | WEIGHT: 197 LBS | BODY MASS INDEX: 34.47 KG/M2

## 2024-02-21 DIAGNOSIS — I10 HYPERTENSION, UNSPECIFIED TYPE: Primary | ICD-10-CM

## 2024-02-21 PROCEDURE — 3078F DIAST BP <80 MM HG: CPT | Performed by: NURSE PRACTITIONER

## 2024-02-21 PROCEDURE — 1160F RVW MEDS BY RX/DR IN RCRD: CPT | Performed by: NURSE PRACTITIONER

## 2024-02-21 PROCEDURE — 3077F SYST BP >= 140 MM HG: CPT | Performed by: NURSE PRACTITIONER

## 2024-02-21 PROCEDURE — 1159F MED LIST DOCD IN RCRD: CPT | Performed by: NURSE PRACTITIONER

## 2024-02-21 PROCEDURE — 99214 OFFICE O/P EST MOD 30 MIN: CPT | Performed by: NURSE PRACTITIONER

## 2024-02-21 PROCEDURE — 3008F BODY MASS INDEX DOCD: CPT | Performed by: NURSE PRACTITIONER

## 2024-02-21 RX ORDER — AMLODIPINE BESYLATE 5 MG/1
5 TABLET ORAL DAILY
Qty: 90 TABLET | Refills: 3 | Status: SHIPPED | OUTPATIENT
Start: 2024-02-21 | End: 2025-02-15

## 2024-02-21 NOTE — ASSESSMENT & PLAN NOTE
Hypertension  Blood pressure 160/79, pulse 75, height 5' 3.5\" (1.613 m), weight 197 lb (89.4 kg).   Patient has taken amlodipine in the past.  Discussed with .    Plan    She is on Losartan 50mg po daily  Metoprolol 100 mg daily  Will add amlodipine 5mg po daily    Return in 1-2 weeks for blood pressure check.

## 2024-02-21 NOTE — PROGRESS NOTES
HPI:    Patient ID: Corrine Keen is a 78 year old female.    HPI ER Follow up    ER NOTE  78-year-old female with history of diabetes, hypertension, polycythemia vera presenting to the emergency department due to concern for elevated blood pressure without symptoms.   CBC without leukocytosis with normal hemoglobin. BMP with no evidence of DEVIN or creatinine abnormality. Troponin at 27 within normal limits for patient's age and gender. EKG as detailed above. Overall have low concern for ACS. No evidence of endorgan ischemia lessening concern for hypertensive emergency. Patient still currently asymptomatic. Chest x-ray read interpreted by me showing no evidence of mediastinal widening. Radiology read without details of cardiopulmonary abnormalities. Do not suspect acute intrathoracic structural abnormality as a result of hypertension such as dissection. Patient with exam reassuring lessening concern for this as well.       Patient B/P has been running high.      Blood pressure 160/79, pulse 75, height 5' 3.5\" (1.613 m), weight 197 lb (89.4 kg).   Immunization History   Administered Date(s) Administered    Covid-19 Vaccine Pfizer 30 mcg/0.3 ml 01/12/2021, 01/29/2021, 12/30/2021    Covid-19 Vaccine Pfizer Reyes-Sucrose 30 mcg/0.3 ml 08/14/2022    FLU VAC High Dose 65 YRS & Older PRSV Free (53631) 10/21/2022    HIGH DOSE FLU 65 YRS AND OLDER PRSV FREE SINGLE D (48315) FLU CLINIC 12/12/2023    Influenza 10/24/2021       Past Medical History:   Diagnosis Date    Diabetes (HCC)     Elevated troponin 12/27/2022    Hypertension     Polycythemia vera (HCC)       Past Surgical History:   Procedure Laterality Date    Cataract extraction  2019    Right eye     Dilation/curettage,diagnostic      Sara biopsy stereo nodule 1 site right (cpt=19081)      2015? results benign      Social History     Socioeconomic History    Marital status: Single   Tobacco Use    Smoking status: Never    Smokeless tobacco: Never   Vaping Use    Vaping  Use: Never used   Substance and Sexual Activity    Alcohol use: Not Currently     Comment: socially drinks     Drug use: Never    Sexual activity: Yes   Other Topics Concern    Reaction to local anesthetic No    Pt has a pacemaker No    Pt has a defibrillator No          Review of Systems   Constitutional:  Negative for chills, fatigue and fever.   HENT:  Negative for congestion, ear discharge, ear pain, facial swelling, hearing loss, postnasal drip, sinus pressure, sore throat and trouble swallowing.    Eyes:  Negative for pain, discharge, redness and visual disturbance.   Respiratory:  Negative for cough, chest tightness, shortness of breath and wheezing.    Cardiovascular:  Negative for chest pain, palpitations and leg swelling.   Gastrointestinal:  Negative for abdominal distention, abdominal pain, constipation, diarrhea, nausea and vomiting.   Endocrine: Negative for cold intolerance, heat intolerance, polydipsia, polyphagia and polyuria.   Genitourinary:  Negative for difficulty urinating, dysuria, pelvic pain and vaginal bleeding.   Musculoskeletal:  Negative for back pain, gait problem, neck pain and neck stiffness.   Skin:  Negative for color change and rash.   Neurological:  Negative for dizziness, seizures, weakness and headaches.   Psychiatric/Behavioral:  Negative for agitation and sleep disturbance. The patient is not nervous/anxious.               Current Outpatient Medications   Medication Sig Dispense Refill    amLODIPine 5 MG Oral Tab Take 1 tablet (5 mg total) by mouth daily. 90 tablet 3    metoprolol succinate ER (TOPROL XL) 100 MG Oral Tablet 24 Hr Take 1 tablet (100 mg total) by mouth daily. 90 tablet 1    hydroxyurea 500 MG Oral Cap TAKE 2 CAPSULES IN THE MORNING, AND 1 CAPSULE IN THE EVENING FOR A TOTAL OF 1500 MG DAILY. 270 capsule 5    COD LIVER OIL OR Take by mouth.      Glucose Blood (ACCU-CHEK GUIDE) In Vitro Strip Check blood sugar once a day 100 strip 0    metFORMIN HCl 1000 MG Oral  Tab Take 1 tablet (1,000 mg total) by mouth 2 (two) times daily. 180 tablet 3    linaGLIPtin (TRADJENTA) 5 mg Oral Tab Take 1 tablet (5 mg total) by mouth daily. 90 tablet 1    rosuvastatin 5 MG Oral Tab Take 1 tablet (5 mg total) by mouth nightly. 90 tablet 3    folic acid 400 MCG Oral Tab Take 1 tablet (400 mcg total) by mouth daily.      losartan 50 MG Oral Tab Take 1 tablet (50 mg total) by mouth daily.      Accu-Chek Softclix Lancets Does not apply Misc Use to check blood sugars once daily 100 each 0    Blood Glucose Monitoring Suppl (ACCU-CHEK GUIDE) w/Device Does not apply Kit Check blood sugar once a day 1 kit 0    Glucose Blood (CONTOUR NEXT TEST) In Vitro Strip Check sugars once a day E 11.65 without insulin use 100 each 0    pantoprazole 40 MG Oral Tab EC Take 1 tablet (40 mg total) by mouth daily. 90 tablet 1    Hydroquinone 4 % External Cream Apply 1 Application. topically 2 (two) times daily. Use bid as directed to hands 30 g 1    erythromycin 5 MG/GM Ophthalmic Ointment Use bid to cyst at corner of right eye 0.5 g 2    Cholecalciferol (VITAMIN D) 50 MCG (2000 UT) Oral Tab Take 1 tablet by mouth daily.      clopidogrel 75 MG Oral Tab Take 1 tablet (75 mg total) by mouth daily. 30 tablet 2    aspirin 81 MG Oral Chew Tab Chew 1 tablet (81 mg total) by mouth daily.       Allergies:  Allergies   Allergen Reactions    Lisinopril TONGUE SWELLING      PHYSICAL EXAM:   Physical Exam  Constitutional:       Appearance: Normal appearance. She is well-developed.   HENT:      Head: Normocephalic.      Right Ear: Tympanic membrane normal.      Left Ear: Tympanic membrane normal.      Nose: Nose normal.      Mouth/Throat:      Mouth: Mucous membranes are moist.      Pharynx: No oropharyngeal exudate or posterior oropharyngeal erythema.   Eyes:      General:         Right eye: No discharge.         Left eye: No discharge.      Pupils: Pupils are equal, round, and reactive to light.   Cardiovascular:      Rate and  Rhythm: Normal rate and regular rhythm.      Heart sounds: Normal heart sounds. No murmur heard.     No friction rub. No gallop.   Pulmonary:      Effort: Pulmonary effort is normal. No respiratory distress.      Breath sounds: Normal breath sounds. No wheezing, rhonchi or rales.   Abdominal:      General: Bowel sounds are normal. There is no distension.      Palpations: Abdomen is soft. There is no mass.      Tenderness: There is no abdominal tenderness. There is no right CVA tenderness, left CVA tenderness or guarding.   Musculoskeletal:         General: No tenderness.      Cervical back: Normal range of motion and neck supple. No tenderness.      Right lower leg: No edema.      Left lower leg: No edema.   Lymphadenopathy:      Cervical: No cervical adenopathy.   Skin:     General: Skin is warm and dry.      Findings: No rash.   Neurological:      Mental Status: She is alert and oriented to person, place, and time.      Coordination: Coordination normal.      Gait: Gait normal.   Psychiatric:         Mood and Affect: Mood normal.         Behavior: Behavior normal.         Thought Content: Thought content normal.         Judgment: Judgment normal.       /79 (BP Location: Left arm, Patient Position: Sitting, Cuff Size: large)   Pulse 75   Ht 5' 3.5\" (1.613 m)   Wt 197 lb (89.4 kg)   BMI 34.35 kg/m²   Wt Readings from Last 2 Encounters:   02/21/24 197 lb (89.4 kg)   02/18/24 196 lb 3.4 oz (89 kg)     Body mass index is 34.35 kg/m².(2)  Lab Results   Component Value Date    WBC 6.9 02/18/2024    RBC 3.41 (L) 02/18/2024    HGB 13.5 02/18/2024    HCT 39.4 02/18/2024    .5 (H) 02/18/2024    MCH 39.6 (H) 02/18/2024    MCHC 34.3 02/18/2024    RDW 13.7 02/18/2024    .0 02/18/2024      Lab Results   Component Value Date     (H) 02/18/2024    BUN 11 02/18/2024    BUNCREA 12.8 01/30/2024    CREATSERUM 0.88 02/18/2024    ANIONGAP 8 02/18/2024    GFRNAA 76 07/18/2022    GFRAA 88 07/18/2022    CA  9.9 02/18/2024    OSMOCALC 296 (H) 01/30/2024    ALKPHO 71 12/11/2023    AST 19 12/11/2023    ALT 11 12/11/2023    BILT 0.5 12/11/2023    TP 7.0 12/11/2023    ALB 3.9 12/11/2023    GLOBULIN 3.1 12/11/2023     02/18/2024    K 3.6 02/18/2024     02/18/2024    CO2 26.0 02/18/2024      Lab Results   Component Value Date     (H) 01/30/2024    A1C 6.1 (H) 01/30/2024      Lab Results   Component Value Date    CHOLEST 175 01/30/2024    TRIG 163 (H) 01/30/2024    HDL 57 01/30/2024    LDL 90 01/30/2024    VLDL 26 01/30/2024    NONHDLC 118 01/30/2024      Lab Results   Component Value Date    TSH 2.132 01/30/2024                ASSESSMENT/PLAN:     Problem List Items Addressed This Visit       Hypertension - Primary     Hypertension  Blood pressure 160/79, pulse 75, height 5' 3.5\" (1.613 m), weight 197 lb (89.4 kg).   Patient has taken amlodipine in the past.  Discussed with .    Plan    She is on Losartan 50mg po daily  Metoprolol 100 mg daily  Will add amlodipine 5mg po daily    Return in 1-2 weeks for blood pressure check.           Relevant Medications    amLODIPine 5 MG Oral Tab          No orders of the defined types were placed in this encounter.      Meds This Visit:  Requested Prescriptions     Signed Prescriptions Disp Refills    amLODIPine 5 MG Oral Tab 90 tablet 3     Sig: Take 1 tablet (5 mg total) by mouth daily.     Prior to this encounter, I spent over 10 minutes with preparing for the visit, including reviewing documents from other specialties as well as from PCP and going over test results and imaging studies. During the face to face encounter, I spent an additional 15 minutes which were determined for follow-up. Greater than 50% of the time was spent in counseling, anticipatory guidance, and coordination of care. Patient concerns were answered to the best of my knowledge.    Imaging & Referrals:  None         GERARDO Quinonez

## 2024-03-06 ENCOUNTER — OFFICE VISIT (OUTPATIENT)
Dept: INTERNAL MEDICINE CLINIC | Facility: CLINIC | Age: 79
End: 2024-03-06
Payer: MEDICARE

## 2024-03-06 VITALS
HEIGHT: 63.5 IN | HEART RATE: 80 BPM | SYSTOLIC BLOOD PRESSURE: 140 MMHG | BODY MASS INDEX: 34.47 KG/M2 | WEIGHT: 197 LBS | DIASTOLIC BLOOD PRESSURE: 80 MMHG

## 2024-03-06 DIAGNOSIS — Z12.11 SCREENING FOR COLON CANCER: ICD-10-CM

## 2024-03-06 DIAGNOSIS — I10 HYPERTENSION, UNSPECIFIED TYPE: ICD-10-CM

## 2024-03-06 DIAGNOSIS — Z13.820 SCREENING FOR OSTEOPOROSIS: Primary | ICD-10-CM

## 2024-03-06 PROCEDURE — 3077F SYST BP >= 140 MM HG: CPT | Performed by: NURSE PRACTITIONER

## 2024-03-06 PROCEDURE — 3079F DIAST BP 80-89 MM HG: CPT | Performed by: NURSE PRACTITIONER

## 2024-03-06 PROCEDURE — 99214 OFFICE O/P EST MOD 30 MIN: CPT | Performed by: NURSE PRACTITIONER

## 2024-03-06 PROCEDURE — 3008F BODY MASS INDEX DOCD: CPT | Performed by: NURSE PRACTITIONER

## 2024-03-06 PROCEDURE — 1159F MED LIST DOCD IN RCRD: CPT | Performed by: NURSE PRACTITIONER

## 2024-03-06 PROCEDURE — 1160F RVW MEDS BY RX/DR IN RCRD: CPT | Performed by: NURSE PRACTITIONER

## 2024-03-06 RX ORDER — AMLODIPINE BESYLATE 10 MG/1
10 TABLET ORAL DAILY
Qty: 90 TABLET | Refills: 0 | Status: SHIPPED | OUTPATIENT
Start: 2024-03-06

## 2024-03-06 NOTE — PROGRESS NOTES
HPI:    Patient ID: Corrine Keen is a 78 year old female.    HPI Hypertension  78-year-old female I have met before. She has history of diabetes, hypertension, polycythemia   She was taking amlodipine in the past.  This was really added along with her losartan 50 mg and metoprolol.  Her blood pressure is improved but still not at goal  Blood pressure 140/80, pulse 80, height 5' 3.5\" (1.613 m), weight 197 lb (89.4 kg).     Screening for Osteoporosis  Needs Dexa scan according to [facility]    Diabetes  Patient states her glucose readings have been running within normal range.      Colon Fit Test-patient states this was given to her but she has not  Immunization History   Administered Date(s) Administered    Covid-19 Vaccine Pfizer 30 mcg/0.3 ml 01/12/2021, 01/29/2021, 12/30/2021    Covid-19 Vaccine Pfizer Reyes-Sucrose 30 mcg/0.3 ml 08/14/2022    FLU VAC High Dose 65 YRS & Older PRSV Free (54755) 10/21/2022    HIGH DOSE FLU 65 YRS AND OLDER PRSV FREE SINGLE D (89540) FLU CLINIC 12/12/2023    Influenza 10/24/2021       Past Medical History:   Diagnosis Date    Diabetes (HCC)     Elevated troponin 12/27/2022    Hypertension     Polycythemia vera (HCC)       Past Surgical History:   Procedure Laterality Date    Cataract extraction  2019    Right eye     Dilation/curettage,diagnostic      Sara biopsy stereo nodule 1 site right (cpt=19081)      2015? results benign      Social History     Socioeconomic History    Marital status: Single   Tobacco Use    Smoking status: Never    Smokeless tobacco: Never   Vaping Use    Vaping Use: Never used   Substance and Sexual Activity    Alcohol use: Not Currently     Comment: socially drinks     Drug use: Never    Sexual activity: Yes   Other Topics Concern    Reaction to local anesthetic No    Pt has a pacemaker No    Pt has a defibrillator No          Review of Systems   Constitutional:  Negative for chills, fatigue and fever.   HENT:  Negative for ear pain, hearing loss, sinus pain,  sore throat and trouble swallowing.    Eyes:  Negative for pain and visual disturbance.   Respiratory:  Negative for cough, chest tightness and shortness of breath.    Cardiovascular:  Negative for chest pain, palpitations and leg swelling.   Gastrointestinal:  Negative for abdominal pain, constipation, diarrhea, nausea and vomiting.   Endocrine: Negative for cold intolerance and heat intolerance.   Genitourinary:  Negative for dysuria and hematuria.   Musculoskeletal:  Negative for back pain and joint swelling.   Skin:  Negative for rash.   Allergic/Immunologic: Negative for environmental allergies.   Neurological:  Negative for weakness, numbness and headaches.   Hematological:  Does not bruise/bleed easily.   Psychiatric/Behavioral:  Negative for dysphoric mood and sleep disturbance. The patient is not nervous/anxious.               Current Outpatient Medications   Medication Sig Dispense Refill    amLODIPine 10 MG Oral Tab Take 1 tablet (10 mg total) by mouth daily. 90 tablet 0    metoprolol succinate ER (TOPROL XL) 100 MG Oral Tablet 24 Hr Take 1 tablet (100 mg total) by mouth daily. 90 tablet 1    hydroxyurea 500 MG Oral Cap TAKE 2 CAPSULES IN THE MORNING, AND 1 CAPSULE IN THE EVENING FOR A TOTAL OF 1500 MG DAILY. 270 capsule 5    COD LIVER OIL OR Take by mouth.      Glucose Blood (ACCU-CHEK GUIDE) In Vitro Strip Check blood sugar once a day 100 strip 0    metFORMIN HCl 1000 MG Oral Tab Take 1 tablet (1,000 mg total) by mouth 2 (two) times daily. 180 tablet 3    linaGLIPtin (TRADJENTA) 5 mg Oral Tab Take 1 tablet (5 mg total) by mouth daily. 90 tablet 1    rosuvastatin 5 MG Oral Tab Take 1 tablet (5 mg total) by mouth nightly. 90 tablet 3    folic acid 400 MCG Oral Tab Take 1 tablet (400 mcg total) by mouth daily.      losartan 50 MG Oral Tab Take 1 tablet (50 mg total) by mouth daily.      Accu-Chek Softclix Lancets Does not apply Misc Use to check blood sugars once daily 100 each 0    Blood Glucose  Monitoring Suppl (ACCU-CHEK GUIDE) w/Device Does not apply Kit Check blood sugar once a day 1 kit 0    Glucose Blood (CONTOUR NEXT TEST) In Vitro Strip Check sugars once a day E 11.65 without insulin use 100 each 0    pantoprazole 40 MG Oral Tab EC Take 1 tablet (40 mg total) by mouth daily. 90 tablet 1    Hydroquinone 4 % External Cream Apply 1 Application. topically 2 (two) times daily. Use bid as directed to hands 30 g 1    erythromycin 5 MG/GM Ophthalmic Ointment Use bid to cyst at corner of right eye 0.5 g 2    Cholecalciferol (VITAMIN D) 50 MCG (2000 UT) Oral Tab Take 1 tablet by mouth daily.      clopidogrel 75 MG Oral Tab Take 1 tablet (75 mg total) by mouth daily. 30 tablet 2    aspirin 81 MG Oral Chew Tab Chew 1 tablet (81 mg total) by mouth daily.       Allergies:  Allergies   Allergen Reactions    Lisinopril TONGUE SWELLING      PHYSICAL EXAM:   Physical Exam  Constitutional:       Appearance: Normal appearance. She is well-developed.   HENT:      Head: Normocephalic.      Right Ear: Tympanic membrane normal.      Left Ear: Tympanic membrane normal.      Nose: Nose normal.      Mouth/Throat:      Mouth: Mucous membranes are moist.      Pharynx: No oropharyngeal exudate or posterior oropharyngeal erythema.   Eyes:      General:         Right eye: No discharge.         Left eye: No discharge.      Pupils: Pupils are equal, round, and reactive to light.   Cardiovascular:      Rate and Rhythm: Normal rate and regular rhythm.      Heart sounds: Normal heart sounds. No murmur heard.     No friction rub. No gallop.   Pulmonary:      Effort: Pulmonary effort is normal. No respiratory distress.      Breath sounds: Normal breath sounds. No wheezing, rhonchi or rales.   Abdominal:      General: Bowel sounds are normal. There is no distension.      Palpations: Abdomen is soft. There is no mass.      Tenderness: There is no abdominal tenderness. There is no right CVA tenderness, left CVA tenderness or guarding.    Musculoskeletal:         General: No tenderness.      Cervical back: Normal range of motion and neck supple. No tenderness.      Right lower leg: No edema.      Left lower leg: No edema.   Lymphadenopathy:      Cervical: No cervical adenopathy.   Skin:     General: Skin is warm and dry.      Findings: No rash.   Neurological:      Mental Status: She is alert and oriented to person, place, and time.      Coordination: Coordination normal.      Gait: Gait normal.   Psychiatric:         Mood and Affect: Mood normal.         Behavior: Behavior normal.         Thought Content: Thought content normal.         Judgment: Judgment normal.       /80   Pulse 80   Ht 5' 3.5\" (1.613 m)   Wt 197 lb (89.4 kg)   BMI 34.35 kg/m²   Wt Readings from Last 2 Encounters:   03/06/24 197 lb (89.4 kg)   02/21/24 197 lb (89.4 kg)     Body mass index is 34.35 kg/m².(2)  Lab Results   Component Value Date    WBC 6.9 02/18/2024    RBC 3.41 (L) 02/18/2024    HGB 13.5 02/18/2024    HCT 39.4 02/18/2024    .5 (H) 02/18/2024    MCH 39.6 (H) 02/18/2024    MCHC 34.3 02/18/2024    RDW 13.7 02/18/2024    .0 02/18/2024      Lab Results   Component Value Date     (H) 02/18/2024    BUN 11 02/18/2024    BUNCREA 12.8 01/30/2024    CREATSERUM 0.88 02/18/2024    ANIONGAP 8 02/18/2024    GFRNAA 76 07/18/2022    GFRAA 88 07/18/2022    CA 9.9 02/18/2024    OSMOCALC 296 (H) 01/30/2024    ALKPHO 71 12/11/2023    AST 19 12/11/2023    ALT 11 12/11/2023    BILT 0.5 12/11/2023    TP 7.0 12/11/2023    ALB 3.9 12/11/2023    GLOBULIN 3.1 12/11/2023     02/18/2024    K 3.6 02/18/2024     02/18/2024    CO2 26.0 02/18/2024      Lab Results   Component Value Date     (H) 01/30/2024    A1C 6.1 (H) 01/30/2024      Lab Results   Component Value Date    CHOLEST 175 01/30/2024    TRIG 163 (H) 01/30/2024    HDL 57 01/30/2024    LDL 90 01/30/2024    VLDL 26 01/30/2024    NONHDLC 118 01/30/2024      Lab Results   Component Value  Date    TSH 2.132 01/30/2024                ASSESSMENT/PLAN:     Problem List Items Addressed This Visit       Hypertension     .Blood pressure 140/80, pulse 80, height 5' 3.5\" (1.613 m), weight 197 lb (89.4 kg).   Not at Goal      Plan     She is on Losartan 50mg po daily  Metoprolol 100 mg daily  Will increase amlodipine 10mg po daily         Relevant Medications    amLODIPine 10 MG Oral Tab     Other Visit Diagnoses       Screening for osteoporosis    -  Primary    Relevant Orders    XR DEXA BONE DENSITOMETRY (CPT=77080)    Screening for colon cancer        Relevant Orders    Occult Blood, Fecal, FIT Immunoassay           I    Orders Placed This Encounter   Procedures    Occult Blood, Fecal, FIT Immunoassay       Meds This Visit:  Requested Prescriptions     Signed Prescriptions Disp Refills    amLODIPine 10 MG Oral Tab 90 tablet 0     Sig: Take 1 tablet (10 mg total) by mouth daily.       Imaging & Referrals:  XR DEXA BONE DENSITOMETRY (EJP=57809)         GERARDO Quinonez

## 2024-03-06 NOTE — ASSESSMENT & PLAN NOTE
.Blood pressure 140/80, pulse 80, height 5' 3.5\" (1.613 m), weight 197 lb (89.4 kg).   Not at Goal      Plan     She is on Losartan 50mg po daily  Metoprolol 100 mg daily  Will increase amlodipine 10mg po daily

## 2024-03-11 ENCOUNTER — TELEPHONE (OUTPATIENT)
Dept: ENDOCRINOLOGY CLINIC | Facility: CLINIC | Age: 79
End: 2024-03-11

## 2024-03-11 RX ORDER — BLOOD SUGAR DIAGNOSTIC
STRIP MISCELLANEOUS
Qty: 100 STRIP | Refills: 0 | Status: SHIPPED | OUTPATIENT
Start: 2024-03-11

## 2024-03-18 ENCOUNTER — OFFICE VISIT (OUTPATIENT)
Dept: ENDOCRINOLOGY CLINIC | Facility: CLINIC | Age: 79
End: 2024-03-18
Payer: MEDICARE

## 2024-03-18 VITALS
SYSTOLIC BLOOD PRESSURE: 142 MMHG | HEART RATE: 71 BPM | BODY MASS INDEX: 34.3 KG/M2 | DIASTOLIC BLOOD PRESSURE: 76 MMHG | HEIGHT: 63.5 IN | WEIGHT: 196 LBS

## 2024-03-18 DIAGNOSIS — E78.5 DYSLIPIDEMIA: ICD-10-CM

## 2024-03-18 DIAGNOSIS — E11.69 TYPE 2 DIABETES MELLITUS WITH OTHER SPECIFIED COMPLICATION, WITHOUT LONG-TERM CURRENT USE OF INSULIN (HCC): Primary | ICD-10-CM

## 2024-03-18 LAB
GLUCOSE BLOOD: 137
TEST STRIP LOT #: NORMAL NUMERIC

## 2024-03-18 PROCEDURE — 82947 ASSAY GLUCOSE BLOOD QUANT: CPT | Performed by: INTERNAL MEDICINE

## 2024-03-18 PROCEDURE — 1160F RVW MEDS BY RX/DR IN RCRD: CPT | Performed by: INTERNAL MEDICINE

## 2024-03-18 PROCEDURE — 3008F BODY MASS INDEX DOCD: CPT | Performed by: INTERNAL MEDICINE

## 2024-03-18 PROCEDURE — 99213 OFFICE O/P EST LOW 20 MIN: CPT | Performed by: INTERNAL MEDICINE

## 2024-03-18 PROCEDURE — 1159F MED LIST DOCD IN RCRD: CPT | Performed by: INTERNAL MEDICINE

## 2024-03-18 PROCEDURE — 3077F SYST BP >= 140 MM HG: CPT | Performed by: INTERNAL MEDICINE

## 2024-03-18 PROCEDURE — 3078F DIAST BP <80 MM HG: CPT | Performed by: INTERNAL MEDICINE

## 2024-03-18 NOTE — PROGRESS NOTES
FU    CHIEF COMPLAINT:    Chief Complaint   Patient presents with    Diabetes     Pt is in for a Diabetes follow up. Glucose check        HISTORY OF PRESENT ILLNESS:   Corrine Keen is a 78 year old female who is here to FU  for DM.     DM HISTORY  Diagnosed: around age 71      HISTORY OF DIABETES COMPLICATIONS: :  History of Retinopathy: denies - last eye exam: Summer, 2023, Dr. Thomson  History of Neuropathy: denies  History of Nephropathy: Last Ur MA was normal    ASSOCIATED COMPLICATIONS:   HTN: yes  Hyperlipidemia: yes  Coronary Artery Disease:  yes  Cerebrovascular Disease:denies      HOME GLUCOSE READINGS:   Checks BG daily  Fasting : 110-140      No low BG under 70    CURRENT DIABETIC MEDICATIONS INCLUDE:  MTF 1000 mg BID  tradjenta 5 mg daily      MEALS:  Dietary compliance is good    EXERCISE:  Stays active      CURRENT MEDICATIONS:    Current Outpatient Medications   Medication Sig Dispense Refill    metFORMIN HCl 1000 MG Oral Tab Take 1 tablet (1,000 mg total) by mouth 2 (two) times daily. 180 tablet 3    linaGLIPtin (TRADJENTA) 5 mg Oral Tab Take 1 tablet (5 mg total) by mouth daily. 90 tablet 1    Glucose Blood (ACCU-CHEK GUIDE) In Vitro Strip Check blood sugar once a day 100 strip 0    amLODIPine 10 MG Oral Tab Take 1 tablet (10 mg total) by mouth daily. 90 tablet 0    metoprolol succinate ER (TOPROL XL) 100 MG Oral Tablet 24 Hr Take 1 tablet (100 mg total) by mouth daily. 90 tablet 1    hydroxyurea 500 MG Oral Cap TAKE 2 CAPSULES IN THE MORNING, AND 1 CAPSULE IN THE EVENING FOR A TOTAL OF 1500 MG DAILY. 270 capsule 5    COD LIVER OIL OR Take by mouth.      rosuvastatin 5 MG Oral Tab Take 1 tablet (5 mg total) by mouth nightly. 90 tablet 3    folic acid 400 MCG Oral Tab Take 1 tablet (400 mcg total) by mouth daily.      losartan 50 MG Oral Tab Take 1 tablet (50 mg total) by mouth daily.      Accu-Chek Softclix Lancets Does not apply Misc Use to check blood sugars once daily 100 each 0    Blood Glucose  Monitoring Suppl (ACCU-CHEK GUIDE) w/Device Does not apply Kit Check blood sugar once a day 1 kit 0    Glucose Blood (CONTOUR NEXT TEST) In Vitro Strip Check sugars once a day E 11.65 without insulin use 100 each 0    pantoprazole 40 MG Oral Tab EC Take 1 tablet (40 mg total) by mouth daily. 90 tablet 1    Hydroquinone 4 % External Cream Apply 1 Application. topically 2 (two) times daily. Use bid as directed to hands 30 g 1    erythromycin 5 MG/GM Ophthalmic Ointment Use bid to cyst at corner of right eye 0.5 g 2    Cholecalciferol (VITAMIN D) 50 MCG (2000 UT) Oral Tab Take 1 tablet by mouth daily.      clopidogrel 75 MG Oral Tab Take 1 tablet (75 mg total) by mouth daily. 30 tablet 2    aspirin 81 MG Oral Chew Tab Chew 1 tablet (81 mg total) by mouth daily.         PAST MEDICAL HISTORY:   Past Medical History:   Diagnosis Date    Diabetes (HCC)     Elevated troponin 12/27/2022    Hypertension     Polycythemia vera (HCC)        PAST SURGICAL HISTORY:   Past Surgical History:   Procedure Laterality Date    CATARACT EXTRACTION  2019    Right eye     DILATION/CURETTAGE,DIAGNOSTIC      DAYANNA BIOPSY STEREO NODULE 1 SITE RIGHT (CPT=19081)      2015? results benign       ALLERGIES:  Allergies   Allergen Reactions    Lisinopril TONGUE SWELLING       SOCIAL HISTORY:    Social History     Socioeconomic History    Marital status: Single   Tobacco Use    Smoking status: Never    Smokeless tobacco: Never   Vaping Use    Vaping Use: Never used   Substance and Sexual Activity    Alcohol use: Not Currently     Comment: socially drinks     Drug use: Never    Sexual activity: Yes   Other Topics Concern    Reaction to local anesthetic No    Pt has a pacemaker No    Pt has a defibrillator No       FAMILY HISTORY:   Family History   Problem Relation Age of Onset    Hypertension Mother     Hypertension Father     Breast Cancer Sister         40's    Breast Cancer Sister 75    Breast Cancer Sister         30's       ASSESSMENTS:         REVIEW OF SYSTEMS:  Constitutional: Negative for: weight change, fever, fatigue, cold/heat intolerance  Eyes: Negative for:  Visual changes, proptosis, blurring, floaters, poor night vision, impaired color vision  ENT: Negative for:  dysphagia, neck swelling, dysphonia  Respiratory: Negative for:  dyspnea, cough  Cardiovascular: Negative for:  chest pain, palpitations, orthopnea  GI: Negative for:  abdominal pain, nausea, vomiting, diarrhea, constipation, bleeding  Neurology: Negative for: headache, numbness, weakness,   Genito-Urinary: Negative for: dysuria, frequency  Psychiatric: Negative for:  depression, anxiety  Hematology/Lymphatics: Negative for: bruising, lower extremity edema  Endocrine: Negative for: polyuria, polydypsia  Skin: Negative for: rash, blister,      PHYSICAL EXAM:   Vitals:    03/18/24 1110   BP: 155/84   Pulse: 68   Weight: 196 lb (88.9 kg)   Height: 5' 3.5\" (1.613 m)     BMI: Body mass index is 34.17 kg/m².         General Appearance:  alert, well developed, in no acute distress  Head: Atraumatic  Eyes:  normal conjunctivae, sclera., normal sclera and normal pupils  Throat/Neck: normal sound to voice. Normal hearing, normal speech  Respiratory:  Speaking in full sentences, non-labored. no increased work of breathing, no audible wheezing    Neuro: motor grossly intact, moving all extremities without difficulty  Psychiatric:  oriented to time, self, and place  Extremities: Sep 2023  Bilateral barefoot skin diabetic exam is normal, visualized feet and the appearance is normal.  Bilateral monofilament/sensation of both feet is normal.  Pulsation pedal pulse exam of both lower legs/feet is normal as well.          DATA:     Pertinent data reviewed    ASSESSMENT AND PLAN:    1. DM: a1c is 6.1 % ( 3/2024)       Plan:  Discussed the pathogenesis, natural course of diabetes. Patient understands the importance of glycemic control and the implications of uncontrolled diabetes including Diabetic  ketoacidosis and various micro vascular and macrovascular complications.        a). Medications:    C/w MTF 1000 mg BF and dinner   No GI SE    STOP linagliptin    Check and call with Bg as discussed  Can alternate timings  Call if Bg go up  pre meal over 180    b). Urine microalbumin /creatinine ordered  c). Discussed importance of annual eye exams  d). Foot exam: Daily feet exam explained  e). BG log maintainence explained in great detail, to get log and glucometer on next visit.  f). Life style changes reviewed  g). Hypoglycemia recognition and management discussed    2. Patient’s BP is  normal today  3. Dyslipidemia  Reports moderate compliance with statin: discussed importance of compliance  A) Discussed lifestyle modifications including reductions in dietary total and saturated fat, weight loss, aerobic exercise, and eating a diet rich in fruits and vegetables.  B) c/w Statin     Patient verbalized a complete  understanding of all of the above and did not have any further questions.     RTC in 5-6 months      Orders Placed This Encounter   Procedures    POC HemoCue Glucose 201 (Finger stick glucose)         Tierra Jain MD

## 2024-03-19 ENCOUNTER — TELEPHONE (OUTPATIENT)
Dept: CASE MANAGEMENT | Age: 79
End: 2024-03-19

## 2024-03-19 DIAGNOSIS — I25.10 CORONARY ARTERY DISEASE, UNSPECIFIED VESSEL OR LESION TYPE, UNSPECIFIED WHETHER ANGINA PRESENT, UNSPECIFIED WHETHER NATIVE OR TRANSPLANTED HEART: Primary | ICD-10-CM

## 2024-03-19 NOTE — TELEPHONE ENCOUNTER
Dr. Steen,     Patient called requesting referral to Dr. Russo for appointment 3/20/24.     Pended referral please review diagnosis and sign off if you agree.    Thank you.  Debbie Munoz  [facility]

## 2024-04-08 ENCOUNTER — OFFICE VISIT (OUTPATIENT)
Dept: INTERNAL MEDICINE CLINIC | Facility: CLINIC | Age: 79
End: 2024-04-08
Payer: MEDICARE

## 2024-04-08 VITALS
WEIGHT: 198 LBS | HEART RATE: 70 BPM | DIASTOLIC BLOOD PRESSURE: 72 MMHG | BODY MASS INDEX: 34.65 KG/M2 | HEIGHT: 63.5 IN | SYSTOLIC BLOOD PRESSURE: 110 MMHG

## 2024-04-08 DIAGNOSIS — I10 HYPERTENSION, UNSPECIFIED TYPE: Primary | ICD-10-CM

## 2024-04-08 PROCEDURE — 99213 OFFICE O/P EST LOW 20 MIN: CPT | Performed by: NURSE PRACTITIONER

## 2024-04-08 PROCEDURE — 1160F RVW MEDS BY RX/DR IN RCRD: CPT | Performed by: NURSE PRACTITIONER

## 2024-04-08 PROCEDURE — 3008F BODY MASS INDEX DOCD: CPT | Performed by: NURSE PRACTITIONER

## 2024-04-08 PROCEDURE — 3078F DIAST BP <80 MM HG: CPT | Performed by: NURSE PRACTITIONER

## 2024-04-08 PROCEDURE — 3074F SYST BP LT 130 MM HG: CPT | Performed by: NURSE PRACTITIONER

## 2024-04-08 PROCEDURE — 1159F MED LIST DOCD IN RCRD: CPT | Performed by: NURSE PRACTITIONER

## 2024-04-08 NOTE — PROGRESS NOTES
HPI:    Patient ID: Corrine Keen is a 78 year old female.    HPI Follow up on Blood pressure  78 year old female who I have met in the past.  She is here for a B/P check  She is currently taking her blood pressure at home.  We had increased amlodipine to 10mg at her last visit.    HTN  Blood pressure 110/72, pulse 70, height 5' 3.5\" (1.613 m), weight 198 lb (89.8 kg).        Refuses vaccinations today      Immunization History   Administered Date(s) Administered    Covid-19 Vaccine Pfizer 30 mcg/0.3 ml 01/12/2021, 01/29/2021, 12/30/2021    Covid-19 Vaccine Pfizer Reyes-Sucrose 30 mcg/0.3 ml 08/14/2022    FLU VAC High Dose 65 YRS & Older PRSV Free (87673) 10/21/2022    HIGH DOSE FLU 65 YRS AND OLDER PRSV FREE SINGLE D (79721) FLU CLINIC 12/12/2023    Influenza 10/24/2021       Past Medical History:   Diagnosis Date    Diabetes (HCC)     Elevated troponin 12/27/2022    Hypertension     Polycythemia vera (HCC)       Past Surgical History:   Procedure Laterality Date    Cataract extraction  2019    Right eye     Dilation/curettage,diagnostic      Sara biopsy stereo nodule 1 site right (cpt=19081)      2015? results benign      Social History     Socioeconomic History    Marital status: Single   Tobacco Use    Smoking status: Never    Smokeless tobacco: Never   Vaping Use    Vaping Use: Never used   Substance and Sexual Activity    Alcohol use: Not Currently     Comment: socially drinks     Drug use: Never    Sexual activity: Yes   Other Topics Concern    Reaction to local anesthetic No    Pt has a pacemaker No    Pt has a defibrillator No          Review of Systems   Constitutional:  Negative for chills, fatigue and fever.   HENT:  Negative for congestion, ear discharge, ear pain, facial swelling, hearing loss, postnasal drip, sinus pressure, sore throat and trouble swallowing.    Eyes:  Negative for pain, discharge, redness and visual disturbance.   Respiratory:  Negative for cough, chest tightness, shortness of breath  and wheezing.    Cardiovascular:  Negative for chest pain, palpitations and leg swelling.   Gastrointestinal:  Negative for abdominal distention, abdominal pain, constipation, diarrhea, nausea and vomiting.   Endocrine: Negative for cold intolerance, heat intolerance, polydipsia, polyphagia and polyuria.   Genitourinary:  Negative for difficulty urinating, dysuria, pelvic pain and vaginal bleeding.   Musculoskeletal:  Negative for back pain, gait problem, neck pain and neck stiffness.   Skin:  Negative for color change and rash.   Neurological:  Negative for dizziness, seizures, weakness and headaches.   Psychiatric/Behavioral:  Negative for agitation and sleep disturbance. The patient is not nervous/anxious.               Current Outpatient Medications   Medication Sig Dispense Refill    Glucose Blood (ACCU-CHEK GUIDE) In Vitro Strip Check blood sugar once a day 100 strip 0    amLODIPine 10 MG Oral Tab Take 1 tablet (10 mg total) by mouth daily. 90 tablet 0    metoprolol succinate ER (TOPROL XL) 100 MG Oral Tablet 24 Hr Take 1 tablet (100 mg total) by mouth daily. 90 tablet 1    hydroxyurea 500 MG Oral Cap TAKE 2 CAPSULES IN THE MORNING, AND 1 CAPSULE IN THE EVENING FOR A TOTAL OF 1500 MG DAILY. 270 capsule 5    COD LIVER OIL OR Take by mouth.      metFORMIN HCl 1000 MG Oral Tab Take 1 tablet (1,000 mg total) by mouth 2 (two) times daily. 180 tablet 3    rosuvastatin 5 MG Oral Tab Take 1 tablet (5 mg total) by mouth nightly. 90 tablet 3    folic acid 400 MCG Oral Tab Take 1 tablet (400 mcg total) by mouth daily.      losartan 50 MG Oral Tab Take 1 tablet (50 mg total) by mouth daily.      Accu-Chek Softclix Lancets Does not apply Misc Use to check blood sugars once daily 100 each 0    Blood Glucose Monitoring Suppl (ACCU-CHEK GUIDE) w/Device Does not apply Kit Check blood sugar once a day 1 kit 0    Glucose Blood (CONTOUR NEXT TEST) In Vitro Strip Check sugars once a day E 11.65 without insulin use 100 each 0     pantoprazole 40 MG Oral Tab EC Take 1 tablet (40 mg total) by mouth daily. 90 tablet 1    Hydroquinone 4 % External Cream Apply 1 Application. topically 2 (two) times daily. Use bid as directed to hands 30 g 1    erythromycin 5 MG/GM Ophthalmic Ointment Use bid to cyst at corner of right eye 0.5 g 2    Cholecalciferol (VITAMIN D) 50 MCG (2000 UT) Oral Tab Take 1 tablet by mouth daily.      aspirin 81 MG Oral Chew Tab Chew 1 tablet (81 mg total) by mouth daily.       Allergies:  Allergies   Allergen Reactions    Lisinopril TONGUE SWELLING      PHYSICAL EXAM:   Physical Exam  Constitutional:       Appearance: Normal appearance. She is well-developed.   HENT:      Head: Normocephalic.   Cardiovascular:      Rate and Rhythm: Normal rate and regular rhythm.      Heart sounds: Normal heart sounds. No murmur heard.     No friction rub. No gallop.   Pulmonary:      Effort: Pulmonary effort is normal. No respiratory distress.      Breath sounds: Normal breath sounds. No wheezing, rhonchi or rales.   Abdominal:      General: Bowel sounds are normal. There is no distension.      Palpations: Abdomen is soft. There is no mass.      Tenderness: There is no abdominal tenderness. There is no right CVA tenderness, left CVA tenderness or guarding.   Musculoskeletal:         General: No tenderness.      Cervical back: Normal range of motion and neck supple. No tenderness.      Right lower leg: No edema.      Left lower leg: No edema.   Lymphadenopathy:      Cervical: No cervical adenopathy.   Skin:     General: Skin is warm and dry.      Findings: No rash.   Neurological:      Mental Status: She is alert and oriented to person, place, and time.      Coordination: Coordination normal.      Gait: Gait normal.   Psychiatric:         Mood and Affect: Mood normal.         Behavior: Behavior normal.         Thought Content: Thought content normal.         Judgment: Judgment normal.       /72   Pulse 70   Ht 5' 3.5\" (1.613 m)   Wt  198 lb (89.8 kg)   BMI 34.52 kg/m²   Wt Readings from Last 2 Encounters:   04/08/24 198 lb (89.8 kg)   03/18/24 196 lb (88.9 kg)     Body mass index is 34.52 kg/m².(2)  Lab Results   Component Value Date    WBC 6.9 02/18/2024    RBC 3.41 (L) 02/18/2024    HGB 13.5 02/18/2024    HCT 39.4 02/18/2024    .5 (H) 02/18/2024    MCH 39.6 (H) 02/18/2024    MCHC 34.3 02/18/2024    RDW 13.7 02/18/2024    .0 02/18/2024      Lab Results   Component Value Date     (H) 02/18/2024    BUN 11 02/18/2024    BUNCREA 12.8 01/30/2024    CREATSERUM 0.88 02/18/2024    ANIONGAP 8 02/18/2024    GFRNAA 76 07/18/2022    GFRAA 88 07/18/2022    CA 9.9 02/18/2024    OSMOCALC 296 (H) 01/30/2024    ALKPHO 71 12/11/2023    AST 19 12/11/2023    ALT 11 12/11/2023    BILT 0.5 12/11/2023    TP 7.0 12/11/2023    ALB 3.9 12/11/2023    GLOBULIN 3.1 12/11/2023     02/18/2024    K 3.6 02/18/2024     02/18/2024    CO2 26.0 02/18/2024      Lab Results   Component Value Date     (H) 01/30/2024    A1C 6.1 (H) 01/30/2024      Lab Results   Component Value Date    CHOLEST 175 01/30/2024    TRIG 163 (H) 01/30/2024    HDL 57 01/30/2024    LDL 90 01/30/2024    VLDL 26 01/30/2024    NONHDLC 118 01/30/2024      Lab Results   Component Value Date    TSH 2.132 01/30/2024                ASSESSMENT/PLAN:     Problem List Items Addressed This Visit       Hypertension - Primary     Blood pressure 110/72, pulse 70, height 5' 3.5\" (1.613 m), weight 198 lb (89.8 kg).   Blood pressure at goal      Plan     She is on Losartan 50mg po daily  Metoprolol  mg daily  Amlodipine 10mg po daily.  Literature reviewed and printed for patient    Controlling High Blood Pressure   High blood pressure (hypertension) is often called the silent killer. This is because many people who have it, don’t know it. It can be very dangerous. High blood pressure can raise your risk of heart attack, stroke, heart disease, and heart failure. Controlling your  blood pressure can lower your risk of these problems. It's important to check yourblood pressure regularly. It can save your life.   Blood pressure measurements are given as 2 numbers. Systolic blood pressure is the upper number. This is the pressure when the heart contracts. Diastolic blood pressure is the lower number. This is the pressure when the heartrelaxes between beats.   Blood pressure is grouped like this:   Normal blood pressure. This is systolic of less than 120 and diastolic of less than 80 (120/80).  Elevated blood pressure.  This is systolic of 120 to 129 and diastolic less than 80.  Stage 1 high blood pressure.  This is systolic of 130 to 139 or diastolic between 80 to 89.  Stage 2 high blood pressure.  This is systolic of 140 or higher or diastolic of 90 or higher.  A heart-healthy lifestyle can help you control your blood pressure withoutmedicines. Below are some things you can do to have a heart-healthy lifestyle.     Eat heart-healthy foods   Choose low-salt, low-fat foods. Limit your sodium to 2,300 mg per day or the amount advised by your healthcare provider.  Limit canned, dried, cured, packaged, and fast foods. These can contain a lot of salt.  Eat 8 to 10 servings of fruits and vegetables every day.  Choose lean meats, fish, or chicken.  Eat whole-grain pasta, brown rice, and beans.  Eat 2 to 3 servings of low-fat or fat-free dairy products.  Ask your doctor about the DASH eating plan. This plan helps reduce blood pressure.  When you go to a restaurant, ask that your meal be made with no added salt.    Stay at a healthy weight   Ask your healthcare provider how many calories to eat a day. Then stick to that number.  Ask your provider what weight range is healthiest for you. If you are overweight, a weight loss of only 3% to 5% of your body weight can help lower blood pressure. A good weight loss goal is to lose 10% of your body weight in a year.  Limit snacks and sweets.  Get regular  exercise.    Get more active   Find activities you enjoy. They can be done alone or with friends or family. Try bicycling, dancing, walking, or jogging.  Park farther away from building entrances to walk more.  Use stairs instead of the elevator.  When you can, walk or bike instead of driving.  [place] leaves, garden, or do household repairs.  Be active at a moderate to vigorous level of physical activity for at least 30 minutes a day for at least 5 days a week.     Manage stress   Make time to relax and enjoy life. Find time to laugh.  Talk about your concerns with your loved ones and your healthcare provider.  Visit with family and friends, and keep up with hobbies.    Limit alcohol and quit smoking   Men should have no more than 2 drinks per day.  Women should have no more than 1 drink per day.  If you smoke, make a plan to stop. Talk with your healthcare provider for help. Smoking greatly raises your risk for heart disease and stroke. Ask your provider about stop-smoking programs and other support.    Blood pressure medicines  If your lifestyle changes aren’t enough, your healthcare provider may prescribe high blood pressure medicine. Take all medicines as prescribed. If you have any questions about yourmedicines, ask your provider before stopping or changing them.   [employer] last reviewed this educational content on12/1/2021 © 2000-2022 [employer]. All rights reserved. This information is not intended as a substitute for professional medical care. Always follow your[facility] Hospitalcare professional's instruction    Video [employer]™  What is High Blood Pressure?  Understand what blood pressure is, the health risks of having high blood pressure, the factors that put you at risk for having high blood pressure, and the importance of working with your healthcare provider to control it.  To watch the video:  Scan the QR code  Using your mobile device, scan the following code:  OR  Go to the  website:  [place]  Enter the prescription code:  3414E    © 4653-6747 [employer]. All rights reserved. This information is not intended as a substitute for professional medical care. Always follow your healthcare professional's instructions.    Video [employer]™  Eating Well with High Blood Pressure  Certain foods can make your blood pressure go too high. Watch and learn how easy it is to have delicious meals without harming your health.     To watch the video:  Scan the QR code  Using your mobile device, scan the following code:  OR  Go to the website:  wwwNovavax  Enter the prescription code:   QIX       © 6059-1697 [employer]. All rights reserved. This information is not intended as a substitute for professional medical care. Always follow your healthcare professional's instructions.    Taking Your Blood Pressure  Blood pressure is the force of blood against the artery wall as it moves from the heart through the blood vessels. You can take your own blood pressure reading using a digital monitor. Take your readings the same each time, usingthe same arm. Take readings as often as your healthcare provider advises.   About blood pressure monitors  Blood pressure monitors are designed for certain ages and cases. You can find monitors for older adults, for pregnant women, and for children. Make sure theone you choose is the right one for your age and situation.   Experts advise an automatic cuff monitor that fits on your upper arm (bicep). The cuff should fit your arm size. A cuff that’s too large or too small won't give an accurate reading. Measure around yourupper arm to find your size.   Monitors that attach to your finger or wrist are not as accurate as monitorsfor your upper arm.   Ask your healthcare provider for help in choosing a monitor. Bring your monitorto your next provider visit if you need help in using it the correct way.   The steps below are  general instructions for using an automatic digitalmonitor.   Step 1. Relax    Take your blood pressure at the same time every day, such as in the morning or evening. Or take it at the time your healthcare provider advises.  Wait at least 30 minutes after smoking, eating, or exercising. Don't drink coffee, tea, soda, or other caffeinated drinks before checking your blood pressure. Use the restroom beforehand.  Sit comfortably at a table with both feet on the floor. Don't cross your legs or feet. Place the monitor near you.  Rest for at least 5 minutes before you begin. Make sure there are no distractions. This includes TV, cell phones, and other electronics. Wait to have conversations with others until after you measure you blood pressure.  Step 2. Wrap the cuff    Place your arm on the table, palm up. Your arm should be at the level of your heart. Wrap the cuff around your upper arm, just above your elbow. It’s best done on bare skin, not over clothing. Most cuffs will show you where the blood vessel in the middle of the arm at the inner side of the elbow (the brachial artery) should line up with the cuff. Look in your monitor's instruction booklet for an illustration. You can also bring your cuff to your healthcare provider and have them show you how to correctly place the cuff.  Step 3. Inflate the cuff    Push the button that starts the pump.  The cuff will tighten, then loosen.  The numbers will change. When they stop changing, your blood pressure reading will appear.  Take 2 or 3 readings 1 minute apart, or as advised by your provider.  Step 4. Write down the results of each reading    Write down your blood pressure numbers for each reading. Note the date and time. Keep your results in 1 place, such as a notebook. Even if your monitor has a built-in memory, keep a hard copy of the readings.  Remove the cuff from your arm. Turn off the machine.  Bring your blood pressure records with you to each provider  visit.  If you start a new blood pressure medicine, note the day you started the new medicine. Also note the day if you change the dose of your medicine. Measure your blood pressure before your take your medicine. This information goes on your blood pressure recording sheet. This will help your provider check how well the medicine changes are working.  Ask your provider what numbers mean that you should call them. Also ask what numbers mean that you should get help right away.  [employer] last reviewed this educational content on12/1/2021 © 2000-2022 [employer]. All rights reserved. This information is not intended as a substitute for professional medical care. Always follow yourhealthcare professional's instructions.                             No orders of the defined types were placed in this encounter.      Meds This Visit:  Requested Prescriptions      No prescriptions requested or ordered in this encounter       Imaging & Referrals:  None         GERARDO Quinonez

## 2024-04-08 NOTE — ASSESSMENT & PLAN NOTE
Blood pressure 110/72, pulse 70, height 5' 3.5\" (1.613 m), weight 198 lb (89.8 kg).   Blood pressure at goal      Plan     She is on Losartan 50mg po daily  Metoprolol  mg daily  Amlodipine 10mg po daily.  Literature reviewed and printed for patient    Controlling High Blood Pressure   High blood pressure (hypertension) is often called the silent killer. This is because many people who have it, don’t know it. It can be very dangerous. High blood pressure can raise your risk of heart attack, stroke, heart disease, and heart failure. Controlling your blood pressure can lower your risk of these problems. It's important to check yourblood pressure regularly. It can save your life.   Blood pressure measurements are given as 2 numbers. Systolic blood pressure is the upper number. This is the pressure when the heart contracts. Diastolic blood pressure is the lower number. This is the pressure when the heartrelaxes between beats.   Blood pressure is grouped like this:   Normal blood pressure. This is systolic of less than 120 and diastolic of less than 80 (120/80).  Elevated blood pressure.  This is systolic of 120 to 129 and diastolic less than 80.  Stage 1 high blood pressure.  This is systolic of 130 to 139 or diastolic between 80 to 89.  Stage 2 high blood pressure.  This is systolic of 140 or higher or diastolic of 90 or higher.  A heart-healthy lifestyle can help you control your blood pressure withoutmedicines. Below are some things you can do to have a heart-healthy lifestyle.     Eat heart-healthy foods   Choose low-salt, low-fat foods. Limit your sodium to 2,300 mg per day or the amount advised by your healthcare provider.  Limit canned, dried, cured, packaged, and fast foods. These can contain a lot of salt.  Eat 8 to 10 servings of fruits and vegetables every day.  Choose lean meats, fish, or chicken.  Eat whole-grain pasta, brown rice, and beans.  Eat 2 to 3 servings of low-fat or fat-free dairy  products.  Ask your doctor about the DASH eating plan. This plan helps reduce blood pressure.  When you go to a restaurant, ask that your meal be made with no added salt.    Stay at a healthy weight   Ask your healthcare provider how many calories to eat a day. Then stick to that number.  Ask your provider what weight range is healthiest for you. If you are overweight, a weight loss of only 3% to 5% of your body weight can help lower blood pressure. A good weight loss goal is to lose 10% of your body weight in a year.  Limit snacks and sweets.  Get regular exercise.    Get more active   Find activities you enjoy. They can be done alone or with friends or family. Try bicycling, dancing, walking, or jogging.  Park farther away from building entrances to walk more.  Use stairs instead of the elevator.  When you can, walk or bike instead of driving.  [place] leaves, garden, or do household repairs.  Be active at a moderate to vigorous level of physical activity for at least 30 minutes a day for at least 5 days a week.     Manage stress   Make time to relax and enjoy life. Find time to laugh.  Talk about your concerns with your loved ones and your healthcare provider.  Visit with family and friends, and keep up with hobbies.    Limit alcohol and quit smoking   Men should have no more than 2 drinks per day.  Women should have no more than 1 drink per day.  If you smoke, make a plan to stop. Talk with your healthcare provider for help. Smoking greatly raises your risk for heart disease and stroke. Ask your provider about stop-smoking programs and other support.    Blood pressure medicines  If your lifestyle changes aren’t enough, your healthcare provider may prescribe high blood pressure medicine. Take all medicines as prescribed. If you have any questions about yourmedicines, ask your provider before stopping or changing them.   [employer] last reviewed this educational content on12/1/2021    © 1490-6866 [employer]  [employer]. All rights reserved. This information is not intended as a substitute for professional medical care. Always follow yourhealthcare professional's instruction    Video [employer]  What is High Blood Pressure?  Understand what blood pressure is, the health risks of having high blood pressure, the factors that put you at risk for having high blood pressure, and the importance of working with your healthcare provider to control it.  To watch the video:  Scan the QR code  Using your mobile device, scan the following code:  OR  Go to the website:  [employer]  Enter the prescription code:  3414E    © 0894-4954 [employer]. All rights reserved. This information is not intended as a substitute for professional medical care. Always follow your healthcare professional's instructions.    Video [employer]™  Eating Well with High Blood Pressure  Certain foods can make your blood pressure go too high. Watch and learn how easy it is to have delicious meals without harming your health.     To watch the video:  Scan the QR code  Using your mobile device, scan the following code:  OR  Go to the website:  [email or web address]  Enter the prescription code:   QIX       © 7749-2121 [employer]. All rights reserved. This information is not intended as a substitute for professional medical care. Always follow your healthcare professional's instructions.    Taking Your Blood Pressure  Blood pressure is the force of blood against the artery wall as it moves from the heart through the blood vessels. You can take your own blood pressure reading using a digital monitor. Take your readings the same each time, usingthe same arm. Take readings as often as your healthcare provider advises.   About blood pressure monitors  Blood pressure monitors are designed for certain ages and cases. You can find monitors for older adults, for pregnant women, and for children. Make sure theone you choose is the  right one for your age and situation.   Experts advise an automatic cuff monitor that fits on your upper arm (bicep). The cuff should fit your arm size. A cuff that’s too large or too small won't give an accurate reading. Measure around yourupper arm to find your size.   Monitors that attach to your finger or wrist are not as accurate as monitorsfor your upper arm.   Ask your healthcare provider for help in choosing a monitor. Bring your monitorto your next provider visit if you need help in using it the correct way.   The steps below are general instructions for using an automatic digitalmonitor.   Step 1. Relax    Take your blood pressure at the same time every day, such as in the morning or evening. Or take it at the time your healthcare provider advises.  Wait at least 30 minutes after smoking, eating, or exercising. Don't drink coffee, tea, soda, or other caffeinated drinks before checking your blood pressure. Use the restroom beforehand.  Sit comfortably at a table with both feet on the floor. Don't cross your legs or feet. Place the monitor near you.  Rest for at least 5 minutes before you begin. Make sure there are no distractions. This includes TV, cell phones, and other electronics. Wait to have conversations with others until after you measure you blood pressure.  Step 2. Wrap the cuff    Place your arm on the table, palm up. Your arm should be at the level of your heart. Wrap the cuff around your upper arm, just above your elbow. It’s best done on bare skin, not over clothing. Most cuffs will show you where the blood vessel in the middle of the arm at the inner side of the elbow (the brachial artery) should line up with the cuff. Look in your monitor's instruction booklet for an illustration. You can also bring your cuff to your healthcare provider and have them show you how to correctly place the cuff.  Step 3. Inflate the cuff    Push the button that starts the pump.  The cuff will tighten, then  loosen.  The numbers will change. When they stop changing, your blood pressure reading will appear.  Take 2 or 3 readings 1 minute apart, or as advised by your provider.  Step 4. Write down the results of each reading    Write down your blood pressure numbers for each reading. Note the date and time. Keep your results in 1 place, such as a notebook. Even if your monitor has a built-in memory, keep a hard copy of the readings.  Remove the cuff from your arm. Turn off the machine.  Bring your blood pressure records with you to each provider visit.  If you start a new blood pressure medicine, note the day you started the new medicine. Also note the day if you change the dose of your medicine. Measure your blood pressure before your take your medicine. This information goes on your blood pressure recording sheet. This will help your provider check how well the medicine changes are working.  Ask your provider what numbers mean that you should call them. Also ask what numbers mean that you should get help right away.  Shayna last reviewed this educational content on12/1/2021 © 2000-2022 [employer]. All rights reserved. This information is not intended as a substitute for professional medical care. Always follow yourhealthcare professional's instructions.

## 2024-04-08 NOTE — PATIENT INSTRUCTIONS
Controlling High Blood Pressure   High blood pressure (hypertension) is often called the silent killer. This is because many people who have it, don’t know it. It can be very dangerous. High blood pressure can raise your risk of heart attack, stroke, heart disease, and heart failure. Controlling your blood pressure can lower your risk of these problems. It's important to check yourblood pressure regularly. It can save your life.   Blood pressure measurements are given as 2 numbers. Systolic blood pressure is the upper number. This is the pressure when the heart contracts. Diastolic blood pressure is the lower number. This is the pressure when the heartrelaxes between beats.   Blood pressure is grouped like this:   Normal blood pressure. This is systolic of less than 120 and diastolic of less than 80 (120/80).  Elevated blood pressure.  This is systolic of 120 to 129 and diastolic less than 80.  Stage 1 high blood pressure.  This is systolic of 130 to 139 or diastolic between 80 to 89.  Stage 2 high blood pressure.  This is systolic of 140 or higher or diastolic of 90 or higher.  A heart-healthy lifestyle can help you control your blood pressure withoutmedicines. Below are some things you can do to have a heart-healthy lifestyle.     Eat heart-healthy foods   Choose low-salt, low-fat foods. Limit your sodium to 2,300 mg per day or the amount advised by your healthcare provider.  Limit canned, dried, cured, packaged, and fast foods. These can contain a lot of salt.  Eat 8 to 10 servings of fruits and vegetables every day.  Choose lean meats, fish, or chicken.  Eat whole-grain pasta, brown rice, and beans.  Eat 2 to 3 servings of low-fat or fat-free dairy products.  Ask your doctor about the DASH eating plan. This plan helps reduce blood pressure.  When you go to a restaurant, ask that your meal be made with no added salt.    Stay at a healthy weight   Ask your healthcare provider how many calories to eat a day. Then  stick to that number.  Ask your provider what weight range is healthiest for you. If you are overweight, a weight loss of only 3% to 5% of your body weight can help lower blood pressure. A good weight loss goal is to lose 10% of your body weight in a year.  Limit snacks and sweets.  Get regular exercise.    Get more active   Find activities you enjoy. They can be done alone or with friends or family. Try bicycling, dancing, walking, or jogging.  Park farther away from building entrances to walk more.  Use stairs instead of the elevator.  When you can, walk or bike instead of driving.  [place] leaves, garden, or do household repairs.  Be active at a moderate to vigorous level of physical activity for at least 30 minutes a day for at least 5 days a week.     Manage stress   Make time to relax and enjoy life. Find time to laugh.  Talk about your concerns with your loved ones and your healthcare provider.  Visit with family and friends, and keep up with hobbies.    Limit alcohol and quit smoking   Men should have no more than 2 drinks per day.  Women should have no more than 1 drink per day.  If you smoke, make a plan to stop. Talk with your healthcare provider for help. Smoking greatly raises your risk for heart disease and stroke. Ask your provider about stop-smoking programs and other support.    Blood pressure medicines  If your lifestyle changes aren’t enough, your healthcare provider may prescribe high blood pressure medicine. Take all medicines as prescribed. If you have any questions about yourmedicines, ask your provider before stopping or changing them.   [employer] last reviewed this educational content on12/1/2021 © 2000-2022 [employer]. All rights reserved. This information is not intended as a substitute for professional medical care. Always follow your[facility] professional's instruction    Video [employer]™  What is High Blood Pressure?  Understand what blood pressure is, the health risks  of having high blood pressure, the factors that put you at risk for having high blood pressure, and the importance of working with your healthcare provider to control it.  To watch the video:  Scan the QR code  Using your mobile device, scan the following code:  OR  Go to the website:  [employer]  Enter the prescription code:  3414E    © 2000-2022 [employer]. All rights reserved. This information is not intended as a substitute for professional medical care. Always follow your healthcare professional's instructions.    Video [email or web address]  Eating Well with High Blood Pressure  Certain foods can make your blood pressure go too high. Watch and learn how easy it is to have delicious meals without harming your health.     To watch the video:  Scan the QR code  Using your mobile device, scan the following code:  OR  Go to the website:  [email or web address]  Enter the prescription code:   QIX       © 2000-2022 [employer]. All rights reserved. This information is not intended as a substitute for professional medical care. Always follow your healthcare professional's instructions.    Taking Your Blood Pressure  Blood pressure is the force of blood against the artery wall as it moves from the heart through the blood vessels. You can take your own blood pressure reading using a digital monitor. Take your readings the same each time, usingthe same arm. Take readings as often as your healthcare provider advises.   About blood pressure monitors  Blood pressure monitors are designed for certain ages and cases. You can find monitors for older adults, for pregnant women, and for children. Make sure theone you choose is the right one for your age and situation.   Experts advise an automatic cuff monitor that fits on your upper arm (bicep). The cuff should fit your arm size. A cuff that’s too large or too small won't give an accurate reading. Measure around yourupper arm to find your  size.   Monitors that attach to your finger or wrist are not as accurate as monitorsfor your upper arm.   Ask your healthcare provider for help in choosing a monitor. Bring your monitorto your next provider visit if you need help in using it the correct way.   The steps below are general instructions for using an automatic digitalmonitor.   Step 1. Relax    Take your blood pressure at the same time every day, such as in the morning or evening. Or take it at the time your healthcare provider advises.  Wait at least 30 minutes after smoking, eating, or exercising. Don't drink coffee, tea, soda, or other caffeinated drinks before checking your blood pressure. Use the restroom beforehand.  Sit comfortably at a table with both feet on the floor. Don't cross your legs or feet. Place the monitor near you.  Rest for at least 5 minutes before you begin. Make sure there are no distractions. This includes TV, cell phones, and other electronics. Wait to have conversations with others until after you measure you blood pressure.  Step 2. Wrap the cuff    Place your arm on the table, palm up. Your arm should be at the level of your heart. Wrap the cuff around your upper arm, just above your elbow. It’s best done on bare skin, not over clothing. Most cuffs will show you where the blood vessel in the middle of the arm at the inner side of the elbow (the brachial artery) should line up with the cuff. Look in your monitor's instruction booklet for an illustration. You can also bring your cuff to your healthcare provider and have them show you how to correctly place the cuff.  Step 3. Inflate the cuff    Push the button that starts the pump.  The cuff will tighten, then loosen.  The numbers will change. When they stop changing, your blood pressure reading will appear.  Take 2 or 3 readings 1 minute apart, or as advised by your provider.  Step 4. Write down the results of each reading    Write down your blood pressure numbers for each  reading. Note the date and time. Keep your results in 1 place, such as a notebook. Even if your monitor has a built-in memory, keep a hard copy of the readings.  Remove the cuff from your arm. Turn off the machine.  Bring your blood pressure records with you to each provider visit.  If you start a new blood pressure medicine, note the day you started the new medicine. Also note the day if you change the dose of your medicine. Measure your blood pressure before your take your medicine. This information goes on your blood pressure recording sheet. This will help your provider check how well the medicine changes are working.  Ask your provider what numbers mean that you should call them. Also ask what numbers mean that you should get help right away.  Shayna last reviewed this educational content on12/1/2021 © 2000-2022 [employer]. All rights reserved. This information is not intended as a substitute for professional medical care. Always follow yourhealthcare professional's instructions.

## 2024-04-17 ENCOUNTER — HOSPITAL ENCOUNTER (OUTPATIENT)
Dept: BONE DENSITY | Facility: HOSPITAL | Age: 79
Discharge: HOME | End: 2024-04-17
Attending: NURSE PRACTITIONER
Payer: MEDICARE

## 2024-04-17 DIAGNOSIS — Z13.820 SCREENING FOR OSTEOPOROSIS: ICD-10-CM

## 2024-04-17 PROCEDURE — 77080 DXA BONE DENSITY AXIAL: CPT | Performed by: NURSE PRACTITIONER

## 2024-05-23 ENCOUNTER — OFFICE VISIT (OUTPATIENT)
Facility: CLINIC | Age: 79
End: 2024-05-23

## 2024-05-23 VITALS
SYSTOLIC BLOOD PRESSURE: 114 MMHG | WEIGHT: 198 LBS | HEART RATE: 81 BPM | OXYGEN SATURATION: 98 % | DIASTOLIC BLOOD PRESSURE: 72 MMHG | HEIGHT: 63.5 IN | BODY MASS INDEX: 34.65 KG/M2

## 2024-05-23 DIAGNOSIS — I25.118 CORONARY ARTERY DISEASE OF NATIVE ARTERY OF NATIVE HEART WITH STABLE ANGINA PECTORIS (HCC): ICD-10-CM

## 2024-05-23 DIAGNOSIS — E11.9 TYPE 2 DIABETES MELLITUS WITHOUT COMPLICATION, WITHOUT LONG-TERM CURRENT USE OF INSULIN (HCC): Primary | ICD-10-CM

## 2024-05-23 DIAGNOSIS — I10 HYPERTENSION, UNSPECIFIED TYPE: ICD-10-CM

## 2024-05-23 PROCEDURE — 1159F MED LIST DOCD IN RCRD: CPT | Performed by: INTERNAL MEDICINE

## 2024-05-23 PROCEDURE — 99214 OFFICE O/P EST MOD 30 MIN: CPT | Performed by: INTERNAL MEDICINE

## 2024-05-23 PROCEDURE — 3008F BODY MASS INDEX DOCD: CPT | Performed by: INTERNAL MEDICINE

## 2024-05-23 PROCEDURE — 3078F DIAST BP <80 MM HG: CPT | Performed by: INTERNAL MEDICINE

## 2024-05-23 PROCEDURE — 1126F AMNT PAIN NOTED NONE PRSNT: CPT | Performed by: INTERNAL MEDICINE

## 2024-05-23 PROCEDURE — 3074F SYST BP LT 130 MM HG: CPT | Performed by: INTERNAL MEDICINE

## 2024-05-23 NOTE — PROGRESS NOTES
Corrine Keen is a 78 year old female.  Chief Complaint   Patient presents with    Follow - Up     HPI:   78-year-old pleasant lady here for follow-up.  She reports that she is doing well.  She denies any chest pain or shortness of breath.  She exercises couple of times a week.  Denies any hypoglycemic events.      Current Outpatient Medications   Medication Sig Dispense Refill    Glucose Blood (ACCU-CHEK GUIDE) In Vitro Strip Check blood sugar once a day 100 strip 0    amLODIPine 10 MG Oral Tab Take 1 tablet (10 mg total) by mouth daily. 90 tablet 0    metoprolol succinate ER (TOPROL XL) 100 MG Oral Tablet 24 Hr Take 1 tablet (100 mg total) by mouth daily. 90 tablet 1    hydroxyurea 500 MG Oral Cap TAKE 2 CAPSULES IN THE MORNING, AND 1 CAPSULE IN THE EVENING FOR A TOTAL OF 1500 MG DAILY. 270 capsule 5    COD LIVER OIL OR Take by mouth.      metFORMIN HCl 1000 MG Oral Tab Take 1 tablet (1,000 mg total) by mouth 2 (two) times daily. 180 tablet 3    rosuvastatin 5 MG Oral Tab Take 1 tablet (5 mg total) by mouth nightly. 90 tablet 3    folic acid 400 MCG Oral Tab Take 1 tablet (400 mcg total) by mouth daily.      losartan 50 MG Oral Tab Take 1 tablet (50 mg total) by mouth daily.      Accu-Chek Softclix Lancets Does not apply Misc Use to check blood sugars once daily 100 each 0    Blood Glucose Monitoring Suppl (ACCU-CHEK GUIDE) w/Device Does not apply Kit Check blood sugar once a day 1 kit 0    Glucose Blood (CONTOUR NEXT TEST) In Vitro Strip Check sugars once a day E 11.65 without insulin use 100 each 0    pantoprazole 40 MG Oral Tab EC Take 1 tablet (40 mg total) by mouth daily. 90 tablet 1    Hydroquinone 4 % External Cream Apply 1 Application. topically 2 (two) times daily. Use bid as directed to hands 30 g 1    erythromycin 5 MG/GM Ophthalmic Ointment Use bid to cyst at corner of right eye 0.5 g 2    Cholecalciferol (VITAMIN D) 50 MCG (2000 UT) Oral Tab Take 1 tablet by mouth daily.      aspirin 81 MG Oral Chew  Tab Chew 1 tablet (81 mg total) by mouth daily.        Past Medical History:    Diabetes (HCC)    Elevated troponin    Hypertension    Polycythemia vera (HCC)      Past Surgical History:   Procedure Laterality Date    Cataract extraction  2019    Right eye     Dilation/curettage,diagnostic      Sara biopsy stereo nodule 1 site right (cpt=19081)      2015? results benign      Social History:  Social History     Socioeconomic History    Marital status: Single   Tobacco Use    Smoking status: Never    Smokeless tobacco: Never   Vaping Use    Vaping status: Never Used   Substance and Sexual Activity    Alcohol use: Not Currently     Comment: socially drinks     Drug use: Never    Sexual activity: Yes   Other Topics Concern    Reaction to local anesthetic No    Pt has a pacemaker No    Pt has a defibrillator No      Family History   Problem Relation Age of Onset    Hypertension Mother     Hypertension Father     Breast Cancer Sister         40's    Breast Cancer Sister 75    Breast Cancer Sister         30's      Allergies   Allergen Reactions    Lisinopril TONGUE SWELLING        REVIEW OF SYSTEMS:   Review of Systems   Review of Systems   Constitutional: Negative for activity change, appetite change and fever.   HENT: Negative for congestion and voice change.    Respiratory: Negative for cough and shortness of breath.    Cardiovascular: Negative for chest pain.   Gastrointestinal: Negative for abdominal distention, abdominal pain and vomiting.   Genitourinary: Negative for hematuria.   Skin: Negative for wound.   Psychiatric/Behavioral: Negative for behavioral problems.   Wt Readings from Last 5 Encounters:   05/23/24 198 lb (89.8 kg)   04/08/24 198 lb (89.8 kg)   03/18/24 196 lb (88.9 kg)   03/06/24 197 lb (89.4 kg)   02/21/24 197 lb (89.4 kg)     Body mass index is 34.52 kg/m².      EXAM:   /72   Pulse 81   Ht 5' 3.5\" (1.613 m)   Wt 198 lb (89.8 kg)   SpO2 98%   BMI 34.52 kg/m²   Physical Exam    Constitutional:       Appearance: Normal appearance.   HENT:      Head: Normocephalic.   Eyes:      Conjunctiva/sclera: Conjunctivae normal.   Cardiovascular:      Rate and Rhythm: Normal rate and regular rhythm.      Heart sounds: Normal heart sounds. No murmur heard.  Pulmonary:      Effort: Pulmonary effort is normal.      Breath sounds: Normal breath sounds. No rhonchi or rales.   Abdominal:      General: Bowel sounds are normal.      Palpations: Abdomen is soft.      Tenderness: There is no abdominal tenderness.   Musculoskeletal:      Cervical back: Neck supple.      Right lower leg: No edema.      Left lower leg: No edema.   Skin:     General: Skin is warm and dry.   Neurological:      General: No focal deficit present.      Mental Status: He is alert and oriented to person, place, and time. Mental status is at baseline.   Psychiatric:         Mood and Affect: Mood normal.         Behavior: Behavior normal.       ASSESSMENT AND PLAN:   1. Type 2 diabetes mellitus without complication, without long-term current use of insulin (HCC)  A1c noted.  She will complete ophthalmology evaluation this week.  Up-to-date on foot exam.  Continue statins.  - Ophthalmology Referral - External    2. Coronary artery disease of native artery of native heart with stable angina pectoris (HCC)  Stable with unremarkable cardiac review systems.  Continue antiplatelet and statins.    3. Hypertension, unspecified type  Blood pressure is optimally controlled now.  We will continue with the current medical regimen.  Will continue to monitor kidney function and thyroid functions.    Plan: Labs reviewed.  Medications reviewed.  Referral printed out for her.  I will see her back in 6 months      The patient indicates understanding of these issues and agrees to the plan.  No follow-ups on file.    This note was prepared using Dragon Medical voice recognition dictation software. As a result errors may occur. When identified these errors  have been corrected. While every attempt is made to correct errors during dictation discrepancies may still exist.

## 2024-06-06 DIAGNOSIS — D45 POLYCYTHEMIA VERA (HCC): Primary | ICD-10-CM

## 2024-06-10 ENCOUNTER — NURSE ONLY (OUTPATIENT)
Dept: HEMATOLOGY/ONCOLOGY | Facility: HOSPITAL | Age: 79
End: 2024-06-10
Attending: INTERNAL MEDICINE
Payer: MEDICARE

## 2024-06-10 VITALS
OXYGEN SATURATION: 97 % | HEIGHT: 63.5 IN | HEART RATE: 76 BPM | WEIGHT: 199 LBS | RESPIRATION RATE: 18 BRPM | BODY MASS INDEX: 34.82 KG/M2 | DIASTOLIC BLOOD PRESSURE: 68 MMHG | SYSTOLIC BLOOD PRESSURE: 140 MMHG | TEMPERATURE: 98 F

## 2024-06-10 DIAGNOSIS — D45 POLYCYTHEMIA VERA (HCC): ICD-10-CM

## 2024-06-10 DIAGNOSIS — Z51.81 ENCOUNTER FOR MEDICATION MONITORING: ICD-10-CM

## 2024-06-10 DIAGNOSIS — D75.839 THROMBOCYTOSIS: ICD-10-CM

## 2024-06-10 DIAGNOSIS — D75.89 MACROCYTOSIS WITHOUT ANEMIA: ICD-10-CM

## 2024-06-10 DIAGNOSIS — D45 POLYCYTHEMIA VERA (HCC): Primary | ICD-10-CM

## 2024-06-10 LAB
ALBUMIN SERPL-MCNC: 4 G/DL (ref 3.2–4.8)
ALBUMIN/GLOB SERPL: 1.3 {RATIO} (ref 1–2)
ALP LIVER SERPL-CCNC: 79 U/L
ALT SERPL-CCNC: 15 U/L
ANION GAP SERPL CALC-SCNC: 5 MMOL/L (ref 0–18)
AST SERPL-CCNC: 20 U/L (ref ?–34)
BASOPHILS # BLD AUTO: 0.06 X10(3) UL (ref 0–0.2)
BASOPHILS NFR BLD AUTO: 0.9 %
BILIRUB SERPL-MCNC: 0.6 MG/DL (ref 0.2–1.1)
BUN BLD-MCNC: 13 MG/DL (ref 9–23)
BUN/CREAT SERPL: 15.5 (ref 10–20)
CALCIUM BLD-MCNC: 9.4 MG/DL (ref 8.7–10.4)
CHLORIDE SERPL-SCNC: 107 MMOL/L (ref 98–112)
CO2 SERPL-SCNC: 28 MMOL/L (ref 21–32)
CREAT BLD-MCNC: 0.84 MG/DL
DEPRECATED HBV CORE AB SER IA-ACNC: 35.4 NG/ML
DEPRECATED RDW RBC AUTO: 59.5 FL (ref 35.1–46.3)
EGFRCR SERPLBLD CKD-EPI 2021: 72 ML/MIN/1.73M2 (ref 60–?)
EOSINOPHIL # BLD AUTO: 0.17 X10(3) UL (ref 0–0.7)
EOSINOPHIL NFR BLD AUTO: 2.5 %
ERYTHROCYTE [DISTWIDTH] IN BLOOD BY AUTOMATED COUNT: 13.2 % (ref 11–15)
FOLATE SERPL-MCNC: >24 NG/ML (ref 5.4–?)
GLOBULIN PLAS-MCNC: 3.1 G/DL (ref 2–3.5)
GLUCOSE BLD-MCNC: 172 MG/DL (ref 70–99)
HCT VFR BLD AUTO: 35.5 %
HGB BLD-MCNC: 12.5 G/DL
IMM GRANULOCYTES # BLD AUTO: 0.06 X10(3) UL (ref 0–1)
IMM GRANULOCYTES NFR BLD: 0.9 %
IRON SATN MFR SERPL: 26 %
IRON SERPL-MCNC: 85 UG/DL
LYMPHOCYTES # BLD AUTO: 1.61 X10(3) UL (ref 1–4)
LYMPHOCYTES NFR BLD AUTO: 24 %
MCH RBC QN AUTO: 42.4 PG (ref 26–34)
MCHC RBC AUTO-ENTMCNC: 35.2 G/DL (ref 31–37)
MCV RBC AUTO: 120.3 FL
MONOCYTES # BLD AUTO: 0.74 X10(3) UL (ref 0.1–1)
MONOCYTES NFR BLD AUTO: 11 %
NEUTROPHILS # BLD AUTO: 4.06 X10 (3) UL (ref 1.5–7.7)
NEUTROPHILS # BLD AUTO: 4.06 X10(3) UL (ref 1.5–7.7)
NEUTROPHILS NFR BLD AUTO: 60.7 %
OSMOLALITY SERPL CALC.SUM OF ELEC: 294 MOSM/KG (ref 275–295)
PLATELET # BLD AUTO: 390 10(3)UL (ref 150–450)
POTASSIUM SERPL-SCNC: 4.4 MMOL/L (ref 3.5–5.1)
PROT SERPL-MCNC: 7.1 G/DL (ref 5.7–8.2)
RBC # BLD AUTO: 2.95 X10(6)UL
SODIUM SERPL-SCNC: 140 MMOL/L (ref 136–145)
TIBC SERPL-MCNC: 323 UG/DL (ref 250–425)
TRANSFERRIN SERPL-MCNC: 217 MG/DL (ref 250–380)
VIT B12 SERPL-MCNC: 444 PG/ML (ref 211–911)
WBC # BLD AUTO: 6.7 X10(3) UL (ref 4–11)

## 2024-06-10 PROCEDURE — 84466 ASSAY OF TRANSFERRIN: CPT

## 2024-06-10 PROCEDURE — 82607 VITAMIN B-12: CPT

## 2024-06-10 PROCEDURE — 83540 ASSAY OF IRON: CPT

## 2024-06-10 PROCEDURE — 80053 COMPREHEN METABOLIC PANEL: CPT

## 2024-06-10 PROCEDURE — 99214 OFFICE O/P EST MOD 30 MIN: CPT | Performed by: INTERNAL MEDICINE

## 2024-06-10 PROCEDURE — 82746 ASSAY OF FOLIC ACID SERUM: CPT

## 2024-06-10 PROCEDURE — 85025 COMPLETE CBC W/AUTO DIFF WBC: CPT

## 2024-06-10 PROCEDURE — 36415 COLL VENOUS BLD VENIPUNCTURE: CPT

## 2024-06-10 PROCEDURE — 82728 ASSAY OF FERRITIN: CPT

## 2024-06-10 NOTE — PROGRESS NOTES
Hematology Oncology Progress Note    Patient Name: Corrine Keen   YOB: 1945   Medical Record Number: [id number]   Attending Physician: Renetta Vicente MD     Chief Complaint:  MPN, JAK2 + PV  hydrea monitoring    Oncologic History:  78 year old seen for leukocytosis and thrombocytosis in the setting of tongue swelling and allergic reaction.  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 3/2023: CAD s/p stent placement    Interval History:  Here for 6 months follow up and hydrea management. Doing well with no new issues. She is chronically fatigue with low energy.   She is compliant with hydrea and tolerating it well. Has skin discoloration of the hands and feet and some nail changes since started hydrea which is unchanged.   No fevers, infections, skin lesions or ulcers. She is on baby aspirin. Blood pressure and glucose are better controlled.   Following with cardiology for CAD. Off plavix.     PMH: DM, HTN  PSH: Prior D&C, cataract, right breast biopsy 2015  Family History: neg for cancer, sister with MDS  Social History: non smoker.     Current Outpatient Medications:     Glucose Blood (ACCU-CHEK GUIDE) In Vitro Strip, Check blood sugar once a day, Disp: 100 strip, Rfl: 0    amLODIPine 10 MG Oral Tab, Take 1 tablet (10 mg total) by mouth daily., Disp: 90 tablet, Rfl: 0    metoprolol succinate ER (TOPROL XL) 100 MG Oral Tablet 24 Hr, Take 1 tablet (100 mg total) by mouth daily., Disp: 90 tablet, Rfl: 1    hydroxyurea 500 MG Oral Cap, TAKE 2 CAPSULES IN THE MORNING, AND 1 CAPSULE IN THE EVENING FOR A TOTAL OF 1500 MG DAILY., Disp: 270 capsule, Rfl: 5    COD LIVER OIL OR, Take by mouth., Disp: , Rfl:     metFORMIN HCl 1000 MG Oral Tab, Take 1 tablet (1,000 mg total) by mouth 2 (two) times daily., Disp: 180 tablet, Rfl: 3    rosuvastatin 5 MG Oral Tab, Take 1 tablet (5 mg total) by mouth nightly., Disp: 90 tablet, Rfl: 3    folic acid 400 MCG Oral Tab, Take 1 tablet (400 mcg total)  by mouth daily., Disp: , Rfl:     losartan 50 MG Oral Tab, Take 1 tablet (50 mg total) by mouth daily., Disp: , Rfl:     Accu-Chek Softclix Lancets Does not apply Misc, Use to check blood sugars once daily, Disp: 100 each, Rfl: 0    Blood Glucose Monitoring Suppl (ACCU-CHEK GUIDE) w/Device Does not apply Kit, Check blood sugar once a day, Disp: 1 kit, Rfl: 0    Glucose Blood (CONTOUR NEXT TEST) In Vitro Strip, Check sugars once a day E 11.65 without insulin use, Disp: 100 each, Rfl: 0    pantoprazole 40 MG Oral Tab EC, Take 1 tablet (40 mg total) by mouth daily., Disp: 90 tablet, Rfl: 1    Hydroquinone 4 % External Cream, Apply 1 Application. topically 2 (two) times daily. Use bid as directed to hands, Disp: 30 g, Rfl: 1    erythromycin 5 MG/GM Ophthalmic Ointment, Use bid to cyst at corner of right eye, Disp: 0.5 g, Rfl: 2    Cholecalciferol (VITAMIN D) 50 MCG (2000 UT) Oral Tab, Take 1 tablet by mouth daily., Disp: , Rfl:     aspirin 81 MG Oral Chew Tab, Chew 1 tablet (81 mg total) by mouth daily., Disp: , Rfl:     Review of Systems:  Oncology specific ROS negative except as per HPI    Vital Signs:  /68 (BP Location: Left arm, Patient Position: Sitting, Cuff Size: adult)   Pulse 76   Temp 97.7 °F (36.5 °C) (Oral)   Resp 18   Ht 1.613 m (5' 3.5\")   Wt 90.3 kg (199 lb)   SpO2 97%   BMI 34.70 kg/m²     Wt Readings from Last 6 Encounters:   06/10/24 90.3 kg (199 lb)   05/23/24 89.8 kg (198 lb)   04/08/24 89.8 kg (198 lb)   03/18/24 88.9 kg (196 lb)   03/06/24 89.4 kg (197 lb)   02/21/24 89.4 kg (197 lb)     General: Patient is alert and oriented x 3, not in acute distress.  Neck: ROM intact. No masses.  Chest: Symmetric expansion, clear bilaterally  Abd: Soft, non tender  Extremities: No edema, cyanosis, or bruising.   Neurological: Motor strength grossly intact, MA4E  Skin: Hyperpigmentation to palms of the hands.   Psych: Appropriate mood and affect      Labs:  Lab Results   Component Value Date    WBC  6.7 06/10/2024    RBC 2.95 (L) 06/10/2024    HGB 12.5 06/10/2024    HCT 35.5 06/10/2024    .3 (H) 06/10/2024    MCH 42.4 (H) 06/10/2024    MCHC 35.2 06/10/2024    RDW 13.2 06/10/2024    .0 06/10/2024     Lab Results   Component Value Date/Time    HGB 12.5 06/10/2024 08:07 AM    HGB 13.5 02/18/2024 04:57 AM    HGB 12.8 12/11/2023 08:04 AM    HGB 12.9 09/11/2023 08:35 AM    HGB 12.3 06/26/2023 12:56 PM    HGB 12.8 03/21/2023 10:06 AM    HGB 12.9 01/02/2023 01:28 AM    HGB 14.4 12/29/2022 06:39 AM    HGB 13.4 12/28/2022 07:34 AM    HGB 12.4 12/27/2022 04:02 PM     Lab Results   Component Value Date     06/10/2024    K 4.4 06/10/2024     06/10/2024    CO2 28.0 06/10/2024    BUN 13 06/10/2024    CREATSERUM 0.84 06/10/2024     (H) 06/10/2024    CA 9.4 06/10/2024    ALKPHO 79 06/10/2024    ALT 15 06/10/2024    AST 20 06/10/2024    BILT 0.6 06/10/2024    ALB 4.0 06/10/2024    TP 7.1 06/10/2024        Impression and Plan:    Polycythemia vera, JAK2 V617  - high risk due to age and CAD.   - continue hydrea same dose 1000mg am, 500mg pm. Labs at goal with normal CBC. No dose adjustment.   - discussed goals of therapy in PV and small risk of transformation to MF and AML.   - continue ASA 81 mg daily. Continue folic acid with hydrea  - macrocytosis is due to hydrea. Continue to monitor    CAD s/p stent, HTN- per cardiology      Return in about 6 months (around 12/10/2024).       Renetta Vicente MD  Hematology Oncology

## 2024-06-24 RX ORDER — BLOOD SUGAR DIAGNOSTIC
STRIP MISCELLANEOUS
Qty: 100 STRIP | Refills: 0 | Status: SHIPPED | OUTPATIENT
Start: 2024-06-24

## 2024-07-07 RX ORDER — ROSUVASTATIN CALCIUM 5 MG/1
5 TABLET, COATED ORAL NIGHTLY
Qty: 90 TABLET | Refills: 1 | Status: SHIPPED | OUTPATIENT
Start: 2024-07-07

## 2024-07-07 NOTE — TELEPHONE ENCOUNTER
Refill passed per [facility] protocol.     Requested Prescriptions   Pending Prescriptions Disp Refills    ROSUVASTATIN 5 MG Oral Tab [Pharmacy Med Name: ROSUVASTATIN CALCIUM 5 MG TAB] 90 tablet 3     Sig: TAKE 1 TABLET BY MOUTH EVERY DAY AT NIGHT       Cholesterol Medication Protocol Passed - 7/3/2024 12:41 AM        Passed - ALT < 80     Lab Results   Component Value Date    ALT 15 06/10/2024             Passed - ALT resulted within past year        Passed - Lipid panel within past 12 months     Lab Results   Component Value Date    CHOLEST 175 01/30/2024    TRIG 163 (H) 01/30/2024    HDL 57 01/30/2024    LDL 90 01/30/2024    VLDL 26 01/30/2024    NONHDLC 118 01/30/2024             Passed - In person appointment or virtual visit in the past 12 mos or appointment in next 3 mos     Recent Outpatient Visits              [facility]    [facility] Hematology [facility]cology Renetta Vicente MD    [facility]    [facility]    [facility] Hematology On[facility]    Nurse Only    1 month ago Type 2 diabetes mellitus without complication, without long-term current use of insulin ([facility])    [facility] Jer Steen MD    Office Visit    3 months ago Hypertension, unspecified type    [facility] Kesha Scott APRN    Office Visit    3 months ago Type 2 diabetes mellitus with other specified complication, without long-term current use of insulin ([facility])    [facility] Tierra Jain MD    Office Visit          Future Appointments         Provider Department Appt Notes    In 2 months Tierra Jain MD [facility] DM f/u    In 3 months Jer Steen MD [facility]     In 5 months [facility] Hematology Oncology  [id number] ([facility])    In 5 months Renetta Vicente MD [facility] Hematology Oncology 6 month f/u. Hydrea monitoring                          Recent Outpatient Visits              3 weeks ago Polycythemia vera ([facility])    [facility] Hematology Oncology Renetta Vicente MD    Office Visit    3 weeks ago Polycythemia vera ([facility])    [facility] Hematology Oncology    Nurse Only    1 month ago Type 2 diabetes mellitus without complication, without long-term current use of insulin ([facility])    [facility] Jer Steen MD    Office Visit    3 months ago Hypertension, unspecified type    [facility] Kesha Scott APRN    Office Visit    3 months ago Type 2 diabetes mellitus with other specified complication, without long-term current use of insulin ([facility])    [facility] Tierra Jain MD    Office Visit             Future Appointments         Provider Department Appt Notes    In 2 months Tierra Jain MD [facility] DM f/u    In 3 months Jer Steen MD [facility]     In 5 months [facility] Hematology Oncology [id number] ([facility])    In 5 months Renetta Vicente MD [facility] Hematology Oncology 6 month f/u. Hydrea monitoring

## 2024-07-29 RX ORDER — METOPROLOL SUCCINATE 100 MG/1
100 TABLET, EXTENDED RELEASE ORAL DAILY
Qty: 90 TABLET | Refills: 3 | Status: SHIPPED | OUTPATIENT
Start: 2024-07-29

## 2024-07-29 NOTE — TELEPHONE ENCOUNTER
Please Review. Protocol Failed; No Protocol   BP Readings from Last 1 Encounters:   06/10/24 140/68     Requested Prescriptions   Pending Prescriptions Disp Refills    METOPROLOL SUCCINATE  MG Oral Tablet 24 Hr [Pharmacy Med Name: METOPROLOL SUCC  MG TAB] 90 tablet 1     Sig: TAKE 1 TABLET BY MOUTH EVERY DAY       Hypertension Medications Protocol Failed - 7/25/2024 12:56 AM        Failed - Last BP reading less than 140/90     BP Readings from Last 1 Encounters:   06/10/24 140/68               Passed - CMP or BMP in past 12 months        Passed - In person appointment or virtual visit in the past 12 mos or appointment in next 3 mos     Recent Outpatient Visits              1 month ago Polycythemia vera ([facility])    [facility] Hematology Oncology Renetta Vicente MD    Office Visit    1 month ago Polycythemia vera ([facility])    [facility] Hematology Oncology    Nurse Only    2 months ago Type 2 diabetes mellitus without complication, without long-term current use of insulin ([facility])    [facility] Jer Steen MD    Office Visit    3 months ago Hypertension, unspecified type    [facility] Kesha Scott APRN    Office Visit    4 months ago Type 2 diabetes mellitus with other specified complication, without long-term current use of insulin ([facility])    [facility] Tierra Jain MD    Office Visit          Future Appointments         Provider Department Appt Notes    In 1 month Tierra Jain MD [facility] DM f/u    In 2 months Jer Steen MD [facility]     In 4 months [facility] Hematology Oncology [id number] ([facility])    In 4 months Renetta Vicente MD [facility] Hematology Oncology 6 month f/u. Hydrea monitoring                     Passed - EGFRCR or GFRAA > 50     GFR Evaluation  EGFRCR: 72 , resulted on 6/10/2024                 Future Appointments         Provider Department Appt Notes    In 1 month Tierra Jain MD [facility] DM f/u    In 2 months Jer Steen MD [facility]     In 4 months [facility] Hematology Oncology [id number] ([facility])    In 4 months Renetta Vicente MD [facility] Hematology Oncology 6 month f/u. Hydrea monitoring          Recent Outpatient Visits              1 month ago Polycythemia vera ([facility])    [facility] Hematology Oncology Renetta Vicente MD    Office Visit    1 month ago Polycythemia vera ([facility])    [facility] Hematology Oncology    Nurse Only    2 months ago Type 2 diabetes mellitus without complication, without long-term current use of insulin ([facility])    [facility] Jer Steen MD    Office Visit    3 months ago Hypertension, unspecified type    [facility] Kesha Scott APRN    Office Visit    4 months ago Type 2 diabetes mellitus with other specified complication, without long-term current use of insulin ([facility])    [facility] Tierra Jain MD    Office Visit

## 2024-07-30 RX ORDER — PANTOPRAZOLE SODIUM 40 MG/1
40 TABLET, DELAYED RELEASE ORAL DAILY
Qty: 90 TABLET | Refills: 3 | Status: SHIPPED | OUTPATIENT
Start: 2024-07-30

## 2024-07-30 NOTE — TELEPHONE ENCOUNTER
Refill Per Protocol     Requested Prescriptions   Pending Prescriptions Disp Refills    pantoprazole 40 MG Oral Tab EC 90 tablet 1     Sig: Take 1 tablet (40 mg total) by mouth daily.       Gastrointestional Medication Protocol Passed - 7/26/2024 12:34 PM        Passed - In person appointment or virtual visit in the past 12 mos or appointment in next 3 mos     Recent Outpatient Visits              1 month ago Polycythemia vera ([facility])    [facility] Hematology Oncology Renetta Vicente MD    Office Visit    1 month ago Polycythemia vera ([facility])    [facility] Hematology Oncology    Nurse Only    2 months ago Type 2 diabetes mellitus without complication, without long-term current use of insulin ([facility])    [facility] Jer Steen MD    Office Visit    3 months ago Hypertension, unspecified type    [facility] Kesha Scott APRN    Office Visit    4 months ago Type 2 diabetes mellitus with other specified complication, without long-term current use of insulin ([facility])    [facility] Tierra Jain MD    Office Visit          Future Appointments         Provider Department Appt Notes    In 1 month Tierra Jain MD [facility] DM f/u    In 2 months Jer Steen MD [facility]     In 4 months [facility] Hematology Oncology [id number] ([facility])    In 4 months Renetta Vicente MD [facility] Hematology Oncology 6 month f/u. Hydrea monitoring                           Future Appointments         Provider Department Appt Notes    In 1 month Tierra Jain MD [facility] DM f/u    In 2 months Jer Steen MD [facility]     In 4 months [facility]  [facility] Hematology Oncology [id number] ([facility])    In 4 months Renetta Vicente MD [facility] Hematology Oncology 6 month f/u. Hydrea monitoring          Recent Outpatient Visits              1 month ago Polycythemia vera ([facility])    [facility] Hematology Oncology Renetta Vicente MD    Office Visit    1 month ago Polycythemia vera ([facility])    [facility] Hematology Oncology    Nurse Only    2 months ago Type 2 diabetes mellitus without complication, without long-term current use of insulin ([facility])    [facility] Jer Steen MD    Office Visit    3 months ago Hypertension, unspecified type    [facility] Kesha Scott APRN    Office Visit    4 months ago Type 2 diabetes mellitus with other specified complication, without long-term current use of insulin ([facility])    [facility] Tierra Jain MD    Office Visit

## 2024-09-18 ENCOUNTER — OFFICE VISIT (OUTPATIENT)
Dept: ENDOCRINOLOGY CLINIC | Facility: CLINIC | Age: 79
End: 2024-09-18
Payer: MEDICARE

## 2024-09-18 ENCOUNTER — TELEPHONE (OUTPATIENT)
Dept: ENDOCRINOLOGY CLINIC | Facility: CLINIC | Age: 79
End: 2024-09-18

## 2024-09-18 VITALS — HEIGHT: 62 IN | WEIGHT: 199 LBS | BODY MASS INDEX: 36.62 KG/M2

## 2024-09-18 DIAGNOSIS — E11.69 TYPE 2 DIABETES MELLITUS WITH OTHER SPECIFIED COMPLICATION, UNSPECIFIED WHETHER LONG TERM INSULIN USE (HCC): ICD-10-CM

## 2024-09-18 DIAGNOSIS — E11.9 TYPE 2 DIABETES MELLITUS WITHOUT COMPLICATION, WITHOUT LONG-TERM CURRENT USE OF INSULIN (HCC): Primary | ICD-10-CM

## 2024-09-18 DIAGNOSIS — E78.5 DYSLIPIDEMIA: Primary | ICD-10-CM

## 2024-09-18 LAB
GLUCOSE BLOOD: 147
HEMOGLOBIN A1C: 8.3 % (ref 4.3–5.6)

## 2024-09-18 PROCEDURE — 1159F MED LIST DOCD IN RCRD: CPT | Performed by: INTERNAL MEDICINE

## 2024-09-18 PROCEDURE — 83036 HEMOGLOBIN GLYCOSYLATED A1C: CPT | Performed by: INTERNAL MEDICINE

## 2024-09-18 PROCEDURE — 82947 ASSAY GLUCOSE BLOOD QUANT: CPT | Performed by: INTERNAL MEDICINE

## 2024-09-18 PROCEDURE — 3008F BODY MASS INDEX DOCD: CPT | Performed by: INTERNAL MEDICINE

## 2024-09-18 PROCEDURE — 99214 OFFICE O/P EST MOD 30 MIN: CPT | Performed by: INTERNAL MEDICINE

## 2024-09-18 PROCEDURE — 1160F RVW MEDS BY RX/DR IN RCRD: CPT | Performed by: INTERNAL MEDICINE

## 2024-09-18 NOTE — TELEPHONE ENCOUNTER
Pt has a follow up appt with Dr Tierra Jain in Endocrinology today 09/18/2024. She needs a referral with additional visits. Please enter referral.

## 2024-09-18 NOTE — PROGRESS NOTES
FU    CHIEF COMPLAINT:    Chief Complaint   Patient presents with    Diabetes     Diabetes and glucose check         HISTORY OF PRESENT ILLNESS:   Corrine Keen is a 79 year old female who is here to FU  for DM.     DM HISTORY  Diagnosed: around age 71      HISTORY OF DIABETES COMPLICATIONS: :  History of Retinopathy: denies - last eye exam: Summer, 2024, Dr. Thomson  History of Neuropathy: denies  History of Nephropathy: Last Ur MA was normal    ASSOCIATED COMPLICATIONS:   HTN: yes  Hyperlipidemia: yes  Coronary Artery Disease:  yes  Cerebrovascular Disease:denies      HOME GLUCOSE READINGS:   Checks BG daily  Fasting : 150-180      No low BG under 70    CURRENT DIABETIC MEDICATIONS INCLUDE:  MTF 1000 mg BID        MEALS:  Dietary compliance is moderate    EXERCISE:  Stays active      CURRENT MEDICATIONS:    Current Outpatient Medications   Medication Sig Dispense Refill    BITTER MELON OR Take by mouth.      linaGLIPtin 5 mg Oral Tab Take 1 tablet (5 mg total) by mouth daily. 90 tablet 0    pantoprazole 40 MG Oral Tab EC Take 1 tablet (40 mg total) by mouth daily. 90 tablet 3    metoprolol succinate  MG Oral Tablet 24 Hr Take 1 tablet (100 mg total) by mouth daily. 90 tablet 3    rosuvastatin 5 MG Oral Tab Take 1 tablet (5 mg total) by mouth nightly. 90 tablet 1    ACCU-CHEK GUIDE In Vitro Strip CHECK BLOOD SUGAR ONCE A  strip 0    amLODIPine 10 MG Oral Tab Take 1 tablet (10 mg total) by mouth daily. 90 tablet 0    hydroxyurea 500 MG Oral Cap TAKE 2 CAPSULES IN THE MORNING, AND 1 CAPSULE IN THE EVENING FOR A TOTAL OF 1500 MG DAILY. 270 capsule 5    COD LIVER OIL OR Take by mouth.      metFORMIN HCl 1000 MG Oral Tab Take 1 tablet (1,000 mg total) by mouth 2 (two) times daily. 180 tablet 3    folic acid 400 MCG Oral Tab Take 1 tablet (400 mcg total) by mouth daily.      losartan 50 MG Oral Tab Take 1 tablet (50 mg total) by mouth daily.      Accu-Chek Softclix Lancets Does not apply Misc Use to check  blood sugars once daily 100 each 0    Glucose Blood (CONTOUR NEXT TEST) In Vitro Strip Check sugars once a day E 11.65 without insulin use 100 each 0    Hydroquinone 4 % External Cream Apply 1 Application. topically 2 (two) times daily. Use bid as directed to hands 30 g 1    erythromycin 5 MG/GM Ophthalmic Ointment Use bid to cyst at corner of right eye 0.5 g 2    Cholecalciferol (VITAMIN D) 50 MCG (2000 UT) Oral Tab Take 1 tablet by mouth daily.      aspirin 81 MG Oral Chew Tab Chew 1 tablet (81 mg total) by mouth daily.      Blood Glucose Monitoring Suppl (ACCU-CHEK GUIDE) w/Device Does not apply Kit Check blood sugar once a day 1 kit 0       PAST MEDICAL HISTORY:   Past Medical History:    Diabetes (HCC)    Elevated troponin    Hypertension    Polycythemia vera (HCC)       PAST SURGICAL HISTORY:   Past Surgical History:   Procedure Laterality Date    Cataract extraction  2019    Right eye     Dilation/curettage,diagnostic      Sara biopsy stereo nodule 1 site right (cpt=19081)      2015? results benign       ALLERGIES:  Allergies   Allergen Reactions    Lisinopril TONGUE SWELLING       SOCIAL HISTORY:    Social History     Socioeconomic History    Marital status: Single   Tobacco Use    Smoking status: Never    Smokeless tobacco: Never   Vaping Use    Vaping status: Never Used   Substance and Sexual Activity    Alcohol use: Not Currently     Comment: socially drinks     Drug use: Never    Sexual activity: Yes   Other Topics Concern    Reaction to local anesthetic No    Pt has a pacemaker No    Pt has a defibrillator No       FAMILY HISTORY:   Family History   Problem Relation Age of Onset    Hypertension Mother     Hypertension Father     Breast Cancer Sister         40's    Breast Cancer Sister 75    Breast Cancer Sister         30's       ASSESSMENTS:        REVIEW OF SYSTEMS:  Constitutional: Negative for: weight change, fever, fatigue, cold/heat intolerance  Eyes: Negative for:  Visual changes, proptosis,  blurring, floaters, poor night vision, impaired color vision  ENT: Negative for:  dysphagia, neck swelling, dysphonia  Respiratory: Negative for:  dyspnea, cough  Cardiovascular: Negative for:  chest pain, palpitations, orthopnea  GI: Negative for:  abdominal pain, nausea, vomiting, diarrhea, constipation, bleeding  Neurology: Negative for: headache, numbness, weakness,   Genito-Urinary: Negative for: dysuria, frequency  Psychiatric: Negative for:  depression, anxiety  Hematology/Lymphatics: Negative for: bruising, lower extremity edema  Endocrine: Negative for: polyuria, polydypsia  Skin: Negative for: rash, blister,      PHYSICAL EXAM:   Vitals:    09/18/24 0912   Weight: 199 lb (90.3 kg)   Height: 5' 2\" (1.575 m)     BMI: Body mass index is 36.4 kg/m².         General Appearance:  alert, well developed, in no acute distress  Head: Atraumatic  Eyes:  normal conjunctivae, sclera., normal sclera and normal pupils  Throat/Neck: normal sound to voice. Normal hearing, normal speech  Respiratory:  Speaking in full sentences, non-labored. no increased work of breathing, no audible wheezing    Neuro: motor grossly intact, moving all extremities without difficulty  Psychiatric:  oriented to time, self, and place  Extremities: Sep 2024  Bilateral barefoot skin diabetic exam is normal, visualized feet and the appearance is normal.  Bilateral monofilament/sensation of both feet is normal.  Pulsation pedal pulse exam of both lower legs/feet is normal as well.          DATA:     Pertinent data reviewed    ASSESSMENT AND PLAN:    1. DM: a1c is 8.3 % ( 9/2024)       Plan:  Discussed the pathogenesis, natural course of diabetes. Patient understands the importance of glycemic control and the implications of uncontrolled diabetes including Diabetic ketoacidosis and various micro vascular and macrovascular complications.        a). Medications:    C/w MTF 1000 mg BF and dinner   No GI SE    START linagliptin 5 mg daily     Check and  call with Bg as discussed  Can alternate timings  Call if Bg go up  pre meal over 180    b). Urine microalbumin /creatinine ordered  c). Discussed importance of annual eye exams  d). Foot exam: Daily feet exam explained  e). BG log maintainence explained in great detail, to get log and glucometer on next visit.  f). Life style changes reviewed  g). Hypoglycemia recognition and management discussed    2. Patient’s BP is  normal today  3. Dyslipidemia  Reports moderate compliance with statin: discussed importance of compliance  A) Discussed lifestyle modifications including reductions in dietary total and saturated fat, weight loss, aerobic exercise, and eating a diet rich in fruits and vegetables.  B) c/w Statin     Patient verbalized a complete  understanding of all of the above and did not have any further questions.     RTC in 4 months  Fasting labs as below, around dec 2024       Orders Placed This Encounter   Procedures    Lipid Panel [E]    Microalb/Creat Ratio, Random Urine [E]         Tierra Jain MD

## 2024-09-30 RX ORDER — BLOOD SUGAR DIAGNOSTIC
STRIP MISCELLANEOUS
Qty: 100 STRIP | Refills: 0 | Status: SHIPPED | OUTPATIENT
Start: 2024-09-30

## 2024-10-21 ENCOUNTER — LAB ENCOUNTER (OUTPATIENT)
Dept: LAB | Facility: HOSPITAL | Age: 79
End: 2024-10-21
Attending: INTERNAL MEDICINE
Payer: MEDICARE

## 2024-10-21 DIAGNOSIS — E78.5 DYSLIPIDEMIA: ICD-10-CM

## 2024-10-21 DIAGNOSIS — E11.69 TYPE 2 DIABETES MELLITUS WITH OTHER SPECIFIED COMPLICATION, UNSPECIFIED WHETHER LONG TERM INSULIN USE (HCC): ICD-10-CM

## 2024-10-21 LAB
CHOLEST SERPL-MCNC: 152 MG/DL (ref ?–200)
CREAT UR-SCNC: 92.3 MG/DL
FASTING PATIENT LIPID ANSWER: YES
HDLC SERPL-MCNC: 52 MG/DL (ref 40–59)
LDLC SERPL CALC-MCNC: 84 MG/DL (ref ?–100)
MICROALBUMIN UR-MCNC: <0.3 MG/DL
NONHDLC SERPL-MCNC: 100 MG/DL (ref ?–130)
TRIGL SERPL-MCNC: 86 MG/DL (ref 30–149)
VLDLC SERPL CALC-MCNC: 14 MG/DL (ref 0–30)

## 2024-10-21 PROCEDURE — 82043 UR ALBUMIN QUANTITATIVE: CPT

## 2024-10-21 PROCEDURE — 80061 LIPID PANEL: CPT

## 2024-10-21 PROCEDURE — 36415 COLL VENOUS BLD VENIPUNCTURE: CPT

## 2024-10-21 PROCEDURE — 82570 ASSAY OF URINE CREATININE: CPT

## 2024-10-24 ENCOUNTER — OFFICE VISIT (OUTPATIENT)
Facility: CLINIC | Age: 79
End: 2024-10-24
Payer: MEDICARE

## 2024-10-24 VITALS
BODY MASS INDEX: 36.99 KG/M2 | WEIGHT: 201 LBS | OXYGEN SATURATION: 98 % | DIASTOLIC BLOOD PRESSURE: 70 MMHG | SYSTOLIC BLOOD PRESSURE: 132 MMHG | HEIGHT: 62 IN | HEART RATE: 86 BPM

## 2024-10-24 DIAGNOSIS — I25.118 CORONARY ARTERY DISEASE OF NATIVE ARTERY OF NATIVE HEART WITH STABLE ANGINA PECTORIS (HCC): Primary | ICD-10-CM

## 2024-10-24 DIAGNOSIS — E78.5 DYSLIPIDEMIA: ICD-10-CM

## 2024-10-24 DIAGNOSIS — I10 HYPERTENSION, UNSPECIFIED TYPE: ICD-10-CM

## 2024-10-24 DIAGNOSIS — M25.512 LEFT SHOULDER PAIN, UNSPECIFIED CHRONICITY: ICD-10-CM

## 2024-10-24 DIAGNOSIS — E11.9 TYPE 2 DIABETES MELLITUS WITHOUT COMPLICATION, WITHOUT LONG-TERM CURRENT USE OF INSULIN (HCC): ICD-10-CM

## 2024-10-24 PROCEDURE — 90662 IIV NO PRSV INCREASED AG IM: CPT | Performed by: INTERNAL MEDICINE

## 2024-10-24 PROCEDURE — 1159F MED LIST DOCD IN RCRD: CPT | Performed by: INTERNAL MEDICINE

## 2024-10-24 PROCEDURE — 3078F DIAST BP <80 MM HG: CPT | Performed by: INTERNAL MEDICINE

## 2024-10-24 PROCEDURE — G0008 ADMIN INFLUENZA VIRUS VAC: HCPCS | Performed by: INTERNAL MEDICINE

## 2024-10-24 PROCEDURE — 3075F SYST BP GE 130 - 139MM HG: CPT | Performed by: INTERNAL MEDICINE

## 2024-10-24 PROCEDURE — 1125F AMNT PAIN NOTED PAIN PRSNT: CPT | Performed by: INTERNAL MEDICINE

## 2024-10-24 PROCEDURE — 3008F BODY MASS INDEX DOCD: CPT | Performed by: INTERNAL MEDICINE

## 2024-10-24 PROCEDURE — 99214 OFFICE O/P EST MOD 30 MIN: CPT | Performed by: INTERNAL MEDICINE

## 2024-10-24 NOTE — PROGRESS NOTES
Corrine Keen is a 79 year old female.  Chief Complaint   Patient presents with    Follow - Up     Pain in left shoulder     HPI:   79-year-old pleasant lady here for follow-up she reports that she is doing well except having pain in the left shoulder with activity.  Denies any trauma or fall.  Denies any hypoglycemic events.  Blood sugars are getting better below.  Denies any chest pain.  Breathing is at baseline.      Current Outpatient Medications   Medication Sig Dispense Refill    ACCU-CHEK GUIDE In Vitro Strip CHECK BLOOD SUGAR ONCE A  strip 0    BITTER MELON OR Take by mouth.      linaGLIPtin 5 mg Oral Tab Take 1 tablet (5 mg total) by mouth daily. 90 tablet 0    pantoprazole 40 MG Oral Tab EC Take 1 tablet (40 mg total) by mouth daily. 90 tablet 3    metoprolol succinate  MG Oral Tablet 24 Hr Take 1 tablet (100 mg total) by mouth daily. 90 tablet 3    rosuvastatin 5 MG Oral Tab Take 1 tablet (5 mg total) by mouth nightly. 90 tablet 1    amLODIPine 10 MG Oral Tab Take 1 tablet (10 mg total) by mouth daily. 90 tablet 0    hydroxyurea 500 MG Oral Cap TAKE 2 CAPSULES IN THE MORNING, AND 1 CAPSULE IN THE EVENING FOR A TOTAL OF 1500 MG DAILY. 270 capsule 5    COD LIVER OIL OR Take by mouth.      metFORMIN HCl 1000 MG Oral Tab Take 1 tablet (1,000 mg total) by mouth 2 (two) times daily. 180 tablet 3    folic acid 400 MCG Oral Tab Take 1 tablet (400 mcg total) by mouth daily.      losartan 50 MG Oral Tab Take 1 tablet (50 mg total) by mouth daily.      Accu-Chek Softclix Lancets Does not apply Misc Use to check blood sugars once daily 100 each 0    Blood Glucose Monitoring Suppl (ACCU-CHEK GUIDE) w/Device Does not apply Kit Check blood sugar once a day 1 kit 0    Glucose Blood (CONTOUR NEXT TEST) In Vitro Strip Check sugars once a day E 11.65 without insulin use 100 each 0    Cholecalciferol (VITAMIN D) 50 MCG (2000 UT) Oral Tab Take 1 tablet by mouth daily.      aspirin 81 MG Oral Chew Tab Chew 1  tablet (81 mg total) by mouth daily.      Hydroquinone 4 % External Cream Apply 1 Application. topically 2 (two) times daily. Use bid as directed to hands (Patient not taking: Reported on 10/24/2024) 30 g 1      Past Medical History:    Diabetes (HCC)    Elevated troponin    Hypertension    Polycythemia vera (HCC)      Past Surgical History:   Procedure Laterality Date    Cataract extraction  2019    Right eye     Dilation/curettage,diagnostic      Sara biopsy stereo nodule 1 site right (cpt=19081)      2015? results benign      Social History:  Social History     Socioeconomic History    Marital status: Single   Tobacco Use    Smoking status: Never    Smokeless tobacco: Never   Vaping Use    Vaping status: Never Used   Substance and Sexual Activity    Alcohol use: Not Currently     Comment: socially drinks     Drug use: Never    Sexual activity: Yes   Other Topics Concern    Reaction to local anesthetic No    Pt has a pacemaker No    Pt has a defibrillator No      Family History   Problem Relation Age of Onset    Hypertension Mother     Hypertension Father     Breast Cancer Sister         40's    Breast Cancer Sister 75    Breast Cancer Sister         30's      Allergies[1]     REVIEW OF SYSTEMS:   Review of Systems   Review of Systems   Constitutional: Negative for activity change, appetite change and fever.   HENT: Negative for congestion and voice change.    Respiratory: Negative for cough and shortness of breath.    Cardiovascular: Negative for chest pain.   Gastrointestinal: Negative for abdominal distention, abdominal pain and vomiting.   Genitourinary: Negative for hematuria.   Skin: Negative for wound.   Psychiatric/Behavioral: Negative for behavioral problems.   Wt Readings from Last 5 Encounters:   10/24/24 201 lb (91.2 kg)   09/18/24 199 lb (90.3 kg)   06/10/24 199 lb (90.3 kg)   05/23/24 198 lb (89.8 kg)   04/08/24 198 lb (89.8 kg)     Body mass index is 36.76 kg/m².      EXAM:   /70   Pulse 86    Ht 5' 2\" (1.575 m)   Wt 201 lb (91.2 kg)   SpO2 98%   BMI 36.76 kg/m²   Physical Exam   Constitutional:       Appearance: Normal appearance.   HENT:      Head: Normocephalic.   Eyes:      Conjunctiva/sclera: Conjunctivae normal.   Cardiovascular:      Rate and Rhythm: Normal rate and regular rhythm.      Heart sounds: Normal heart sounds. No murmur heard.  Pulmonary:      Effort: Pulmonary effort is normal.      Breath sounds: Normal breath sounds. No rhonchi or rales.   Abdominal:      General: Bowel sounds are normal.      Palpations: Abdomen is soft.      Tenderness: There is no abdominal tenderness.   Musculoskeletal:      Cervical back: Neck supple.      Right lower leg: No edema.      Left lower leg: No edema.   Skin:     General: Skin is warm and dry.   Neurological:      General: No focal deficit present.      Mental Status: He is alert and oriented to person, place, and time. Mental status is at baseline.   Psychiatric:         Mood and Affect: Mood normal.         Behavior: Behavior normal.       ASSESSMENT AND PLAN:   1. Coronary artery disease of native artery of native heart with stable angina pectoris (HCC)  Status post PCI-stable with unremarkable cardiac review systems.  Continue aspirin and statins.  Follows with cardiology.    2. Type 2 diabetes mellitus without complication, without long-term current use of insulin (HCC)  Medications were recently adjusted by endocrinology.  Blood sugars are running reportedly in 120 range.  No hypoglycemic events.  Continue statins.  We will try to obtain the eye exam report.  We will recheck hemoglobin A1c in December.  - Hemoglobin A1C    3. Hypertension, unspecified type  Blood pressure is reasonably well-controlled.  Will continue the current medical exam.  Continue to monitor kidney function thyroid functions.    4. Dyslipidemia  Lab Results   Component Value Date    LDL 84 10/21/2024    AST 20 06/10/2024    ALT 15 06/10/2024      Encouraged patient to  eat healthy.  Avoid fat fried foods and increase activity as tolerated.  We will monitor lipid profile and liver function test.      5. Left shoulder pain, unspecified chronicity  Slight restriction in the range of motion of the left shoulder.  Most likely adhesive capsulitis/tendinitis/arthritis.  Will get x-ray of the shoulder.  Try Voltaren gel.  If no improvement, to see orthopedics.  - XR SHOULDER, COMPLETE (MIN 2 VIEWS), LEFT (CPT=73030); Future  - Ortho Referral - [place] ([facility])    Plan: As above.      The patient indicates understanding of these issues and agrees to the plan.  No follow-ups on file.    This note was prepared using Dragon Medical voice recognition dictation software. As a result errors may occur. When identified these errors have been corrected. While every attempt is made to correct errors during dictation discrepancies may still exist.       [1]   Allergies  Allergen Reactions    Lisinopril TONGUE SWELLING

## 2024-10-24 NOTE — PATIENT INSTRUCTIONS
Voltaren gel 2 times a day   XR shoulder   No improvement to see Ortho       Blood test for diabetes 12/12/24

## 2024-10-29 ENCOUNTER — HOSPITAL ENCOUNTER (OUTPATIENT)
Dept: GENERAL RADIOLOGY | Facility: HOSPITAL | Age: 79
Discharge: HOME | End: 2024-10-29
Attending: INTERNAL MEDICINE
Payer: MEDICARE

## 2024-10-29 DIAGNOSIS — M25.512 LEFT SHOULDER PAIN, UNSPECIFIED CHRONICITY: ICD-10-CM

## 2024-10-29 PROCEDURE — 73030 X-RAY EXAM OF SHOULDER: CPT | Performed by: INTERNAL MEDICINE

## 2024-12-10 ENCOUNTER — APPOINTMENT (OUTPATIENT)
Dept: HEMATOLOGY/ONCOLOGY | Facility: HOSPITAL | Age: 79
End: 2024-12-10
Attending: INTERNAL MEDICINE
Payer: MEDICARE

## 2024-12-12 ENCOUNTER — OFFICE VISIT (OUTPATIENT)
Dept: HEMATOLOGY/ONCOLOGY | Facility: HOSPITAL | Age: 79
End: 2024-12-12
Attending: INTERNAL MEDICINE
Payer: MEDICARE

## 2024-12-12 VITALS
SYSTOLIC BLOOD PRESSURE: 148 MMHG | WEIGHT: 199 LBS | RESPIRATION RATE: 18 BRPM | TEMPERATURE: 98 F | OXYGEN SATURATION: 96 % | HEIGHT: 63.5 IN | HEART RATE: 72 BPM | BODY MASS INDEX: 34.82 KG/M2 | DIASTOLIC BLOOD PRESSURE: 75 MMHG

## 2024-12-12 DIAGNOSIS — D45 POLYCYTHEMIA VERA (HCC): Primary | ICD-10-CM

## 2024-12-12 DIAGNOSIS — Z79.64 ENCOUNTER FOR MONITORING OF HYDROXYUREA THERAPY: ICD-10-CM

## 2024-12-12 DIAGNOSIS — D45 POLYCYTHEMIA VERA (HCC): ICD-10-CM

## 2024-12-12 DIAGNOSIS — Z51.81 ENCOUNTER FOR MONITORING OF HYDROXYUREA THERAPY: ICD-10-CM

## 2024-12-12 DIAGNOSIS — D75.89 MACROCYTOSIS WITHOUT ANEMIA: ICD-10-CM

## 2024-12-12 LAB
ALBUMIN SERPL-MCNC: 4.3 G/DL (ref 3.2–4.8)
ALBUMIN/GLOB SERPL: 1.3 {RATIO} (ref 1–2)
ALP LIVER SERPL-CCNC: 85 U/L
ALT SERPL-CCNC: 15 U/L
ANION GAP SERPL CALC-SCNC: 7 MMOL/L (ref 0–18)
AST SERPL-CCNC: 28 U/L (ref ?–34)
BASOPHILS # BLD AUTO: 0.05 X10(3) UL (ref 0–0.2)
BASOPHILS NFR BLD AUTO: 0.7 %
BILIRUB SERPL-MCNC: 0.5 MG/DL (ref 0.2–1.1)
BUN BLD-MCNC: 15 MG/DL (ref 9–23)
BUN/CREAT SERPL: 17.6 (ref 10–20)
CALCIUM BLD-MCNC: 9.9 MG/DL (ref 8.7–10.4)
CHLORIDE SERPL-SCNC: 106 MMOL/L (ref 98–112)
CO2 SERPL-SCNC: 28 MMOL/L (ref 21–32)
CREAT BLD-MCNC: 0.85 MG/DL
DEPRECATED RDW RBC AUTO: 63.2 FL (ref 35.1–46.3)
EGFRCR SERPLBLD CKD-EPI 2021: 70 ML/MIN/1.73M2 (ref 60–?)
EOSINOPHIL # BLD AUTO: 0.15 X10(3) UL (ref 0–0.7)
EOSINOPHIL NFR BLD AUTO: 2.2 %
ERYTHROCYTE [DISTWIDTH] IN BLOOD BY AUTOMATED COUNT: 15 % (ref 11–15)
GLOBULIN PLAS-MCNC: 3.2 G/DL (ref 2–3.5)
GLUCOSE BLD-MCNC: 141 MG/DL (ref 70–99)
HCT VFR BLD AUTO: 40.2 %
HGB BLD-MCNC: 13.8 G/DL
IMM GRANULOCYTES # BLD AUTO: 0.09 X10(3) UL (ref 0–1)
IMM GRANULOCYTES NFR BLD: 1.3 %
LYMPHOCYTES # BLD AUTO: 1.38 X10(3) UL (ref 1–4)
LYMPHOCYTES NFR BLD AUTO: 20.2 %
MCH RBC QN AUTO: 38.9 PG (ref 26–34)
MCHC RBC AUTO-ENTMCNC: 34.3 G/DL (ref 31–37)
MCV RBC AUTO: 113.2 FL
MONOCYTES # BLD AUTO: 0.68 X10(3) UL (ref 0.1–1)
MONOCYTES NFR BLD AUTO: 9.9 %
NEUTROPHILS # BLD AUTO: 4.49 X10 (3) UL (ref 1.5–7.7)
NEUTROPHILS # BLD AUTO: 4.49 X10(3) UL (ref 1.5–7.7)
NEUTROPHILS NFR BLD AUTO: 65.7 %
OSMOLALITY SERPL CALC.SUM OF ELEC: 295 MOSM/KG (ref 275–295)
PLATELET # BLD AUTO: 419 10(3)UL (ref 150–450)
POTASSIUM SERPL-SCNC: 4.5 MMOL/L (ref 3.5–5.1)
PROT SERPL-MCNC: 7.5 G/DL (ref 5.7–8.2)
RBC # BLD AUTO: 3.55 X10(6)UL
SODIUM SERPL-SCNC: 141 MMOL/L (ref 136–145)
WBC # BLD AUTO: 6.8 X10(3) UL (ref 4–11)

## 2024-12-12 PROCEDURE — 85060 BLOOD SMEAR INTERPRETATION: CPT

## 2024-12-12 PROCEDURE — 36415 COLL VENOUS BLD VENIPUNCTURE: CPT

## 2024-12-12 PROCEDURE — G2211 COMPLEX E/M VISIT ADD ON: HCPCS | Performed by: INTERNAL MEDICINE

## 2024-12-12 PROCEDURE — 85025 COMPLETE CBC W/AUTO DIFF WBC: CPT

## 2024-12-12 PROCEDURE — 99214 OFFICE O/P EST MOD 30 MIN: CPT | Performed by: INTERNAL MEDICINE

## 2024-12-12 PROCEDURE — 80053 COMPREHEN METABOLIC PANEL: CPT

## 2024-12-12 RX ORDER — HYDROXYUREA 500 MG/1
CAPSULE ORAL
Qty: 270 CAPSULE | Refills: 5 | Status: SHIPPED | OUTPATIENT
Start: 2024-12-12

## 2024-12-12 NOTE — PROGRESS NOTES
Hematology Oncology Progress Note    Patient Name: Corrine Keen   YOB: 1945   Medical Record Number: [id number]   Attending Physician: Renetta Vicente MD     Date of Visit: 12/12/2024     Chief Complaint:  MPN, JAK2 + PV  hydrea monitoring    Oncologic History:  79 year old seen for leukocytosis and thrombocytosis in the setting of tongue swelling and allergic reaction.  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 3/2023: CAD s/p stent placement    Interval History:  Here for 6 months follow up and hydrea management. She is taking 1000 mg in the morning and 500 mg in the evening and tolerating this dose well.   She is also taking baby aspirin daily with no bleeding issues.   She is chronically fatigue with low energy. Reports increased left shoulder pain recently likely from arthritis.   Has skin discoloration of the hands and feet and some nail changes since started hydrea which is unchanged. Skin is dry.   She denies fevers, infections, skin lesions or ulcers. Blood pressure and glucose are better controlled.     PMH: DM, HTN  PSH: Prior D&C, cataract, right breast biopsy 2015  Family History: neg for cancer, sister with MDS  Social History: non smoker.     Current Outpatient Medications:     ACCU-CHEK GUIDE In Vitro Strip, CHECK BLOOD SUGAR ONCE A DAY, Disp: 100 strip, Rfl: 0    BITTER MELON OR, Take by mouth., Disp: , Rfl:     linaGLIPtin 5 mg Oral Tab, Take 1 tablet (5 mg total) by mouth daily., Disp: 90 tablet, Rfl: 0    pantoprazole 40 MG Oral Tab EC, Take 1 tablet (40 mg total) by mouth daily., Disp: 90 tablet, Rfl: 3    metoprolol succinate  MG Oral Tablet 24 Hr, Take 1 tablet (100 mg total) by mouth daily., Disp: 90 tablet, Rfl: 3    rosuvastatin 5 MG Oral Tab, Take 1 tablet (5 mg total) by mouth nightly., Disp: 90 tablet, Rfl: 1    amLODIPine 10 MG Oral Tab, Take 1 tablet (10 mg total) by mouth daily., Disp: 90 tablet, Rfl: 0    hydroxyurea 500 MG Oral Cap, TAKE  2 CAPSULES IN THE MORNING, AND 1 CAPSULE IN THE EVENING FOR A TOTAL OF 1500 MG DAILY., Disp: 270 capsule, Rfl: 5    COD LIVER OIL OR, Take by mouth., Disp: , Rfl:     metFORMIN HCl 1000 MG Oral Tab, Take 1 tablet (1,000 mg total) by mouth 2 (two) times daily., Disp: 180 tablet, Rfl: 3    folic acid 400 MCG Oral Tab, Take 1 tablet (400 mcg total) by mouth daily., Disp: , Rfl:     losartan 50 MG Oral Tab, Take 1 tablet (50 mg total) by mouth daily., Disp: , Rfl:     Accu-Chek Softclix Lancets Does not apply Misc, Use to check blood sugars once daily, Disp: 100 each, Rfl: 0    Blood Glucose Monitoring Suppl (ACCU-CHEK GUIDE) w/Device Does not apply Kit, Check blood sugar once a day, Disp: 1 kit, Rfl: 0    Glucose Blood (CONTOUR NEXT TEST) In Vitro Strip, Check sugars once a day E 11.65 without insulin use, Disp: 100 each, Rfl: 0    Hydroquinone 4 % External Cream, Apply 1 Application. topically 2 (two) times daily. Use bid as directed to hands, Disp: 30 g, Rfl: 1    Cholecalciferol (VITAMIN D) 50 MCG (2000 UT) Oral Tab, Take 1 tablet by mouth daily., Disp: , Rfl:     aspirin 81 MG Oral Chew Tab, Chew 1 tablet (81 mg total) by mouth daily., Disp: , Rfl:     Review of Systems:  Oncology specific ROS negative except as per HPI    Vital Signs:  /75 (BP Location: Left arm, Patient Position: Sitting, Cuff Size: adult)   Pulse 72   Temp 98.1 °F (36.7 °C) (Oral)   Resp 18   Ht 1.613 m (5' 3.5\")   Wt 90.3 kg (199 lb)   SpO2 96%   BMI 34.70 kg/m²       General: Patient is alert and oriented x 3, not in acute distress.  Neck: ROM intact. No masses.  Chest: Symmetric expansion, clear bilaterally  Abd: Soft, non tender  Extremities: No edema, cyanosis, or bruising.   Neurological: Motor strength grossly intact, MA4E  Skin: dry scaly skin. Hyperpigmentation to palms of the hands.   Psych: Appropriate mood and affect      Labs:  Lab Results   Component Value Date    WBC 6.8 12/12/2024    RBC 3.55 (L) 12/12/2024    HGB  13.8 12/12/2024    HCT 40.2 12/12/2024    .2 (H) 12/12/2024    MCH 38.9 (H) 12/12/2024    MCHC 34.3 12/12/2024    RDW 15.0 12/12/2024    .0 12/12/2024     Lab Results   Component Value Date/Time    HGB 13.8 12/12/2024 09:55 AM    HGB 12.5 06/10/2024 08:07 AM    HGB 13.5 02/18/2024 04:57 AM    HGB 12.8 12/11/2023 08:04 AM    HGB 12.9 09/11/2023 08:35 AM    HGB 12.3 06/26/2023 12:56 PM    HGB 12.8 03/21/2023 10:06 AM    HGB 12.9 01/02/2023 01:28 AM    HGB 14.4 12/29/2022 06:39 AM    HGB 13.4 12/28/2022 07:34 AM     Lab Results   Component Value Date     12/12/2024    K 4.5 12/12/2024     12/12/2024    CO2 28.0 12/12/2024    BUN 15 12/12/2024    CREATSERUM 0.85 12/12/2024     (H) 12/12/2024    CA 9.9 12/12/2024    ALKPHO 85 12/12/2024    ALT 15 12/12/2024    AST 28 12/12/2024    BILT 0.5 12/12/2024    ALB 4.3 12/12/2024    TP 7.5 12/12/2024        Impression and Plan:    Polycythemia vera, JAK2 V617F positive  - high risk due to age and CAD.   - continue hydrea same dose 1000mg am, 500mg pm. Labs at goal with normal CBC. No dose adjustment.   - discussed again goals of therapy in PV and small risk of transformation to MF and AML.   - continue ASA 81 mg daily. Continue folic acid with hydrea  - macrocytosis is due to hydrea. Continue to monitor      Return in about 6 months (around 6/12/2025).       Renetta Vicente MD  Hematology Oncology

## 2024-12-14 NOTE — TELEPHONE ENCOUNTER
Endocrine Refill protocol for oral medications    Protocol Criteria:  PASSED  Reason: N/A    If below requirement is met, send a 90-day supply with 1 refill per provider protocol.    Verify appointment with Endocrinology completed in the last 6 months or scheduled in the next 3 months.    Last completed office visit: 9/18/2024 Tierra Jain MD   Next scheduled Follow up:   Future Appointments   Date Time Provider Department Center   2/24/2025 12:00 PM Jer Steen MD [facility]   6/12/2025  9:45 AM [facility]   6/12/2025 10:40 AM Renetta Vicente MD [facility]

## 2024-12-16 RX ORDER — LINAGLIPTIN 5 MG/1
5 TABLET, FILM COATED ORAL DAILY
Qty: 90 TABLET | Refills: 0 | Status: SHIPPED | OUTPATIENT
Start: 2024-12-16

## 2025-01-03 NOTE — TELEPHONE ENCOUNTER
Please Review. Protocol Failed; No Protocol   Lab Results   Component Value Date    A1C 8.3 (A) 09/18/2024     Requested Prescriptions   Pending Prescriptions Disp Refills    METFORMIN HCL 1000 MG Oral Tab [Pharmacy Med Name: METFORMIN HCL 1,000 MG TABLET] 180 tablet 3     Sig: TAKE 1 TABLET BY MOUTH TWICE A DAY       Diabetes Medication Protocol Failed - 1/3/2025 11:20 AM        Failed - Last A1C < 7.5 and within past 6 months     Lab Results   Component Value Date    A1C 8.3 (A) 09/18/2024             Passed - In person appointment or virtual visit in the past 6 mos or appointment in next 3 mos     Recent Outpatient Visits              [facility]    [facility] Hematology Oncology Renetta Vicente MD    Office Visit    3 weeks ago Polycythemia vera ([facility])    [facility] Hematology Oncology    Nurse Only    2 months ago Coronary artery disease of native artery of native heart with stable angina pectoris ([facility])    [facility] Jer Steen MD    Office Visit    3 months ago Dyslipidemia    [facility] Tierra Jain MD    Office Visit    [facility]    [facility] Hematology Oncology Renetta Vicente MD    Office Visit          Future Appointments         Provider Department Appt Notes    In 1 month Jer Steen MD [facility]     In 2 months Tierra Jain MD [facility] DM    In 5 months [facility] On[facility] [place] [email or web address]    In 5 months [facility] [place] 6 month f/u. Hydrea monitoring                    Passed - Microalbumin procedure in past 12 months or taking ACE/ARB        Passed - EGFRCR or GFRAA > 50      GFR Evaluation  EGFRCR: 70 , resulted on 12/12/2024          Passed - GFR in the past 12 months               Future Appointments         Provider Department Appt Notes    In 1 month Jer Steen MD [facility]     In 2 months Tierra Jain MD [facility] DM    In 5 months [facility] Hematology Oncology [place] [email or web address]    In 5 months Renetta Vicente MD [facility] Hematology Oncology [place] 6 month f/u. Hydrea monitoring          Recent Outpatient Visits              3 weeks ago Polycythemia vera ([facility])    [facility] Hematology Oncology Renetta Vicente MD    Office Visit    3 weeks ago Polycythemia vera ([facility])    [facility] Hematology Oncology    Nurse Only    2 months ago Coronary artery disease of native artery of native heart with stable angina pectoris ([facility])    [facility] Jer Steen MD    Office Visit    3 months ago Dyslipidemia    [facility] Tierra Jain MD    Office Visit    6 months ago Polycythemia vera ([facility])    [facility] Hematology Oncology Renetta Vicente MD    Office Visit

## 2025-01-07 ENCOUNTER — PATIENT MESSAGE (OUTPATIENT)
Facility: CLINIC | Age: 80
End: 2025-01-07

## 2025-01-07 ENCOUNTER — TELEPHONE (OUTPATIENT)
Dept: CASE MANAGEMENT | Age: 80
End: 2025-01-07

## 2025-01-07 DIAGNOSIS — E11.9 TYPE 2 DIABETES MELLITUS WITHOUT COMPLICATION, WITHOUT LONG-TERM CURRENT USE OF INSULIN (HCC): Primary | ICD-10-CM

## 2025-01-07 DIAGNOSIS — E11.9 TYPE 2 DIABETES MELLITUS WITHOUT COMPLICATION, WITHOUT LONG-TERM CURRENT USE OF INSULIN (HCC): ICD-10-CM

## 2025-01-07 DIAGNOSIS — Z01.00 DIABETIC EYE EXAM (HCC): Primary | ICD-10-CM

## 2025-01-07 DIAGNOSIS — E11.9 DIABETIC EYE EXAM (HCC): Primary | ICD-10-CM

## 2025-01-07 RX ORDER — ROSUVASTATIN CALCIUM 5 MG/1
5 TABLET, COATED ORAL NIGHTLY
Qty: 90 TABLET | Refills: 3 | Status: SHIPPED | OUTPATIENT
Start: 2025-01-07

## 2025-01-07 NOTE — TELEPHONE ENCOUNTER
Refill passed per [facility] protocol.     Requested Prescriptions   Pending Prescriptions Disp Refills    ROSUVASTATIN 5 MG Oral Tab [Pharmacy Med Name: ROSUVASTATIN CALCIUM 5 MG TAB] 90 tablet 1     Sig: TAKE 1 TABLET BY MOUTH EVERY DAY AT NIGHT       Cholesterol Medication Protocol Passed - 1/7/2025  1:36 PM        Passed - ALT < 80     Lab Results   Component Value Date    ALT 15 12/12/2024             Passed - ALT resulted within past year        Passed - Lipid panel within past 12 months     Lab Results   Component Value Date    CHOLEST 152 10/21/2024    TRIG 86 10/21/2024    HDL 52 10/21/2024    LDL 84 10/21/2024    VLDL 14 10/21/2024    NONHDLC 100 10/21/2024             Passed - In person appointment or virtual visit in the past 12 mos or appointment in next 3 mos     Recent Outpatient Visits              3 weeks ago [facility] ([facility])    [facility] Hematology Oncology Renetta Vicente MD    Office Visit    3 weeks ago [facility] ([facility])    [facility] Hematology Oncology    Nurse Only    2 months ago Coronary artery disease of native artery of native heart with stable angina pectoris ([facility])    [facility] Jer Steen MD    Office Visit    3 months ago Dyslipidemia    [facility] Tierra Jain MD    Office Visit    7 months ago [facility] ([facility])    [facility] Hematology Oncology Renetta Vicente MD    Office Visit          Future Appointments         Provider Department Appt Notes    In 1 month Jer Steen MD [facility]     In 2 months Tierra Jain MD [facility] DM    In 5 months [facility] Hematology Oncology [place] [email or web address]    In 5 months Renetta Vicente MD [facility]  [facility] Hematology Oncology [place] 6 month f/u. Hydrea monitoring                          Future Appointments         Provider Department Appt Notes    In 1 month Jer Steen MD [facility]     In 2 months Tierra Jain MD [facility] DM    In 5 months [facility] [place] [facility] [place] [email or web address]    In 5 months Renetta Vicente MD [facility] Hematology Oncology [place] 6 month f/u. Hydrea monitoring             Recent Outpatient Visits              3 weeks ago Polycythemia vera ([facility]    [facility] Hematology Oncology Renetta Vicente MD    Office Visit    3 weeks ago Polycythemia vera ([facility])    [facility] Hematology Oncology    Nurse Only    2 months ago Coronary artery disease of native artery of native heart with stable angina pectoris (HCC)    [facility] Jer Steen MD    Office Visit    3 months ago Dyslipidemia    [facility] Tierra Jain MD    Office Visit    7 months ago Polycythemia vera ([facility])    [facility] Hematology Oncology Renetta Vicente MD    Office Visit

## 2025-01-07 NOTE — TELEPHONE ENCOUNTER
Dr. Steen,     Dr. Thomson's office called requesting referral for appointment 1/13/25.     Pended referral please review diagnosis and sign off if you agree.    Thank you.  Debbie Munoz  [facility]

## 2025-01-24 ENCOUNTER — ORDER TRANSCRIPTION (OUTPATIENT)
Dept: ADMINISTRATIVE | Facility: HOSPITAL | Age: 80
End: 2025-01-24

## 2025-01-24 DIAGNOSIS — Z12.31 ENCOUNTER FOR SCREENING MAMMOGRAM FOR MALIGNANT NEOPLASM OF BREAST: Primary | ICD-10-CM

## 2025-02-17 PROBLEM — E66.812 CLASS 2 SEVERE OBESITY WITH SERIOUS COMORBIDITY AND BODY MASS INDEX (BMI) OF 35.0 TO 35.9 IN ADULT (HCC): Status: RESOLVED | Noted: 2022-11-15 | Resolved: 2025-02-17

## 2025-02-17 PROBLEM — E66.01 CLASS 2 SEVERE OBESITY WITH SERIOUS COMORBIDITY AND BODY MASS INDEX (BMI) OF 35.0 TO 35.9 IN ADULT (HCC): Status: RESOLVED | Noted: 2022-11-15 | Resolved: 2025-02-17

## 2025-02-24 ENCOUNTER — OFFICE VISIT (OUTPATIENT)
Dept: INTERNAL MEDICINE CLINIC | Facility: CLINIC | Age: 80
End: 2025-02-24

## 2025-02-24 VITALS
HEIGHT: 63.5 IN | BODY MASS INDEX: 35.35 KG/M2 | HEART RATE: 80 BPM | WEIGHT: 202 LBS | OXYGEN SATURATION: 98 % | SYSTOLIC BLOOD PRESSURE: 134 MMHG | DIASTOLIC BLOOD PRESSURE: 76 MMHG

## 2025-02-24 DIAGNOSIS — I25.118 CORONARY ARTERY DISEASE OF NATIVE ARTERY OF NATIVE HEART WITH STABLE ANGINA PECTORIS: ICD-10-CM

## 2025-02-24 DIAGNOSIS — E11.9 TYPE 2 DIABETES MELLITUS WITHOUT COMPLICATION, WITHOUT LONG-TERM CURRENT USE OF INSULIN (HCC): Primary | ICD-10-CM

## 2025-02-24 DIAGNOSIS — I10 HYPERTENSION, UNSPECIFIED TYPE: ICD-10-CM

## 2025-02-24 DIAGNOSIS — E78.5 DYSLIPIDEMIA: ICD-10-CM

## 2025-02-24 DIAGNOSIS — M25.512 LEFT SHOULDER PAIN, UNSPECIFIED CHRONICITY: ICD-10-CM

## 2025-02-24 DIAGNOSIS — Z23 NEED FOR PNEUMOCOCCAL VACCINATION: ICD-10-CM

## 2025-02-24 DIAGNOSIS — D45 POLYCYTHEMIA VERA (HCC): ICD-10-CM

## 2025-02-24 LAB — HEMOGLOBIN A1C: 7.9 % (ref 4.3–5.6)

## 2025-02-24 NOTE — PROGRESS NOTES
Subjective:   Corrine Keen is a 79 year old female who presents for a MA AHA (Medicare Advantage Annual Health Assessment) and scheduled follow up of multiple significant but stable problems.   History of Present Illness    79-year-old pleasant lady here for Medicare annual wellness visit.  She reports that she is doing well.  No abuse no depression no falls reported.  History/Other:   Fall Risk Assessment:   She has been screened for Falls and is low risk.      Cognitive Assessment:   She had a completely normal cognitive assessment - see flowsheet entries     Functional Ability/Status:   Corrine Keen has some abnormal functions as listed below:  She has difficulties Shopping for Groceries based on screening of functional status.       Depression Screening (PHQ):  PHQ-2 SCORE: 0  , done 2/17/2025        <5 minutes spent screening and counseling for depression    Advanced Directives:   She does NOT have a Living Will. [Do you have a living will?: (Patient-Rptd) No]  She does NOT have a Power of  for Health Care. [Do you have a healthcare power of ?: (Patient-Rptd) No]  Discussed Advance Care Planning with patient (and family/surrogate if present). Standard forms made available to patient in After Visit Summary.      Patient Active Problem List   Diagnosis    Type 2 diabetes mellitus without complication, without long-term current use of insulin (HCC)    Hypertension    Polycythemia vera (HCC)    Dyslipidemia    Coronary artery disease of native heart with stable angina pectoris    Encounter for monitoring of hydroxyurea therapy    Macrocytosis without anemia    BMI 34.0-34.9,adult     Allergies:  She is allergic to lisinopril.    Current Medications:  Outpatient Medications Marked as Taking for the 2/24/25 encounter (Office Visit) with Jer Steen MD   Medication Sig    Flaxseed, Linseed, (FLAXSEED OIL OR) Take by mouth.    rosuvastatin 5 MG Oral Tab Take 1 tablet (5 mg total) by mouth  nightly.    metFORMIN HCl 1000 MG Oral Tab Take 1 tablet (1,000 mg total) by mouth 2 (two) times daily.    TRADJENTA 5 MG Oral Tab TAKE 1 TABLET (5 MG TOTAL) BY MOUTH DAILY.    hydroxyurea 500 MG Oral Cap TAKE 2 CAPSULES IN THE MORNING, AND 1 CAPSULE IN THE EVENING FOR A TOTAL OF 1500 MG DAILY.    ACCU-CHEK GUIDE In Vitro Strip CHECK BLOOD SUGAR ONCE A DAY    BITTER MELON OR Take by mouth.    pantoprazole 40 MG Oral Tab EC Take 1 tablet (40 mg total) by mouth daily.    metoprolol succinate  MG Oral Tablet 24 Hr Take 1 tablet (100 mg total) by mouth daily.    amLODIPine 10 MG Oral Tab Take 1 tablet (10 mg total) by mouth daily.    COD LIVER OIL OR Take by mouth.    folic acid 400 MCG Oral Tab Take 1 tablet (400 mcg total) by mouth daily.    losartan 50 MG Oral Tab Take 1 tablet (50 mg total) by mouth daily.    Accu-Chek Softclix Lancets Does not apply Misc Use to check blood sugars once daily    Blood Glucose Monitoring Suppl (ACCU-CHEK GUIDE) w/Device Does not apply Kit Check blood sugar once a day    Glucose Blood (CONTOUR NEXT TEST) In Vitro Strip Check sugars once a day E 11.65 without insulin use    Cholecalciferol (VITAMIN D) 50 MCG (2000 UT) Oral Tab Take 1 tablet by mouth daily.    aspirin 81 MG Oral Chew Tab Chew 1 tablet (81 mg total) by mouth daily.       Medical History:  She  has a past medical history of Diabetes (HCC), Elevated troponin (12/27/2022), Hypertension, and Polycythemia vera (HCC).  Surgical History:  She  has a past surgical history that includes Cataract extraction (2019); dilation/curettage,diagnostic; and edmundo biopsy stereo nodule 1 site right (cpt=19081).   Family History:  Her family history includes Breast Cancer in her sister and sister; Breast Cancer (age of onset: 75) in her sister; Hypertension in her father and mother.  Social History:  She  reports that she has never smoked. She has never used smokeless tobacco. She reports that she does not currently use alcohol. She  reports that she does not use drugs.    Tobacco:  She has never smoked tobacco.    CAGE Alcohol Screen:   CAGE screening score of 0 on 2/17/2025, showing low risk of alcohol abuse.      Patient Care Team:  Jer Steen MD as PCP - General (Internal Medicine)    Review of Systems   Constitutional:  Negative for activity change, appetite change and fever.   HENT:  Negative for congestion and voice change.    Respiratory:  Negative for cough and shortness of breath.    Cardiovascular:  Negative for chest pain.   Gastrointestinal:  Negative for abdominal distention, abdominal pain and vomiting.   Genitourinary:  Negative for hematuria.   Skin:  Negative for wound.   Psychiatric/Behavioral:  Negative for behavioral problems.    Shoulder pain present      Objective:   Physical Exam  Constitutional:       Appearance: Normal appearance.   HENT:      Head: Normocephalic.   Eyes:      Conjunctiva/sclera: Conjunctivae normal.   Cardiovascular:      Rate and Rhythm: Normal rate and regular rhythm.      Heart sounds: Normal heart sounds. No murmur heard.  Pulmonary:      Effort: Pulmonary effort is normal.      Breath sounds: Normal breath sounds. No rhonchi or rales.   Abdominal:      General: Bowel sounds are normal.      Palpations: Abdomen is soft.      Tenderness: There is no abdominal tenderness.   Musculoskeletal:      Cervical back: Neck supple.      Right lower leg: No edema.      Left lower leg: No edema.   Skin:     General: Skin is warm and dry.   Neurological:      General: No focal deficit present.      Mental Status: She is alert and oriented to person, place, and time. Mental status is at baseline.   Psychiatric:         Mood and Affect: Mood normal.         Behavior: Behavior normal.     Left shoulder has restricted range of motion      /76   Pulse 80   Ht 5' 3.5\" (1.613 m)   Wt 202 lb (91.6 kg)   SpO2 98%   BMI 35.22 kg/m²  Estimated body mass index is 35.22 kg/m² as calculated from the  following:    Height as of this encounter: 5' 3.5\" (1.613 m).    Weight as of this encounter: 202 lb (91.6 kg).    Medicare Hearing Assessment:   Hearing Screening    Time taken: 2/24/2025 12:26 PM  Screening Method: Questionnaire  I have a problem hearing over the telephone: No I have trouble following the conversations when two or more people are talking at the same time: No   I have trouble understanding things on the TV: No I have to strain to understand conversations: No   I have to worry about missing the telephone ring or doorbell: No I have trouble hearing conversations in a noisy background such as a crowded room or restaurant: No   I get confused about where sounds come from: No I misunderstand some words in a sentence and need to ask people to repeat themselves: No   I especially have trouble understanding the speech of women and children: No I have trouble understanding the speaker in a large room such as at a meeting or place of [employer]: No   Many people I talk to seem to mumble (or don't speak clearly): No People get annoyed because I misunderstand what they say: No   I misunderstand what others are saying and make inappropriate responses: No I avoid social activities because I cannot hear well and fear I will reply improperly: No   Family members and friends have told me they think I may have hearing loss: No                   Assessment & Plan:   Corrine Keen is a 79 year old female who presents for a Medicare Assessment.     1. Type 2 diabetes mellitus without complication, without long-term current use of insulin (HCC) (Primary)-hemoglobin A1c is improving.  7.9.  Continue metformin and Tradjenta.  Continue statins.  -     CBC, Platelet; No Differential  -     Comp Metabolic Panel (14)  -     Cancel: Hemoglobin A1C  -     Lipid Panel  -     TSH W Reflex To Free T4  -     Microalb/Creat Ratio, Random Urine  -     POC Hemoglobin A1C  2. Polycythemia vera (HCC) stable will follow hematology  recommendations.  Overview:  Dr Jackson  3. Coronary artery disease of native artery of native heart with stable angina pectoris-stable with unremarkable cardiac review of systems  Overview:  S/p PCI     4. Hypertension, unspecified type pressure is controlled   -     CBC, Platelet; No Differential  -     Comp Metabolic Panel (14)  -     Cancel: Hemoglobin A1C  -     Lipid Panel  -     TSH W Reflex To Free T4  -     Microalb/Creat Ratio, Random Urine  5. Dyslipidemia continue statins  6. BMI 34.0-34.9,adult encourage weight loss  7. Need for pneumococcal vaccination  -     Prevnar 20 (PCV20) [01590]  8. Left shoulder pain, unspecified chronicity most likely from adhesive capsulitis.      The patient indicates understanding of these issues and agrees to the plan.  Reinforced healthy diet, lifestyle, and exercise.  PCV 20 given today.  Discussed regarding Shingrix vaccine.  She already has appointment for mammography.  She completed eye exam.  Will try to obtain the report.    No follow-ups on file.     Jer Steen MD, 2/24/2025     Supplementary Documentation:   General Health:  In the past six months, have you lost more than 10 pounds without trying?: (Patient-Rptd) 2 - No  Has your appetite been poor?: (Patient-Rptd) No  Type of Diet: (Patient-Rptd) Balanced  How does the patient maintain a good energy level?: (Patient-Rptd) Appropriate Exercise  How would you describe your daily physical activity?: (Patient-Rptd) Light  How would you describe your current health state?: (Patient-Rptd) Fair  How do you maintain positive mental well-being?: (Patient-Rptd) Social Interaction;Visiting Family  On a scale of 0 to 10, with 0 being no pain and 10 being severe pain, what is your pain level?: (Patient-Rptd) 4 - (Moderate)  In the past six months, have you experienced urine leakage?: (Patient-Rptd) 0-No  At any time do you feel concerned for the safety/well-being of yourself and/or your children, in your home or elsewhere?:  (Patient-Rptd) No  Have you had any immunizations at another office such as Influenza, Hepatitis B, Tetanus, or Pneumococcal?: (Patient-Rptd) Yes    Health Maintenance   Topic Date Due    Zoster Vaccines (1 of 2) Never done    Colorectal Cancer Screening  08/02/2022    Diabetes Care Dilated Eye Exam  05/09/2024    Annual Well Visit  01/01/2025    Diabetes Care: Foot Exam (Annual)  01/01/2025    Diabetes Care: Microalb/Creat Ratio (Annual)  01/01/2025    COVID-19 Vaccine (6 - 2024-25 season) 06/15/2025    Diabetes Care A1C  08/24/2025    Diabetes Care: GFR  12/12/2025    Influenza Vaccine  Completed    DEXA Scan  Completed    Annual Depression Screening  Completed    Fall Risk Screening (Annual)  Completed    Pneumococcal Vaccine: 50+ Years  Completed    Meningococcal B Vaccine  Aged Out    Mammogram  Discontinued

## 2025-03-12 ENCOUNTER — HOSPITAL ENCOUNTER (OUTPATIENT)
Dept: MAMMOGRAPHY | Facility: HOSPITAL | Age: 80
Discharge: HOME | End: 2025-03-12
Attending: INTERNAL MEDICINE
Payer: MEDICARE

## 2025-03-12 DIAGNOSIS — Z12.31 ENCOUNTER FOR SCREENING MAMMOGRAM FOR MALIGNANT NEOPLASM OF BREAST: ICD-10-CM

## 2025-03-12 PROCEDURE — 77063 BREAST TOMOSYNTHESIS BI: CPT | Performed by: INTERNAL MEDICINE

## 2025-03-12 PROCEDURE — 77067 SCR MAMMO BI INCL CAD: CPT | Performed by: INTERNAL MEDICINE

## 2025-03-21 ENCOUNTER — OFFICE VISIT (OUTPATIENT)
Dept: ENDOCRINOLOGY CLINIC | Facility: CLINIC | Age: 80
End: 2025-03-21
Payer: MEDICARE

## 2025-03-21 VITALS
HEART RATE: 84 BPM | DIASTOLIC BLOOD PRESSURE: 72 MMHG | WEIGHT: 197 LBS | SYSTOLIC BLOOD PRESSURE: 133 MMHG | HEIGHT: 63 IN | BODY MASS INDEX: 34.91 KG/M2

## 2025-03-21 DIAGNOSIS — E78.5 DYSLIPIDEMIA: ICD-10-CM

## 2025-03-21 DIAGNOSIS — E11.69 TYPE 2 DIABETES MELLITUS WITH OTHER SPECIFIED COMPLICATION, WITHOUT LONG-TERM CURRENT USE OF INSULIN (HCC): Primary | ICD-10-CM

## 2025-03-21 LAB
GLUCOSE BLOOD: 238
TEST STRIP LOT #: NORMAL NUMERIC

## 2025-03-21 PROCEDURE — 99213 OFFICE O/P EST LOW 20 MIN: CPT | Performed by: INTERNAL MEDICINE

## 2025-03-21 PROCEDURE — 3008F BODY MASS INDEX DOCD: CPT | Performed by: INTERNAL MEDICINE

## 2025-03-21 PROCEDURE — 82947 ASSAY GLUCOSE BLOOD QUANT: CPT | Performed by: INTERNAL MEDICINE

## 2025-03-21 PROCEDURE — 1159F MED LIST DOCD IN RCRD: CPT | Performed by: INTERNAL MEDICINE

## 2025-03-21 PROCEDURE — 3078F DIAST BP <80 MM HG: CPT | Performed by: INTERNAL MEDICINE

## 2025-03-21 PROCEDURE — 3075F SYST BP GE 130 - 139MM HG: CPT | Performed by: INTERNAL MEDICINE

## 2025-03-21 PROCEDURE — 1160F RVW MEDS BY RX/DR IN RCRD: CPT | Performed by: INTERNAL MEDICINE

## 2025-03-21 NOTE — PATIENT INSTRUCTIONS
FOOD   Fruits: is high in sugars. Following are better: berries, apples, pears, papaya --> but limit quantity  Vegetables are good except Corn, sweet potato and potato and beans-> limit consumption of these  Protein: you can have chicken fish, turkey( white thin lean meats are good for  you), eggs   Grains like bread, pasta, rice, quinoa, cereal, oatmeal are all high in sugar content . So you want to less of these. Also choose the whole wheat brown bread/ pasta since they are better for you in regards to diabetes  Dairy:  in moderation    Walk as tolerated, 5-10 minutes after each meal

## 2025-03-21 NOTE — PROGRESS NOTES
FU    CHIEF COMPLAINT:    Chief Complaint   Patient presents with    Diabetes     Pt is in for a Diabetes followup. Glucose check        HISTORY OF PRESENT ILLNESS:   Corrine Keen is a 79 year old female who is here to FU  for DM.     DM HISTORY  Diagnosed: around age 71      HISTORY OF DIABETES COMPLICATIONS: :  History of Retinopathy: denies - last eye exam: Summer, 2024, Dr. Thomson  History of Neuropathy: denies  History of Nephropathy: Last Ur MA was normal    ASSOCIATED COMPLICATIONS:   HTN: yes  Hyperlipidemia: yes  Coronary Artery Disease:  yes  Cerebrovascular Disease:denies      HOME GLUCOSE READINGS:   Checks BG daily  Fasting : 140-180      No low BG under 70    CURRENT DIABETIC MEDICATIONS INCLUDE:  MTF 1000 mg BID  Linagliptin 5 mg daily         MEALS:  Dietary compliance is moderate    EXERCISE:  Stays active      CURRENT MEDICATIONS:    Current Outpatient Medications   Medication Sig Dispense Refill    metFORMIN HCl 1000 MG Oral Tab Take 1 tablet (1,000 mg total) by mouth 2 (two) times daily. 180 tablet 3    TRADJENTA 5 MG Oral Tab TAKE 1 TABLET (5 MG TOTAL) BY MOUTH DAILY. 90 tablet 0    Glucose Blood (ACCU-CHEK GUIDE TEST) In Vitro Strip Check blood sugar once a day 100 each 1    Flaxseed, Linseed, (FLAXSEED OIL OR) Take by mouth.      rosuvastatin 5 MG Oral Tab Take 1 tablet (5 mg total) by mouth nightly. 90 tablet 3    hydroxyurea 500 MG Oral Cap TAKE 2 CAPSULES IN THE MORNING, AND 1 CAPSULE IN THE EVENING FOR A TOTAL OF 1500 MG DAILY. 270 capsule 5    ACCU-CHEK GUIDE In Vitro Strip CHECK BLOOD SUGAR ONCE A  strip 0    BITTER MELON OR Take by mouth.      pantoprazole 40 MG Oral Tab EC Take 1 tablet (40 mg total) by mouth daily. 90 tablet 3    metoprolol succinate  MG Oral Tablet 24 Hr Take 1 tablet (100 mg total) by mouth daily. 90 tablet 3    amLODIPine 10 MG Oral Tab Take 1 tablet (10 mg total) by mouth daily. 90 tablet 0    COD LIVER OIL OR Take by mouth.      folic acid 400 MCG  Oral Tab Take 1 tablet (400 mcg total) by mouth daily.      losartan 50 MG Oral Tab Take 1 tablet (50 mg total) by mouth daily.      Accu-Chek Softclix Lancets Does not apply Misc Use to check blood sugars once daily 100 each 0    Blood Glucose Monitoring Suppl (ACCU-CHEK GUIDE) w/Device Does not apply Kit Check blood sugar once a day 1 kit 0    Hydroquinone 4 % External Cream Apply 1 Application. topically 2 (two) times daily. Use bid as directed to hands (Patient not taking: Reported on 2/24/2025) 30 g 1    Cholecalciferol (VITAMIN D) 50 MCG (2000 UT) Oral Tab Take 1 tablet by mouth daily.      aspirin 81 MG Oral Chew Tab Chew 1 tablet (81 mg total) by mouth daily.         PAST MEDICAL HISTORY:   Past Medical History:    Diabetes (HCC)    Elevated troponin    Hypertension    Polycythemia vera (HCC)       PAST SURGICAL HISTORY:   Past Surgical History:   Procedure Laterality Date    Cataract extraction  2019    Right eye     Dilation/curettage,diagnostic      Sara biopsy stereo nodule 1 site right (cpt=19081)      2015? results benign       ALLERGIES:  Allergies   Allergen Reactions    Lisinopril TONGUE SWELLING       SOCIAL HISTORY:    Social History     Socioeconomic History    Marital status: Single   Tobacco Use    Smoking status: Never    Smokeless tobacco: Never   Vaping Use    Vaping status: Never Used   Substance and Sexual Activity    Alcohol use: Not Currently     Comment: socially drinks     Drug use: Never    Sexual activity: Yes   Other Topics Concern    Reaction to local anesthetic No    Pt has a pacemaker No    Pt has a defibrillator No       FAMILY HISTORY:   Family History   Problem Relation Age of Onset    Hypertension Mother     Hypertension Father     Breast Cancer Sister         40's    Breast Cancer Sister 75    Breast Cancer Sister         30's    Prostate Cancer Brother 50 - 59    Prostate Cancer Brother 50 - 59    Prostate Cancer Brother 40 - 49    Ovarian Cancer Neg     Pancreatic Cancer  Neg        ASSESSMENTS:        REVIEW OF SYSTEMS:  Constitutional: Negative for: weight change, fever, fatigue, cold/heat intolerance  Eyes: Negative for:  Visual changes, proptosis, blurring, floaters, poor night vision, impaired color vision  ENT: Negative for:  dysphagia, neck swelling, dysphonia  Respiratory: Negative for:  dyspnea, cough  Cardiovascular: Negative for:  chest pain, palpitations, orthopnea  GI: Negative for:  abdominal pain, nausea, vomiting, diarrhea, constipation, bleeding  Neurology: Negative for: headache, numbness, weakness,   Genito-Urinary: Negative for: dysuria, frequency  Psychiatric: Negative for:  depression, anxiety  Hematology/Lymphatics: Negative for: bruising, lower extremity edema  Endocrine: Negative for: polyuria, polydypsia  Skin: Negative for: rash, blister,      PHYSICAL EXAM:   Vitals:    03/21/25 1019   BP: 133/72   Pulse: 84   Weight: 197 lb (89.4 kg)   Height: 5' 3\" (1.6 m)     BMI: Body mass index is 34.9 kg/m².         General Appearance:  alert, well developed, in no acute distress  Head: Atraumatic  Eyes:  normal conjunctivae, sclera., normal sclera and normal pupils  Throat/Neck: normal sound to voice. Normal hearing, normal speech  Respiratory:  Speaking in full sentences, non-labored. no increased work of breathing, no audible wheezing    Neuro: motor grossly intact, moving all extremities without difficulty  Psychiatric:  oriented to time, self, and place  Extremities: Sep 2024  Bilateral barefoot skin diabetic exam is normal, visualized feet and the appearance is normal.  Bilateral monofilament/sensation of both feet is normal.  Pulsation pedal pulse exam of both lower legs/feet is normal as well.          DATA:     Pertinent data reviewed    ASSESSMENT AND PLAN:    1. DM: a1c is 7.9 % ( 3/2025)       Plan:  Discussed the pathogenesis, natural course of diabetes. Patient understands the importance of glycemic control and the implications of uncontrolled diabetes  including Diabetic ketoacidosis and various micro vascular and macrovascular complications.        a). Medications:    A1c is slightly high, but better than last time  Goal is to get it around 7.5 - 7.7 % ( age appropriate)   She will cpm but work on being more active and decreasing low carb content of meals   If A1c remains elevated, can consider low dose of jardiance      C/w MTF 1000 mg BF and dinner   No GI SE    Linagliptin 5 mg daily     Check and call with Bg as discussed  Can alternate timings  Call if Bg go up  pre meal over 180    b). Urine microalbumin /creatinine normal   c). Discussed importance of annual eye exams  d). Foot exam: Daily feet exam explained  e). BG log maintainence explained in great detail, to get log and glucometer on next visit.  f). Life style changes reviewed  g). Hypoglycemia recognition and management discussed    2. Patient’s BP is  normal today  3. Dyslipidemia  Reports moderate compliance with statin: discussed importance of compliance  A) Discussed lifestyle modifications including reductions in dietary total and saturated fat, weight loss, aerobic exercise, and eating a diet rich in fruits and vegetables.  B) c/w Statin     Patient verbalized a complete  understanding of all of the above and did not have any further questions.     RTC in 4 months      Orders Placed This Encounter   Procedures    POC HemoCue Glucose 201 (Finger stick glucose)         Tierra Jain MD

## 2025-04-06 RX ORDER — LINAGLIPTIN 5 MG/1
5 TABLET, FILM COATED ORAL DAILY
Qty: 90 TABLET | Refills: 1 | Status: SHIPPED | OUTPATIENT
Start: 2025-04-06

## 2025-04-14 ENCOUNTER — MED REC SCAN ONLY (OUTPATIENT)
Dept: INTERNAL MEDICINE CLINIC | Facility: CLINIC | Age: 80
End: 2025-04-14

## 2025-05-13 RX ORDER — PANTOPRAZOLE SODIUM 40 MG/1
40 TABLET, DELAYED RELEASE ORAL DAILY
Qty: 90 TABLET | Refills: 3 | Status: SHIPPED | OUTPATIENT
Start: 2025-05-13

## 2025-06-02 RX ORDER — METOPROLOL SUCCINATE 100 MG/1
100 TABLET, EXTENDED RELEASE ORAL DAILY
Qty: 90 TABLET | Refills: 3 | Status: SHIPPED | OUTPATIENT
Start: 2025-06-02

## 2025-06-02 NOTE — TELEPHONE ENCOUNTER
Refill passed per [facility] protocols.    Requested Prescriptions   Pending Prescriptions Disp Refills    METOPROLOL SUCCINATE  MG Oral Tablet 24 Hr [Pharmacy Med Name: METOPROLOL SUCC  MG TAB] 90 tablet 3     Sig: TAKE 1 TABLET BY MOUTH EVERY DAY       Hypertension Medications Protocol Passed - 6/2/2025 12:17 PM

## 2025-06-04 ENCOUNTER — HOSPITAL ENCOUNTER (EMERGENCY)
Facility: HOSPITAL | Age: 80
Discharge: HOME | End: 2025-06-04
Attending: EMERGENCY MEDICINE
Payer: MEDICARE

## 2025-06-04 VITALS
DIASTOLIC BLOOD PRESSURE: 68 MMHG | OXYGEN SATURATION: 95 % | RESPIRATION RATE: 15 BRPM | BODY MASS INDEX: 34.55 KG/M2 | WEIGHT: 195 LBS | HEIGHT: 63 IN | TEMPERATURE: 97 F | SYSTOLIC BLOOD PRESSURE: 132 MMHG | HEART RATE: 71 BPM

## 2025-06-04 DIAGNOSIS — L29.89 PRURITIC ERYTHEMATOUS RASH: Primary | ICD-10-CM

## 2025-06-04 PROCEDURE — 99284 EMERGENCY DEPT VISIT MOD MDM: CPT

## 2025-06-04 PROCEDURE — 99283 EMERGENCY DEPT VISIT LOW MDM: CPT

## 2025-06-04 RX ORDER — HYDROXYZINE HYDROCHLORIDE 25 MG/1
25 TABLET, FILM COATED ORAL EVERY 12 HOURS PRN
Qty: 6 TABLET | Refills: 0 | Status: SHIPPED | OUTPATIENT
Start: 2025-06-04 | End: 2025-06-07

## 2025-06-04 RX ORDER — DOXYCYCLINE 100 MG/1
100 CAPSULE ORAL ONCE
Status: COMPLETED | OUTPATIENT
Start: 2025-06-04 | End: 2025-06-04

## 2025-06-04 RX ORDER — DOXYCYCLINE 100 MG/1
100 CAPSULE ORAL 2 TIMES DAILY
Qty: 14 CAPSULE | Refills: 0 | Status: SHIPPED | OUTPATIENT
Start: 2025-06-04 | End: 2025-06-11

## 2025-06-04 RX ORDER — MUPIROCIN 20 MG/G
1 OINTMENT TOPICAL 3 TIMES DAILY
Qty: 30 G | Refills: 0 | Status: SHIPPED | OUTPATIENT
Start: 2025-06-04 | End: 2025-06-18

## 2025-06-04 NOTE — ED PROVIDER NOTES
Patient Seen in: [facility] Emergency Department    History     Chief Complaint   Patient presents with    Rash     Stated Complaint: bumps all over her body     HPI    79-year-old female with past medical history of diabetes, hypertension, polycythemia presented for evaluation and reported rash to back over the past 4 days now noting erythema.  No new allergen exposures, no shortness of breath or GI complaints.  No medications prior to arrival.  No drainage.  No new allergen exposures.    Past Medical History[1]    Past Surgical History[2]         Family History[3]    Short Social Hx on File[4]    Review of Systems :  Skin: Positive for rash and itching.    Positive for stated complaint: bumps all over her body  Other systems are as noted in HPI.  Constitutional and vital signs reviewed.      All other systems reviewed and negative except as noted above.    PSFH elements reviewed from today and agreed except as otherwise stated in HPI.    Physical Exam     ED Triage Vitals [06/04/25 1009]   /72   Pulse 83   Resp 16   Temp 97.1 °F (36.2 °C)   Temp src Temporal   SpO2 97 %   O2 Device None (Room air)       Current:/72   Pulse 83   Temp 97.1 °F (36.2 °C) (Temporal)   Resp 16   Ht 160 cm (5' 3\")   Wt 88.5 kg   SpO2 97%   BMI 34.54 kg/m²         Physical Exam   Constitutional: No distress.   HEENT: MMM.  No drooling or stridor.  No tongue or lip swelling.  No uvular edema.  Head: Normocephalic.   Eyes: No injection.  No periorbital edema.  Neck: Neck supple.   Cardiovascular: RRR.   Pulmonary/Chest: Effort normal. CTAB.  Abdominal: Soft.  Nontender.  Musculoskeletal: No gross deformity.  Neurological: Alert.   Skin: Skin is warm.  Mid back with multiple excoriative areas with subtle/superimposed cellulitis without crepitance/fluctuance/induration without dermatomal distribution.  No vesicular change.  Psychiatric: Cooperative.  Nursing note and vitals reviewed.        ED Course   Labs Reviewed  - No data to display         MDM   DIFFERENTIAL DIAGNOSIS: After history and physical exam differential diagnosis includes but is not limited to cellulitis, pruritus, atopic dermatitis, contact dermatitis, allergic reaction.    Pulse ox: 97%:Normal on RA, as independently interpreted by myself    Medical Decision Making  Evaluation for pruritic rash that can suture complaints or evidence for anaphylaxis/angioedema without vesicular change and with subtle superimposed excoriation isolated change.  Stable for discharge with empiric antibiotics, antihistamine therapy and ongoing outpatient follow-up with warning instructions for emergent return.    Problems Addressed:  Pruritic erythematous rash: acute illness or injury    Amount and/or Complexity of Data Reviewed  External Data Reviewed: labs and notes.     Details: 2/21/2025 A1c, 3/21/2025 endocrinology note reviewed    Risk  Prescription drug management.      I was wearing at minimum a facemask and eye protection throughout this encounter with handwashing performed prior and after patient evaluation without personal hand/facial/oropharyngeal contact and gloves worn throughout encounter. See note and/or contact this provider for further PPE details.    Disposition and Plan     Clinical Impression:  1. Pruritic erythematous rash        Disposition:  Discharge    Follow-up:  Jer Steen MD  [address] [place] [address]  [phone]    Call  For followup and re-evaluation.      Medications Prescribed:  Current Discharge Medication List        START taking these medications    Details   doxycycline 100 MG Oral Cap Take 1 capsule (100 mg total) by mouth 2 (two) times daily for 7 days.  Qty: 14 capsule, Refills: 0      mupirocin 2 % External Ointment Apply 1 Application topically 3 (three) times daily for 14 days.  Qty: 30 g, Refills: 0      hydrOXYzine 25 MG Oral Tab Take 1 tablet (25 mg total) by mouth every 12 (twelve) hours as needed for Itching.  Qty:  6 tablet, Refills: 0                      [1]   Past Medical History:   Diabetes (HCC)    Elevated troponin    Hypertension    Polycythemia vera (HCC)   [2]   Past Surgical History:  Procedure Laterality Date    Cataract extraction  2019    Right eye     Dilation/curettage,diagnostic      Sara biopsy stereo nodule 1 site right (cpt=19081)      2015? results benign   [3]   Family History  Problem Relation Age of Onset    Hypertension Mother     Hypertension Father     Breast Cancer Sister         40's    Breast Cancer Sister 75    Breast Cancer Sister         30's    Prostate Cancer Brother 50 - 59    Prostate Cancer Brother 50 - 59    Prostate Cancer Brother 40 - 49    Ovarian Cancer Neg     Pancreatic Cancer Neg    [4]   Social History  Socioeconomic History    Marital status: Single   Tobacco Use    Smoking status: Never    Smokeless tobacco: Never   Vaping Use    Vaping status: Never Used   Substance and Sexual Activity    Alcohol use: Not Currently     Comment: socially drinks     Drug use: Never    Sexual activity: Yes   Other Topics Concern    Reaction to local anesthetic No    Pt has a pacemaker No    Pt has a defibrillator No     Social Drivers of Health     Food Insecurity: No Food Insecurity (2/24/2025)    NCSS - Food Insecurity     Worried About Running Out of Food in the Last Year: No     Ran Out of Food in the Last Year: No   Transportation Needs: No Transportation Needs (2/24/2025)    NCSS - Transportation     Lack of Transportation: No   Housing Stability: Not At Risk (2/24/2025)    NCSS - Housing/Utilities     Has Housing: Yes     Worried About Losing Housing: No     Unable to Get Utilities: No

## 2025-06-11 DIAGNOSIS — D45 POLYCYTHEMIA VERA (HCC): ICD-10-CM

## 2025-06-12 ENCOUNTER — NURSE ONLY (OUTPATIENT)
Age: 80
End: 2025-06-12
Attending: INTERNAL MEDICINE
Payer: MEDICARE

## 2025-06-12 ENCOUNTER — OFFICE VISIT (OUTPATIENT)
Age: 80
End: 2025-06-12
Attending: INTERNAL MEDICINE
Payer: MEDICARE

## 2025-06-12 VITALS
SYSTOLIC BLOOD PRESSURE: 143 MMHG | DIASTOLIC BLOOD PRESSURE: 83 MMHG | HEIGHT: 63.5 IN | TEMPERATURE: 98 F | WEIGHT: 197 LBS | RESPIRATION RATE: 18 BRPM | OXYGEN SATURATION: 96 % | HEART RATE: 73 BPM | BODY MASS INDEX: 34.47 KG/M2

## 2025-06-12 DIAGNOSIS — D45 POLYCYTHEMIA VERA (HCC): ICD-10-CM

## 2025-06-12 DIAGNOSIS — L81.9 HYPERPIGMENTATION OF SKIN: ICD-10-CM

## 2025-06-12 DIAGNOSIS — Z51.81 ENCOUNTER FOR MONITORING OF HYDROXYUREA THERAPY: ICD-10-CM

## 2025-06-12 DIAGNOSIS — Z79.64 ENCOUNTER FOR MONITORING OF HYDROXYUREA THERAPY: ICD-10-CM

## 2025-06-12 DIAGNOSIS — D45 POLYCYTHEMIA VERA (HCC): Primary | ICD-10-CM

## 2025-06-12 DIAGNOSIS — D75.89 MACROCYTOSIS WITHOUT ANEMIA: ICD-10-CM

## 2025-06-12 LAB
ALBUMIN SERPL-MCNC: 4.1 G/DL (ref 3.2–4.8)
ALBUMIN/GLOB SERPL: 1.5 {RATIO} (ref 1–2)
ALP LIVER SERPL-CCNC: 83 U/L (ref 55–142)
ALT SERPL-CCNC: 13 U/L (ref 10–49)
ANION GAP SERPL CALC-SCNC: 8 MMOL/L (ref 0–18)
AST SERPL-CCNC: 19 U/L (ref ?–34)
BASOPHILS # BLD AUTO: 0.08 X10(3) UL (ref 0–0.2)
BASOPHILS NFR BLD AUTO: 1.3 %
BILIRUB SERPL-MCNC: 0.6 MG/DL (ref 0.2–1.1)
BUN BLD-MCNC: 13 MG/DL (ref 9–23)
BUN/CREAT SERPL: 14 (ref 10–20)
CALCIUM BLD-MCNC: 9.4 MG/DL (ref 8.7–10.4)
CHLORIDE SERPL-SCNC: 106 MMOL/L (ref 98–112)
CO2 SERPL-SCNC: 27 MMOL/L (ref 21–32)
CREAT BLD-MCNC: 0.93 MG/DL (ref 0.55–1.02)
DEPRECATED RDW RBC AUTO: 71.5 FL (ref 35.1–46.3)
EGFRCR SERPLBLD CKD-EPI 2021: 63 ML/MIN/1.73M2 (ref 60–?)
EOSINOPHIL # BLD AUTO: 0.13 X10(3) UL (ref 0–0.7)
EOSINOPHIL NFR BLD AUTO: 2.1 %
ERYTHROCYTE [DISTWIDTH] IN BLOOD BY AUTOMATED COUNT: 17.7 % (ref 11–15)
GLOBULIN PLAS-MCNC: 2.8 G/DL (ref 2–3.5)
GLUCOSE BLD-MCNC: 136 MG/DL (ref 70–99)
HCT VFR BLD AUTO: 38.8 % (ref 35–48)
HGB BLD-MCNC: 12.7 G/DL (ref 12–16)
IMM GRANULOCYTES # BLD AUTO: 0.04 X10(3) UL (ref 0–1)
IMM GRANULOCYTES NFR BLD: 0.6 %
LYMPHOCYTES # BLD AUTO: 1.56 X10(3) UL (ref 1–4)
LYMPHOCYTES NFR BLD AUTO: 25 %
MCH RBC QN AUTO: 35.5 PG (ref 26–34)
MCHC RBC AUTO-ENTMCNC: 32.7 G/DL (ref 31–37)
MCV RBC AUTO: 108.4 FL (ref 80–100)
MONOCYTES # BLD AUTO: 0.7 X10(3) UL (ref 0.1–1)
MONOCYTES NFR BLD AUTO: 11.2 %
NEUTROPHILS # BLD AUTO: 3.73 X10 (3) UL (ref 1.5–7.7)
NEUTROPHILS # BLD AUTO: 3.73 X10(3) UL (ref 1.5–7.7)
NEUTROPHILS NFR BLD AUTO: 59.8 %
OSMOLALITY SERPL CALC.SUM OF ELEC: 294 MOSM/KG (ref 275–295)
PLATELET # BLD AUTO: 429 10(3)UL (ref 150–450)
PLATELET MORPHOLOGY: NORMAL
POTASSIUM SERPL-SCNC: 4.1 MMOL/L (ref 3.5–5.1)
PROT SERPL-MCNC: 6.9 G/DL (ref 5.7–8.2)
RBC # BLD AUTO: 3.58 X10(6)UL (ref 3.8–5.3)
SODIUM SERPL-SCNC: 141 MMOL/L (ref 136–145)
WBC # BLD AUTO: 6.2 X10(3) UL (ref 4–11)

## 2025-06-12 RX ORDER — DOXYCYCLINE 100 MG/1
100 CAPSULE ORAL 2 TIMES DAILY
COMMUNITY

## 2025-06-12 NOTE — PATIENT INSTRUCTIONS
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 We made some adjustments to your medication and planned follow-up steps.    YOUR PLAN:  POLYCYTHEMIA VERA: Your polycythemia vera is well-controlled with Hydroxyurea, but you have experienced some skin discoloration as a side effect.  -Reduce Hydroxyurea dose to 500 mg in the morning and 500 mg in the evening.  -Recheck labs in 3 months to monitor blood counts.  -Schedule a follow-up appointment in 3 months to assess response to dose adjustment.      Contains text generated by Carrie

## 2025-06-12 NOTE — PROGRESS NOTES
[facility] Hematology Oncology   Progress Note    Patient Name: Corrine Keen   YOB: 1945   Medical Record Number: [id number]   Attending Physician: Renetta Vicente MD     Date of Visit: 6/12/2025     Corrine Keen verbally consented to be recorded using ambient AI listening technology and understand that they can each withdraw their consent to this listening technology at any point by asking the clinician to turn off or pause the recording.      Chief Complaint:  MPN, JAK2 + PV  hydrea monitoring    Oncologic History:  79 year old seen for leukocytosis and thrombocytosis in the setting of tongue swelling and allergic reaction.  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 3/2023: CAD s/p stent placement    Interval History:  History of Present Illness  Corrine Keen is a 79 year old female with history of P Vera who presents for follow-up regarding Hydrea treatment.    She has been on Hydrea since 2022, taking 1000 mg in the morning and 500 mg in the evening. She occasionally misses doses, particularly the afternoon dose. She has noticed skin discoloration on her feet and hands, which is bothering her. She underwent phlebotomy once at the beginning of her treatment.    She experiences left shoulder pain, diagnosed as arthritis, and recent back pain associated with a bacterial skin infection.  No ulcers on her legs or skin cancer since starting Hydrea.    No new complaints, changes, itching, swelling, or body pain aside from the shoulder and back issues.     She denies fevers, night sweats, fatigue, weight loss. Blood pressure and glucose are better controlled.       PMH: DM, HTN  PSH: Prior D&C, cataract, right breast biopsy 2015  Family History: neg for cancer, sister with MDS  Social History: non smoker.     Current Outpatient Medications:     doxycycline 100 MG Oral Cap, Take 1 capsule (100 mg total) by mouth 2 (two) times daily., Disp: , Rfl:     mupirocin 2 % External  Ointment, Apply 1 Application topically 3 (three) times daily for 14 days., Disp: 30 g, Rfl: 0    metoprolol succinate  MG Oral Tablet 24 Hr, Take 1 tablet (100 mg total) by mouth daily., Disp: 90 tablet, Rfl: 3    pantoprazole 40 MG Oral Tab EC, Take 1 tablet (40 mg total) by mouth daily., Disp: 90 tablet, Rfl: 3    linaGLIPtin (TRADJENTA) 5 mg Oral Tab, Take 1 tablet (5 mg total) by mouth daily., Disp: 90 tablet, Rfl: 1    Glucose Blood (ACCU-CHEK GUIDE TEST) In Vitro Strip, Check blood sugar once a day, Disp: 100 each, Rfl: 1    Flaxseed, Linseed, (FLAXSEED OIL OR), Take by mouth., Disp: , Rfl:     rosuvastatin 5 MG Oral Tab, Take 1 tablet (5 mg total) by mouth nightly., Disp: 90 tablet, Rfl: 3    metFORMIN HCl 1000 MG Oral Tab, Take 1 tablet (1,000 mg total) by mouth 2 (two) times daily., Disp: 180 tablet, Rfl: 3    hydroxyurea 500 MG Oral Cap, TAKE 2 CAPSULES IN THE MORNING, AND 1 CAPSULE IN THE EVENING FOR A TOTAL OF 1500 MG DAILY., Disp: 270 capsule, Rfl: 5    ACCU-CHEK GUIDE In Vitro Strip, CHECK BLOOD SUGAR ONCE A DAY, Disp: 100 strip, Rfl: 0    amLODIPine 10 MG Oral Tab, Take 1 tablet (10 mg total) by mouth daily., Disp: 90 tablet, Rfl: 0    COD LIVER OIL OR, Take by mouth., Disp: , Rfl:     folic acid 400 MCG Oral Tab, Take 1 tablet (400 mcg total) by mouth in the morning., Disp: , Rfl:     losartan 50 MG Oral Tab, Take 1 tablet (50 mg total) by mouth in the morning., Disp: , Rfl:     Accu-Chek Softclix Lancets Does not apply Misc, Use to check blood sugars once daily, Disp: 100 each, Rfl: 0    Blood Glucose Monitoring Suppl (ACCU-CHEK GUIDE) w/Device Does not apply Kit, Check blood sugar once a day, Disp: 1 kit, Rfl: 0    Hydroquinone 4 % External Cream, Apply 1 Application. topically 2 (two) times daily. Use bid as directed to hands, Disp: 30 g, Rfl: 1    Cholecalciferol (VITAMIN D) 50 MCG (2000 UT) Oral Tab, Take 1 tablet by mouth in the morning., Disp: , Rfl:     aspirin 81 MG Oral Chew Tab,  Chew 1 tablet (81 mg total) by mouth in the morning., Disp: , Rfl:     BITTER MELON OR, Take by mouth., Disp: , Rfl:     Review of Systems:  Oncology specific ROS negative except as per HPI    Vital Signs:  /83 (BP Location: Left arm, Patient Position: Sitting, Cuff Size: adult)   Pulse 73   Temp 97.5 °F (36.4 °C) (Tympanic)   Resp 18   Ht 1.613 m (5' 3.5\")   Wt 89.4 kg (197 lb)   SpO2 96%   BMI 34.35 kg/m²       General: Patient is alert and oriented x 3, not in acute distress.  Neck: ROM intact. No masses.  Chest: Symmetric expansion, clear bilaterally  Abd: Soft, non tender  Extremities: No edema, cyanosis, or bruising.   Neurological: Motor strength grossly intact, MA4E  Skin: dry scaly skin. Hyperpigmented skin of bilateral hands.    Psych: Appropriate mood and affect      Labs:  Lab Results   Component Value Date    WBC 6.2 06/12/2025    RBC 3.58 (L) 06/12/2025    HGB 12.7 06/12/2025    HCT 38.8 06/12/2025    .4 (H) 06/12/2025    MCH 35.5 (H) 06/12/2025    MCHC 32.7 06/12/2025    RDW 17.7 (H) 06/12/2025    .0 06/12/2025     Lab Results   Component Value Date/Time    HGB 12.7 06/12/2025 09:40 AM    HGB 13.8 12/12/2024 09:55 AM    HGB 12.5 06/10/2024 08:07 AM    HGB 13.5 02/18/2024 04:57 AM    HGB 12.8 12/11/2023 08:04 AM    HGB 12.9 09/11/2023 08:35 AM    HGB 12.3 06/26/2023 12:56 PM    HGB 12.8 03/21/2023 10:06 AM    HGB 12.9 01/02/2023 01:28 AM    HGB 14.4 12/29/2022 06:39 AM     Lab Results   Component Value Date     06/12/2025    K 4.1 06/12/2025     06/12/2025    CO2 27.0 06/12/2025    BUN 13 06/12/2025    CREATSERUM 0.93 06/12/2025     (H) 06/12/2025    CA 9.4 06/12/2025    ALKPHO 83 06/12/2025    ALT 13 06/12/2025    AST 19 06/12/2025    BILT 0.6 06/12/2025    ALB 4.1 06/12/2025    TP 6.9 06/12/2025        Impression and Plan:    Polycythemia vera, JAK2 V617F positive  - high risk due to age and CAD.   - dose reduce hydrea to 500 mg bid due to skin atrophy  and hyperpigmentation. Her CBC is normal and stable since 2022.   - return to clinic in 3 months with repeat labs.   - continue ASA 81 mg daily. Continue folic acid with hydrea  - discussed again goals of therapy in PV and small risk of transformation to MF and AML.   - macrocytosis is due to hydrea. Continue to monitor      Return in about 3 months (around 9/12/2025).       Renetta Vicente MD  Hematology Oncology

## 2025-06-15 ENCOUNTER — HOSPITAL ENCOUNTER (EMERGENCY)
Facility: HOSPITAL | Age: 80
Discharge: HOME | End: 2025-06-15
Attending: EMERGENCY MEDICINE
Payer: MEDICARE

## 2025-06-15 VITALS
DIASTOLIC BLOOD PRESSURE: 79 MMHG | TEMPERATURE: 99 F | OXYGEN SATURATION: 95 % | SYSTOLIC BLOOD PRESSURE: 146 MMHG | HEIGHT: 63 IN | BODY MASS INDEX: 34.91 KG/M2 | RESPIRATION RATE: 18 BRPM | HEART RATE: 73 BPM | WEIGHT: 197 LBS

## 2025-06-15 DIAGNOSIS — T78.3XXA ANGIOEDEMA, INITIAL ENCOUNTER: Primary | ICD-10-CM

## 2025-06-15 LAB
ANION GAP SERPL CALC-SCNC: 4 MMOL/L (ref 0–18)
BASOPHILS # BLD AUTO: 0.04 X10(3) UL (ref 0–0.2)
BASOPHILS NFR BLD AUTO: 0.7 %
BUN BLD-MCNC: 12 MG/DL (ref 9–23)
BUN/CREAT SERPL: 12.4 (ref 10–20)
CALCIUM BLD-MCNC: 9.7 MG/DL (ref 8.7–10.4)
CHLORIDE SERPL-SCNC: 105 MMOL/L (ref 98–112)
CO2 SERPL-SCNC: 28 MMOL/L (ref 21–32)
CREAT BLD-MCNC: 0.97 MG/DL (ref 0.55–1.02)
DEPRECATED RDW RBC AUTO: 69.5 FL (ref 35.1–46.3)
EGFRCR SERPLBLD CKD-EPI 2021: 60 ML/MIN/1.73M2 (ref 60–?)
EOSINOPHIL # BLD AUTO: 0.08 X10(3) UL (ref 0–0.7)
EOSINOPHIL NFR BLD AUTO: 1.4 %
ERYTHROCYTE [DISTWIDTH] IN BLOOD BY AUTOMATED COUNT: 17.5 % (ref 11–15)
GLUCOSE BLD-MCNC: 148 MG/DL (ref 70–99)
HCT VFR BLD AUTO: 40 % (ref 35–48)
HGB BLD-MCNC: 13.3 G/DL (ref 12–16)
IMM GRANULOCYTES # BLD AUTO: 0.19 X10(3) UL (ref 0–1)
IMM GRANULOCYTES NFR BLD: 3.4 %
LYMPHOCYTES # BLD AUTO: 1.22 X10(3) UL (ref 1–4)
LYMPHOCYTES NFR BLD AUTO: 21.7 %
MCH RBC QN AUTO: 35.7 PG (ref 26–34)
MCHC RBC AUTO-ENTMCNC: 33.3 G/DL (ref 31–37)
MCV RBC AUTO: 107.2 FL (ref 80–100)
MONOCYTES # BLD AUTO: 0.29 X10(3) UL (ref 0.1–1)
MONOCYTES NFR BLD AUTO: 5.2 %
NEUTROPHILS # BLD AUTO: 3.81 X10 (3) UL (ref 1.5–7.7)
NEUTROPHILS # BLD AUTO: 3.81 X10(3) UL (ref 1.5–7.7)
NEUTROPHILS NFR BLD AUTO: 67.6 %
OSMOLALITY SERPL CALC.SUM OF ELEC: 287 MOSM/KG (ref 275–295)
PLATELET # BLD AUTO: 471 10(3)UL (ref 150–450)
POTASSIUM SERPL-SCNC: 4.4 MMOL/L (ref 3.5–5.1)
RBC # BLD AUTO: 3.73 X10(6)UL (ref 3.8–5.3)
SODIUM SERPL-SCNC: 137 MMOL/L (ref 136–145)
WBC # BLD AUTO: 5.6 X10(3) UL (ref 4–11)

## 2025-06-15 PROCEDURE — 85025 COMPLETE CBC W/AUTO DIFF WBC: CPT | Performed by: EMERGENCY MEDICINE

## 2025-06-15 PROCEDURE — 99284 EMERGENCY DEPT VISIT MOD MDM: CPT

## 2025-06-15 PROCEDURE — 96375 TX/PRO/DX INJ NEW DRUG ADDON: CPT

## 2025-06-15 PROCEDURE — 80048 BASIC METABOLIC PNL TOTAL CA: CPT | Performed by: EMERGENCY MEDICINE

## 2025-06-15 PROCEDURE — 96374 THER/PROPH/DIAG INJ IV PUSH: CPT

## 2025-06-15 RX ORDER — PREDNISONE 20 MG/1
40 TABLET ORAL DAILY
Qty: 4 TABLET | Refills: 0 | Status: SHIPPED | OUTPATIENT
Start: 2025-06-15 | End: 2025-06-17

## 2025-06-15 RX ORDER — METHYLPREDNISOLONE SODIUM SUCCINATE 125 MG/2ML
125 INJECTION INTRAMUSCULAR; INTRAVENOUS ONCE
Status: COMPLETED | OUTPATIENT
Start: 2025-06-15 | End: 2025-06-15

## 2025-06-15 RX ORDER — FAMOTIDINE 10 MG/ML
20 INJECTION, SOLUTION INTRAVENOUS ONCE
Status: COMPLETED | OUTPATIENT
Start: 2025-06-15 | End: 2025-06-15

## 2025-06-15 RX ORDER — DIPHENHYDRAMINE HYDROCHLORIDE 50 MG/ML
25 INJECTION, SOLUTION INTRAMUSCULAR; INTRAVENOUS ONCE
Status: COMPLETED | OUTPATIENT
Start: 2025-06-15 | End: 2025-06-15

## 2025-06-15 NOTE — ED PROVIDER NOTES
Patient Seen in: [facility] Emergency Department        History  Chief Complaint   Patient presents with    Allergic Rxn Allergies     Stated Complaint: Allergic rxn since last night    Subjective:   HPI            The patient is a 79-year-old female with a history of hypertension diabetes and polycythemia vera who presents with swelling to her lower lip and face since yesterday.  She denies any new foods or medications.  She had this once before with tongue swelling with lisinopril and none since.  She is currently on amlodipine for her blood pressure.  No itching.  No tongue or throat swelling.  No difficulty breathing or swallowing.  No nausea or vomiting.  No other rash or itching.  She did complete a course of doxycycline for skin infection last week but has not had it in 5 days      Objective:     Past Medical History:    Diabetes (HCC)    Elevated troponin    Hypertension    Polycythemia vera (HCC)              Past Surgical History:   Procedure Laterality Date    Cataract extraction  2019    Right eye     Dilation/curettage,diagnostic      Sara biopsy stereo nodule 1 site right (cpt=19081)      2015? results benign                Social History     Socioeconomic History    Marital status: Single   Tobacco Use    Smoking status: Never    Smokeless tobacco: Never   Vaping Use    Vaping status: Never Used   Substance and Sexual Activity    Alcohol use: Not Currently     Comment: socially drinks     Drug use: Never    Sexual activity: Yes   Other Topics Concern    Reaction to local anesthetic No    Pt has a pacemaker No    Pt has a defibrillator No     Social Drivers of Health     Food Insecurity: No Food Insecurity (2/24/2025)    NCSS - Food Insecurity     Worried About Running Out of Food in the Last Year: No     Ran Out of Food in the Last Year: No   Transportation Needs: No Transportation Needs (2/24/2025)    NCSS - Transportation     Lack of Transportation: No   Housing Stability: Not At Risk  (2/24/2025)    NCSS - Housing/Utilities     Has Housing: Yes     Worried About Losing Housing: No     Unable to Get Utilities: No                                Physical Exam    ED Triage Vitals [06/15/25 0842]   /81   Pulse 83   Resp 18   Temp 98.8 °F (37.1 °C)   Temp src Temporal   SpO2 98 %   O2 Device None (Room air)       Current Vitals:   Vital Signs  BP: (!) 158/96  Pulse: 88  Resp: 16  Temp: 98.8 °F (37.1 °C)  Temp src: Temporal  MAP (mmHg): (!) 115    Oxygen Therapy  SpO2: 98 %  O2 Device: None (Room air)            Physical Exam  Vitals and nursing note reviewed.   Constitutional:       General: She is not in acute distress.     Appearance: Normal appearance. She is well-developed. She is not ill-appearing.   HENT:      Head: Normocephalic and atraumatic.      Jaw: There is normal jaw occlusion.      Salivary Glands: Right salivary gland is not diffusely enlarged or tender. Left salivary gland is not diffusely enlarged or tender.        Nose: Nose normal.      Mouth/Throat:      Mouth: Mucous membranes are moist.      Pharynx: No oropharyngeal exudate or posterior oropharyngeal erythema.   Eyes:      Conjunctiva/sclera: Conjunctivae normal.      Pupils: Pupils are equal, round, and reactive to light.   Neck:      Vascular: No JVD.   Cardiovascular:      Rate and Rhythm: Normal rate and regular rhythm.      Heart sounds: Normal heart sounds. No murmur heard.  Pulmonary:      Effort: Pulmonary effort is normal.      Breath sounds: Normal breath sounds. No stridor. No wheezing.   Abdominal:      General: Bowel sounds are normal. There is no distension.      Palpations: Abdomen is soft. There is no mass.      Tenderness: There is no abdominal tenderness. There is no guarding or rebound.   Musculoskeletal:         General: Normal range of motion.      Right lower leg: No edema.      Left lower leg: No edema.   Skin:     General: Skin is warm and dry.      Capillary Refill: Capillary refill takes less than  2 seconds.      Findings: No erythema or rash.   Neurological:      General: No focal deficit present.      Mental Status: She is alert and oriented to person, place, and time.      Deep Tendon Reflexes: Reflexes are normal and symmetric.   Psychiatric:         Judgment: Judgment normal.       Differential diagnosis includes angioedema, other allergic reaction        ED Course  Labs Reviewed   BASIC METABOLIC PANEL (8) - Abnormal; Notable for the following components:       Result Value    Glucose 148 (*)     All other components within normal limits   CBC WITH DIFFERENTIAL WITH PLATELET - Abnormal; Notable for the following components:    RBC 3.73 (*)     .2 (*)     MCH 35.7 (*)     RDW-SD 69.5 (*)     RDW 17.5 (*)     .0 (*)     All other components within normal limits          Pulse ox 97% on room air, normal                  MDM             Medical Decision Making  Swelling improved and patient feels better after Solu-Medrol Pepcid and Benadryl  No tongue or throat swelling  Patient to follow-up with her doctor etiology unclear  Continue Benadryl prednisone  Vital signs stable prior to discharge    Problems Addressed:  Angioedema, initial encounter: acute illness or injury    Amount and/or Complexity of Data Reviewed  External Data Reviewed: notes.     Details: From hematology on 6/12/2025 regarding treatment for polycythemia vera  Labs: ordered.    Risk  Prescription drug management.  Risk Details: Dosage and side effect discussed with patient        Disposition and Plan     Clinical Impression:  1. Angioedema, initial encounter         Disposition:  Discharge  6/15/2025 12:15 pm    Follow-up:  Jer Steen MD  [address] [place] [id number]  [phone]    Schedule an appointment as soon as possible for a visit in 2 day(s)      We recommend that you schedule follow up care with a primary care provider within the next three months to obtain basic health screening including  reassessment of your blood pressure.      Medications Prescribed:  Current Discharge Medication List        START taking these medications    Details   predniSONE 20 MG Oral Tab Take 2 tablets (40 mg total) by mouth daily for 2 days.  Qty: 4 tablet, Refills: 0                   Supplementary Documentation:

## 2025-06-15 NOTE — DISCHARGE INSTRUCTIONS
Continue Benadryl 1 tablet every 4-6 hours  Take prednisone as directed and watch your carbohydrate intake and your blood glucose levels while on prednisone as it may elevate  Follow-up with your doctor  Call 911 or return if any tongue swelling throat swelling or difficulty breathing

## 2025-06-15 NOTE — ED INITIAL ASSESSMENT (HPI)
Patient to ED from home for allergic rxn. Endorses swelling to face and lips. Denies swelling to tongue. No new known exposures. Took 25mg benadryl yesterday; nothing today. Denies lia, airway in tact.

## 2025-07-17 ENCOUNTER — OFFICE VISIT (OUTPATIENT)
Dept: INTERNAL MEDICINE CLINIC | Facility: CLINIC | Age: 80
End: 2025-07-17
Payer: MEDICARE

## 2025-07-17 VITALS
HEART RATE: 81 BPM | SYSTOLIC BLOOD PRESSURE: 124 MMHG | HEIGHT: 63 IN | DIASTOLIC BLOOD PRESSURE: 62 MMHG | TEMPERATURE: 97 F | OXYGEN SATURATION: 98 % | BODY MASS INDEX: 35.26 KG/M2 | WEIGHT: 199 LBS

## 2025-07-17 DIAGNOSIS — E11.9 TYPE 2 DIABETES MELLITUS WITHOUT COMPLICATION, WITHOUT LONG-TERM CURRENT USE OF INSULIN (HCC): Primary | ICD-10-CM

## 2025-07-17 DIAGNOSIS — T78.40XS ALLERGIC REACTION, SEQUELA: ICD-10-CM

## 2025-07-17 DIAGNOSIS — I10 HYPERTENSION, UNSPECIFIED TYPE: ICD-10-CM

## 2025-07-17 LAB
CREAT UR-SCNC: 134.5 MG/DL
MICROALBUMIN UR-MCNC: <0.3 MG/DL

## 2025-07-17 PROCEDURE — G2211 COMPLEX E/M VISIT ADD ON: HCPCS | Performed by: INTERNAL MEDICINE

## 2025-07-17 PROCEDURE — 3074F SYST BP LT 130 MM HG: CPT | Performed by: INTERNAL MEDICINE

## 2025-07-17 PROCEDURE — 3008F BODY MASS INDEX DOCD: CPT | Performed by: INTERNAL MEDICINE

## 2025-07-17 PROCEDURE — 3078F DIAST BP <80 MM HG: CPT | Performed by: INTERNAL MEDICINE

## 2025-07-17 PROCEDURE — 1159F MED LIST DOCD IN RCRD: CPT | Performed by: INTERNAL MEDICINE

## 2025-07-17 PROCEDURE — 1125F AMNT PAIN NOTED PAIN PRSNT: CPT | Performed by: INTERNAL MEDICINE

## 2025-07-17 PROCEDURE — 99214 OFFICE O/P EST MOD 30 MIN: CPT | Performed by: INTERNAL MEDICINE

## 2025-07-17 RX ORDER — HYDRALAZINE HYDROCHLORIDE 25 MG/1
25 TABLET, FILM COATED ORAL 2 TIMES DAILY
Qty: 180 TABLET | Refills: 0 | Status: SHIPPED | OUTPATIENT
Start: 2025-07-17 | End: 2025-10-15

## 2025-07-17 RX ORDER — LINAGLIPTIN 5 MG/1
5 TABLET, FILM COATED ORAL DAILY
Qty: 90 TABLET | Refills: 1 | Status: SHIPPED | OUTPATIENT
Start: 2025-07-17

## 2025-07-17 RX ORDER — EPINEPHRINE 0.3 MG/.3ML
0.3 INJECTION SUBCUTANEOUS ONCE
Qty: 1 EACH | Refills: 0 | Status: SHIPPED | OUTPATIENT
Start: 2025-07-17 | End: 2025-07-17

## 2025-07-17 NOTE — PROGRESS NOTES
The following individual(s) verbally consented to be recorded using ambient AI listening technology and understand that they can each withdraw their consent to this listening technology at any point by asking the clinician to turn off or pause the recording: Yes    Patient name: Corrine Keen  Additional names:

## 2025-07-17 NOTE — PROGRESS NOTES
Corrine Keen is a 80 year old female.  Chief Complaint   Patient presents with    Swelling     Pt has been noticing swelling in different areas of her body     HPI:        Corrine Keen is a 80 year old female with diabetes and hypertension who presents with episodes of swelling and itching and for follow-up.    Swelling and itching have been present for two to three weeks, initially affecting her lips and face, now appearing as patches on different body parts. Avoidance of tomatoes has not alleviated symptoms. Benadryl is used for symptom relief.    She experienced a similar reaction to lisinopril in the past, which caused tongue swelling. Currently, she is on losartan without previous issues, and her tongue has not swelled this time.    Her medications include Tradjenta 5 mg, which she missed for a few days, and metformin 1000 mg twice daily for diabetes. For hypertension, she takes amlodipine, losartan, and metoprolol.       Current Medications[1]   Past Medical History[2]   Past Surgical History[3]   Social History:  Short Social Hx on File[4]   Family History[5]   Allergies[6]     REVIEW OF SYSTEMS:   Review of Systems   Review of Systems   Constitutional: Negative for activity change, appetite change and fever.   HENT: Negative for congestion and voice change.    Respiratory: Negative for cough and shortness of breath.    Cardiovascular: Negative for chest pain.   Gastrointestinal: Negative for abdominal distention, abdominal pain and vomiting.   Genitourinary: Negative for hematuria.   Skin: Negative for wound.   Psychiatric/Behavioral: Negative for behavioral problems.   Wt Readings from Last 5 Encounters:   07/17/25 199 lb (90.3 kg)   06/15/25 197 lb (89.4 kg)   06/12/25 197 lb (89.4 kg)   06/04/25 195 lb (88.5 kg)   03/21/25 197 lb (89.4 kg)     Body mass index is 35.25 kg/m².      EXAM:   /62   Pulse 81   Temp 96.7 °F (35.9 °C) (Temporal)   Ht 5' 3\" (1.6 m)   Wt 199 lb (90.3 kg)   SpO2 98%    BMI 35.25 kg/m²   Physical Exam   Constitutional:       Appearance: Normal appearance.   HENT:      Head: Normocephalic.   Eyes:      Conjunctiva/sclera: Conjunctivae normal.   Cardiovascular:      Rate and Rhythm: Normal rate and regular rhythm.      Heart sounds: Normal heart sounds. No murmur heard.  Pulmonary:      Effort: Pulmonary effort is normal.      Breath sounds: Normal breath sounds. No rhonchi or rales.   Abdominal:      General: Bowel sounds are normal.      Palpations: Abdomen is soft.      Tenderness: There is no abdominal tenderness.   Musculoskeletal:      Cervical back: Neck supple.      Right lower leg: No edema.      Left lower leg: No edema.   Skin:     General: Skin is warm and dry.   Neurological:      General: No focal deficit present.      Mental Status: He is alert and oriented to person, place, and time. Mental status is at baseline.   Psychiatric:         Mood and Affect: Mood normal.         Behavior: Behavior normal.       ASSESSMENT AND PLAN:   1. Type 2 diabetes mellitus without complication, without long-term current use of insulin (HCC)    - Microalb/Creat Ratio, Random Urine    2. Hypertension, unspecified type      3. Allergic reaction, sequela    - EPINEPHrine (EPIPEN 2-SKIP) 0.3 MG/0.3ML Injection Solution Auto-injector; Inject 0.3 mL (1 each total) as directed one time for 1 dose. \"Epinephrine auto-injectors \"generic can be substituted.  Dispense: 1 each; Refill: 0  - Allergy Referral - In Network       Hypertension  Hypertension managed with amlodipine and metoprolol. Losartan discontinued due to risk of angioedema.  - Discontinue losartan.  - Initiate hydralazine 25 mg orally twice daily.  - Monitor blood pressure closely.  - Provide EpiPen for emergency use.  - Refer to allergist for evaluation.    Allergic Reaction  Intermittent lip and facial swelling likely related to losartan. EpiPen provided for potential angioedema.  - Discontinue losartan and switch to  hydralazine.  - Provide EpiPen for emergency use.  - Refer to allergist for evaluation and possible allergy testing.    Coronary Artery Disease with Stable Angina  Coronary artery disease stable with metoprolol.  - Continue aspirin and statins    Diabetes Mellitus Type 2  Diabetes generally well-controlled. Tradjenta refill needed.  - Send prescription for Tradjenta to pharmacy.  - Continue Metformin 1000 mg orally twice daily.  Continue to monitor hemoglobin A1c.  Continue statins.  She has completed eye exam 2 days back.  We will try to obtain the report.         Plan: As above.  Losartan discontinued because of lip swelling.  Hydralazine initiated.  She can check blood pressure in end of office in 3 days.  If it is high, she will contact me.  Allergy referral given.  Check urine for microalbumin today.  I will see her back in 4 months      The patient indicates understanding of these issues and agrees to the plan.  No follow-ups on file.    This note was prepared using Dragon Medical voice recognition dictation software. As a result errors may occur. When identified these errors have been corrected. While every attempt is made to correct errors during dictation discrepancies may still exist.         [1]   Current Outpatient Medications   Medication Sig Dispense Refill    linaGLIPtin (TRADJENTA) 5 mg Oral Tab Take 1 tablet (5 mg total) by mouth daily. 90 tablet 1    EPINEPHrine (EPIPEN 2-SKIP) 0.3 MG/0.3ML Injection Solution Auto-injector Inject 0.3 mL (1 each total) as directed one time for 1 dose. \"Epinephrine auto-injectors \"generic can be substituted. 1 each 0    hydrALAZINE 25 MG Oral Tab Take 1 tablet (25 mg total) by mouth in the morning and 1 tablet (25 mg total) before bedtime. 180 tablet 0    metoprolol succinate  MG Oral Tablet 24 Hr Take 1 tablet (100 mg total) by mouth daily. 90 tablet 3    pantoprazole 40 MG Oral Tab EC Take 1 tablet (40 mg total) by mouth daily. 90 tablet 3    Glucose Blood  (ACCU-CHEK GUIDE TEST) In Vitro Strip Check blood sugar once a day 100 each 1    rosuvastatin 5 MG Oral Tab Take 1 tablet (5 mg total) by mouth nightly. 90 tablet 3    metFORMIN HCl 1000 MG Oral Tab Take 1 tablet (1,000 mg total) by mouth 2 (two) times daily. 180 tablet 3    hydroxyurea 500 MG Oral Cap TAKE 2 CAPSULES IN THE MORNING, AND 1 CAPSULE IN THE EVENING FOR A TOTAL OF 1500 MG DAILY. 270 capsule 5    ACCU-CHEK GUIDE In Vitro Strip CHECK BLOOD SUGAR ONCE A  strip 0    amLODIPine 10 MG Oral Tab Take 1 tablet (10 mg total) by mouth daily. 90 tablet 0    COD LIVER OIL OR Take by mouth.      folic acid 400 MCG Oral Tab Take 1 tablet (400 mcg total) by mouth in the morning.      Accu-Chek Softclix Lancets Does not apply Misc Use to check blood sugars once daily 100 each 0    Blood Glucose Monitoring Suppl (ACCU-CHEK GUIDE) w/Device Does not apply Kit Check blood sugar once a day 1 kit 0    Cholecalciferol (VITAMIN D) 50 MCG (2000 UT) Oral Tab Take 1 tablet by mouth in the morning.      aspirin 81 MG Oral Chew Tab Chew 1 tablet (81 mg total) by mouth in the morning.      Flaxseed, Linseed, (FLAXSEED OIL OR) Take by mouth.      Hydroquinone 4 % External Cream Apply 1 Application. topically 2 (two) times daily. Use bid as directed to hands (Patient not taking: Reported on 7/17/2025) 30 g 1   [2]   Past Medical History:   Diabetes (HCC)    Elevated troponin    Hypertension    Polycythemia vera (HCC)   [3]   Past Surgical History:  Procedure Laterality Date    Cataract extraction  2019    Right eye     Dilation/curettage,diagnostic      Sara biopsy stereo nodule 1 site right (cpt=19081)      2015? results benign   [4]   Social History  Socioeconomic History    Marital status: Single   Tobacco Use    Smoking status: Never    Smokeless tobacco: Never   Vaping Use    Vaping status: Never Used   Substance and Sexual Activity    Alcohol use: Not Currently     Comment: socially drinks     Drug use: Never    Sexual  activity: Yes   Other Topics Concern    Reaction to local anesthetic No    Pt has a pacemaker No    Pt has a defibrillator No     Social Drivers of Health     Food Insecurity: No Food Insecurity (2/24/2025)    NCSS - Food Insecurity     Worried About Running Out of Food in the Last Year: No     Ran Out of Food in the Last Year: No   Transportation Needs: No Transportation Needs (2/24/2025)    NCSS - Transportation     Lack of Transportation: No   Housing Stability: Not At Risk (2/24/2025)    NCSS - Housing/Utilities     Has Housing: Yes     Worried About Losing Housing: No     Unable to Get Utilities: No   [5]   Family History  Problem Relation Age of Onset    Hypertension Mother     Hypertension Father     Breast Cancer Sister         40's    Breast Cancer Sister 75    Breast Cancer Sister         30's    Prostate Cancer Brother 50 - 59    Prostate Cancer Brother 50 - 59    Prostate Cancer Brother 40 - 49    Ovarian Cancer Neg     Pancreatic Cancer Neg    [6]   Allergies  Allergen Reactions    Lisinopril TONGUE SWELLING

## 2025-07-21 ENCOUNTER — OFFICE VISIT (OUTPATIENT)
Dept: ENDOCRINOLOGY CLINIC | Facility: CLINIC | Age: 80
End: 2025-07-21

## 2025-07-21 ENCOUNTER — PATIENT MESSAGE (OUTPATIENT)
Dept: ENDOCRINOLOGY CLINIC | Facility: CLINIC | Age: 80
End: 2025-07-21

## 2025-07-21 VITALS
HEART RATE: 86 BPM | HEIGHT: 63 IN | DIASTOLIC BLOOD PRESSURE: 75 MMHG | SYSTOLIC BLOOD PRESSURE: 131 MMHG | BODY MASS INDEX: 34.91 KG/M2 | WEIGHT: 197 LBS

## 2025-07-21 DIAGNOSIS — E78.5 DYSLIPIDEMIA: Primary | ICD-10-CM

## 2025-07-21 DIAGNOSIS — E11.69 TYPE 2 DIABETES MELLITUS WITH OTHER SPECIFIED COMPLICATION, UNSPECIFIED WHETHER LONG TERM INSULIN USE (HCC): ICD-10-CM

## 2025-07-21 LAB
GLUCOSE BLOOD: 307
HEMOGLOBIN A1C: 7.8 % (ref 4.3–5.6)
TEST STRIP LOT #: NORMAL NUMERIC

## 2025-07-21 PROCEDURE — 99213 OFFICE O/P EST LOW 20 MIN: CPT | Performed by: INTERNAL MEDICINE

## 2025-07-21 PROCEDURE — 1159F MED LIST DOCD IN RCRD: CPT | Performed by: INTERNAL MEDICINE

## 2025-07-21 PROCEDURE — 3008F BODY MASS INDEX DOCD: CPT | Performed by: INTERNAL MEDICINE

## 2025-07-21 PROCEDURE — 83036 HEMOGLOBIN GLYCOSYLATED A1C: CPT | Performed by: INTERNAL MEDICINE

## 2025-07-21 PROCEDURE — 1160F RVW MEDS BY RX/DR IN RCRD: CPT | Performed by: INTERNAL MEDICINE

## 2025-07-21 PROCEDURE — 82947 ASSAY GLUCOSE BLOOD QUANT: CPT | Performed by: INTERNAL MEDICINE

## 2025-07-21 PROCEDURE — 3075F SYST BP GE 130 - 139MM HG: CPT | Performed by: INTERNAL MEDICINE

## 2025-07-21 PROCEDURE — 3078F DIAST BP <80 MM HG: CPT | Performed by: INTERNAL MEDICINE

## 2025-07-21 NOTE — PROGRESS NOTES
FU    CHIEF COMPLAINT:    Chief Complaint   Patient presents with    Diabetes        HISTORY OF PRESENT ILLNESS:   Corrine Keen is a 80 year old female who is here to FU  for DM.     DM HISTORY  Diagnosed: around age 71      HISTORY OF DIABETES COMPLICATIONS: :  History of Retinopathy: denies - last eye exam: July 2025, Dr. Thomson  History of Neuropathy: denies  History of Nephropathy: Last Ur MA was normal    ASSOCIATED COMPLICATIONS:   HTN: yes  Hyperlipidemia: yes  Coronary Artery Disease:  yes  Cerebrovascular Disease:denies      HOME GLUCOSE READINGS:   Checks BG daily  Fasting : 140-170    No low BG under 70    CURRENT DIABETIC MEDICATIONS INCLUDE:  MTF 1000 mg BID  Linagliptin 5 mg daily         MEALS:  Dietary compliance is moderate    EXERCISE:  Has not been active       CURRENT MEDICATIONS:    Current Outpatient Medications   Medication Sig Dispense Refill    linaGLIPtin (TRADJENTA) 5 mg Oral Tab Take 1 tablet (5 mg total) by mouth daily. 90 tablet 1    metFORMIN HCl 1000 MG Oral Tab Take 1 tablet (1,000 mg total) by mouth 2 (two) times daily. 180 tablet 3    hydrALAZINE 25 MG Oral Tab Take 1 tablet (25 mg total) by mouth in the morning and 1 tablet (25 mg total) before bedtime. 180 tablet 0    metoprolol succinate  MG Oral Tablet 24 Hr Take 1 tablet (100 mg total) by mouth daily. 90 tablet 3    pantoprazole 40 MG Oral Tab EC Take 1 tablet (40 mg total) by mouth daily. 90 tablet 3    Glucose Blood (ACCU-CHEK GUIDE TEST) In Vitro Strip Check blood sugar once a day 100 each 1    Flaxseed, Linseed, (FLAXSEED OIL OR) Take by mouth.      rosuvastatin 5 MG Oral Tab Take 1 tablet (5 mg total) by mouth nightly. 90 tablet 3    hydroxyurea 500 MG Oral Cap TAKE 2 CAPSULES IN THE MORNING, AND 1 CAPSULE IN THE EVENING FOR A TOTAL OF 1500 MG DAILY. 270 capsule 5    ACCU-CHEK GUIDE In Vitro Strip CHECK BLOOD SUGAR ONCE A  strip 0    amLODIPine 10 MG Oral Tab Take 1 tablet (10 mg total) by mouth daily. 90  tablet 0    COD LIVER OIL OR Take by mouth.      folic acid 400 MCG Oral Tab Take 1 tablet (400 mcg total) by mouth in the morning.      Accu-Chek Softclix Lancets Does not apply Misc Use to check blood sugars once daily 100 each 0    Blood Glucose Monitoring Suppl (ACCU-CHEK GUIDE) w/Device Does not apply Kit Check blood sugar once a day 1 kit 0    Hydroquinone 4 % External Cream Apply 1 Application. topically 2 (two) times daily. Use bid as directed to hands (Patient not taking: Reported on 7/17/2025) 30 g 1    Cholecalciferol (VITAMIN D) 50 MCG (2000 UT) Oral Tab Take 1 tablet by mouth in the morning.      aspirin 81 MG Oral Chew Tab Chew 1 tablet (81 mg total) by mouth in the morning.         PAST MEDICAL HISTORY:   Past Medical History:    Diabetes (HCC)    Elevated troponin    Hypertension    Polycythemia vera (HCC)       PAST SURGICAL HISTORY:   Past Surgical History:   Procedure Laterality Date    Cataract extraction  2019    Right eye     Dilation/curettage,diagnostic      Sara biopsy stereo nodule 1 site right (cpt=19081)      2015? results benign       ALLERGIES:  Allergies   Allergen Reactions    Lisinopril TONGUE SWELLING       SOCIAL HISTORY:    Social History     Socioeconomic History    Marital status: Single   Tobacco Use    Smoking status: Never    Smokeless tobacco: Never   Vaping Use    Vaping status: Never Used   Substance and Sexual Activity    Alcohol use: Not Currently     Comment: socially drinks     Drug use: Never    Sexual activity: Yes   Other Topics Concern    Reaction to local anesthetic No    Pt has a pacemaker No    Pt has a defibrillator No       FAMILY HISTORY:   Family History   Problem Relation Age of Onset    Hypertension Mother     Hypertension Father     Breast Cancer Sister         40's    Breast Cancer Sister 75    Breast Cancer Sister         30's    Prostate Cancer Brother 50 - 59    Prostate Cancer Brother 50 - 59    Prostate Cancer Brother 40 - 49    Ovarian Cancer Neg      Pancreatic Cancer Neg        ASSESSMENTS:        REVIEW OF SYSTEMS:  Constitutional: Negative for: weight change, fever, fatigue, cold/heat intolerance  Eyes: Negative for:  Visual changes, proptosis, blurring, floaters, poor night vision, impaired color vision  ENT: Negative for:  dysphagia, neck swelling, dysphonia  Respiratory: Negative for:  dyspnea, cough  Cardiovascular: Negative for:  chest pain, palpitations, orthopnea  GI: Negative for:  abdominal pain, nausea, vomiting, diarrhea, constipation, bleeding  Neurology: Negative for: headache, numbness, weakness,   Genito-Urinary: Negative for: dysuria, frequency  Psychiatric: Negative for:  depression, anxiety  Hematology/Lymphatics: Negative for: bruising, lower extremity edema  Endocrine: Negative for: polyuria, polydypsia  Skin: Negative for: rash, blister,      PHYSICAL EXAM:   Vitals:    07/21/25 1008   BP: 131/75   Pulse: 86   Weight: 197 lb (89.4 kg)   Height: 5' 3\" (1.6 m)     BMI: Body mass index is 34.9 kg/m².         General Appearance:  alert, well developed, in no acute distress  Head: Atraumatic  Eyes:  normal conjunctivae, sclera., normal sclera and normal pupils  Throat/Neck: normal sound to voice. Normal hearing, normal speech  Respiratory:  Speaking in full sentences, non-labored. no increased work of breathing, no audible wheezing    Neuro: motor grossly intact, moving all extremities without difficulty  Psychiatric:  oriented to time, self, and place  Extremities: July 2025   Bilateral barefoot skin diabetic exam is normal, visualized feet and the appearance is normal.  Bilateral monofilament/sensation of both feet is normal.  Pulsation pedal pulse exam of both lower legs/feet is normal as well.          DATA:     Pertinent data reviewed    ASSESSMENT AND PLAN:    1. DM: a1c is 7.8 % ( 7/2025)       Plan:  Discussed the pathogenesis, natural course of diabetes. Patient understands the importance of glycemic control and the implications of  uncontrolled diabetes including Diabetic ketoacidosis and various micro vascular and macrovascular complications.        a). Medications:    A1c is slightly high, but stable  Goal is to get it around 7.5 - 7.7 % ( age appropriate)   She will cpm but work on being more active and   We discussed jardiance again: she will like to hold off at this time If A1c remains elevated, can consider low dose of jardiance      C/w MTF 1000 mg BF and dinner   No GI SE    Linagliptin 5 mg daily     Check and call with Bg as discussed  Can alternate timings  Call if Bg go up  pre meal over 180    b). Urine microalbumin /creatinine normal   c). Discussed importance of annual eye exams  d). Foot exam: Daily feet exam explained  e). BG log maintainence explained in great detail, to get log and glucometer on next visit.  f). Life style changes reviewed  g). Hypoglycemia recognition and management discussed    2. Patient’s BP is  normal today  3. Dyslipidemia  Reports moderate compliance with statin: discussed importance of compliance  A) Discussed lifestyle modifications including reductions in dietary total and saturated fat, weight loss, aerobic exercise, and eating a diet rich in fruits and vegetables.  B) c/w Statin     Patient verbalized a complete  understanding of all of the above and did not have any further questions.     RTC in 4 months      Orders Placed This Encounter   Procedures    POC HemoCue Glucose 201 (Finger stick glucose)    POC Glycohemoglobin [26959]    Lipid Panel [E]         Tierra Jain MD

## 2025-07-26 ENCOUNTER — HOSPITAL ENCOUNTER (EMERGENCY)
Facility: HOSPITAL | Age: 80
Discharge: HOME | End: 2025-07-26
Attending: EMERGENCY MEDICINE

## 2025-07-26 VITALS
OXYGEN SATURATION: 94 % | DIASTOLIC BLOOD PRESSURE: 77 MMHG | SYSTOLIC BLOOD PRESSURE: 149 MMHG | BODY MASS INDEX: 35.35 KG/M2 | HEIGHT: 62.5 IN | TEMPERATURE: 98 F | HEART RATE: 78 BPM | WEIGHT: 197 LBS | RESPIRATION RATE: 19 BRPM

## 2025-07-26 DIAGNOSIS — T78.3XXA ANGIOEDEMA, INITIAL ENCOUNTER: Primary | ICD-10-CM

## 2025-07-26 PROCEDURE — 96374 THER/PROPH/DIAG INJ IV PUSH: CPT

## 2025-07-26 PROCEDURE — 96375 TX/PRO/DX INJ NEW DRUG ADDON: CPT

## 2025-07-26 PROCEDURE — 99284 EMERGENCY DEPT VISIT MOD MDM: CPT

## 2025-07-26 RX ORDER — MONTELUKAST SODIUM 10 MG/1
10 TABLET ORAL NIGHTLY
Qty: 14 TABLET | Refills: 0 | Status: SHIPPED | OUTPATIENT
Start: 2025-07-26 | End: 2025-08-09

## 2025-07-26 RX ORDER — DIPHENHYDRAMINE HYDROCHLORIDE 50 MG/ML
25 INJECTION, SOLUTION INTRAMUSCULAR; INTRAVENOUS ONCE
Status: COMPLETED | OUTPATIENT
Start: 2025-07-26 | End: 2025-07-26

## 2025-07-26 RX ORDER — MONTELUKAST SODIUM 5 MG/1
10 TABLET, CHEWABLE ORAL ONCE
Status: COMPLETED | OUTPATIENT
Start: 2025-07-26 | End: 2025-07-26

## 2025-07-26 RX ORDER — DEXAMETHASONE 4 MG/1
8 TABLET ORAL
Qty: 10 TABLET | Refills: 0 | Status: SHIPPED | OUTPATIENT
Start: 2025-07-26 | End: 2025-07-31

## 2025-07-26 RX ORDER — DEXAMETHASONE SODIUM PHOSPHATE 4 MG/ML
8 VIAL (ML) INJECTION ONCE
Status: COMPLETED | OUTPATIENT
Start: 2025-07-26 | End: 2025-07-26

## 2025-07-26 NOTE — ED INITIAL ASSESSMENT (HPI)
Pt presents for intermittent tongue swelling for the last two weeks, worse since last night. Pt reports no improvement s/p using epipen and benadryl at 0500. Pt presents with tongue very swollen and left upper lip.

## 2025-07-26 NOTE — ED PROVIDER NOTES
Patient Seen in: [facility] Emergency Department    History     Chief Complaint   Patient presents with    Swelling Edema     Stated Complaint: tongur swelling and upper lip    HPI    Pt complains of an allergic reaction that began last night.  Exposure history ? tomoatoes.   Complains of swelling of the tongue and  lips, no difficulty breathing.  Treatment prior to arrival includes epi pen.      Past Medical History[1]    Past Surgical History[2]         Family History[3]    Short Social Hx on File[4]    Review of Systems    Positive for stated complaint: tongur swelling and upper lip  Other systems are as noted in HPI.  Constitutional and vital signs reviewed.      All other systems reviewed and negative except as noted above.    PSFH elements reviewed from today and agreed except as otherwise stated in HPI.    Physical Exam     ED Triage Vitals [07/26/25 0814]   BP (!) 170/77   Pulse 88   Resp 19   Temp 97.8 °F (36.6 °C)   Temp src Oral   SpO2 97 %   O2 Device None (Room air)       Current:/77   Pulse 78   Temp 97.8 °F (36.6 °C) (Oral)   Resp 19   Ht 158.8 cm (5' 2.5\")   Wt 89.4 kg   SpO2 94%   BMI 35.46 kg/m²     PULSE OX nl    GENERAL: awake alert no stridor, l lat upper lip and l distal tongue angioedema, can visualize uvula easily  EYES: PERRLA, EOMI,  ENT: as above  NECK: supple, no meningeal signs  LUNGS: no resp distress, cta bilateral  CARDIO: RRR without murmur  GI: abd soft, ntnd,  EXTREMITIES: moves all 4 spont, no edema, from 5/5 strength  NEURO: alert, oriented x 3, no focal deficits appreciated  SKIN: no rash  PSYCH: calm, cooperative,          ED Course   Labs Reviewed - No data to display    MDM       Monitor Interpretation done by me:  Nsr 78    Re-exam:  Patient medicated no progression of angioedema.  We discussed admission for obs vs going home.  [place] comfortable has family member who will stay with her.  Has fu with allergist.        Medical Decision Making  Problems  Addressed:  Angioedema, initial encounter: acute illness or injury    Risk  Prescription drug management.        Disposition and Plan     Clinical Impression:  1. Angioedema, initial encounter        Disposition:  Discharge    Follow-up:  Jer Steen MD  [address] [place] [address] [id number] [phone]    Follow up        Medications Prescribed:  Discharge Medication List as of 7/26/2025  9:27 AM        START taking these medications    Details   dexamethasone (DECADRON) 4 MG tablet Take 2 tablets (8 mg total) by mouth daily with breakfast for 5 days., Normal, Disp-10 tablet, R-0      montelukast 10 MG Oral Tab Take 1 tablet (10 mg total) by mouth nightly for 14 days., Normal, Disp-14 tablet, R-0                            [1]   Past Medical History:   Diabetes (HCC)    Elevated troponin    Hypertension    Polycythemia vera (HCC)   [2]   Past Surgical History:  Procedure Laterality Date    Cataract extraction  2019    Right eye     Dilation/curettage,diagnostic      Sara biopsy stereo nodule 1 site right (cpt=19081)      2015? results benign   [3]   Family History  Problem Relation Age of Onset    Hypertension Mother     Hypertension Father     Breast Cancer Sister         40's    Breast Cancer Sister 75    Breast Cancer Sister         30's    Prostate Cancer Brother 50 - 59    Prostate Cancer Brother 50 - 59    Prostate Cancer Brother 40 - 49    Ovarian Cancer Neg     Pancreatic Cancer Neg    [4]   Social History  Socioeconomic History    Marital status: Single   Tobacco Use    Smoking status: Never    Smokeless tobacco: Never   Vaping Use    Vaping status: Never Used   Substance and Sexual Activity    Alcohol use: Not Currently     Comment: socially drinks     Drug use: Never    Sexual activity: Yes   Other Topics Concern    Reaction to local anesthetic No    Pt has a pacemaker No    Pt has a defibrillator No     Social Drivers of Health     Food Insecurity: No Food Insecurity (2/24/2025)    NCSS - Food  Insecurity     Worried About Running Out of Food in the Last Year: No     Ran Out of Food in the Last Year: No   Transportation Needs: No Transportation Needs (2/24/2025)    NCSS - Transportation     Lack of Transportation: No   Housing Stability: Not At Risk (2/24/2025)    NCSS - Housing/Utilities     Has Housing: Yes     Worried About Losing Housing: No     Unable to Get Utilities: No

## 2025-08-04 ENCOUNTER — PATIENT OUTREACH (OUTPATIENT)
Age: 80
End: 2025-08-04

## 2025-08-05 ENCOUNTER — OFFICE VISIT (OUTPATIENT)
Dept: PODIATRY CLINIC | Facility: CLINIC | Age: 80
End: 2025-08-05

## 2025-08-05 DIAGNOSIS — E11.9 TYPE 2 DIABETES MELLITUS WITHOUT COMPLICATION, WITHOUT LONG-TERM CURRENT USE OF INSULIN (HCC): Primary | ICD-10-CM

## 2025-08-05 DIAGNOSIS — B35.1 ONYCHOMYCOSIS: ICD-10-CM

## 2025-08-05 PROCEDURE — 99204 OFFICE O/P NEW MOD 45 MIN: CPT | Performed by: STUDENT IN AN ORGANIZED HEALTH CARE EDUCATION/TRAINING PROGRAM

## 2025-08-05 PROCEDURE — 1159F MED LIST DOCD IN RCRD: CPT | Performed by: STUDENT IN AN ORGANIZED HEALTH CARE EDUCATION/TRAINING PROGRAM

## 2025-08-05 RX ORDER — CLOTRIMAZOLE 1 G/ML
1 SOLUTION TOPICAL 2 TIMES DAILY
Qty: 60 ML | Refills: 5 | Status: SHIPPED | OUTPATIENT
Start: 2025-08-05

## 2025-08-05 RX ORDER — EPINEPHRINE 0.3 MG/.3ML
0.3 INJECTION SUBCUTANEOUS ONCE
COMMUNITY
Start: 2025-07-17

## 2025-08-17 ENCOUNTER — APPOINTMENT (OUTPATIENT)
Dept: GENERAL RADIOLOGY | Facility: HOSPITAL | Age: 80
End: 2025-08-17
Attending: EMERGENCY MEDICINE

## 2025-08-17 ENCOUNTER — HOSPITAL ENCOUNTER (OUTPATIENT)
Facility: HOSPITAL | Age: 80
Setting detail: OBSERVATION
Discharge: HOME | End: 2025-08-20
Attending: EMERGENCY MEDICINE | Admitting: INTERNAL MEDICINE

## 2025-08-17 DIAGNOSIS — R50.9 ACUTE FEBRILE ILLNESS: ICD-10-CM

## 2025-08-17 DIAGNOSIS — R73.9 HYPERGLYCEMIA: Primary | ICD-10-CM

## 2025-08-17 DIAGNOSIS — D72.829 LEUKOCYTOSIS, UNSPECIFIED TYPE: ICD-10-CM

## 2025-08-17 DIAGNOSIS — E87.1 HYPONATREMIA: ICD-10-CM

## 2025-08-17 LAB
ALBUMIN SERPL-MCNC: 3.8 G/DL (ref 3.2–4.8)
ALBUMIN/GLOB SERPL: 1.3 (ref 1–2)
ALP LIVER SERPL-CCNC: 294 U/L (ref 55–142)
ALT SERPL-CCNC: 186 U/L (ref 10–49)
ANION GAP SERPL CALC-SCNC: 9 MMOL/L (ref 0–18)
AST SERPL-CCNC: 95 U/L (ref ?–34)
BASOPHILS # BLD: 0 X10(3) UL (ref 0–0.2)
BASOPHILS NFR BLD: 0 %
BILIRUB SERPL-MCNC: 3.5 MG/DL (ref 0.2–1.1)
BILIRUB UR QL CFM: POSITIVE
BUN BLD-MCNC: 13 MG/DL (ref 9–23)
BUN/CREAT SERPL: 14.1 (ref 10–20)
CALCIUM BLD-MCNC: 9.8 MG/DL (ref 8.7–10.4)
CHLORIDE SERPL-SCNC: 97 MMOL/L (ref 98–112)
CO2 SERPL-SCNC: 23 MMOL/L (ref 21–32)
CREAT BLD-MCNC: 0.92 MG/DL (ref 0.55–1.02)
DEPRECATED RDW RBC AUTO: 53.4 FL (ref 35.1–46.3)
EGFRCR SERPLBLD CKD-EPI 2021: 63 ML/MIN/1.73M2 (ref 60–?)
EOSINOPHIL # BLD: 0.18 X10(3) UL (ref 0–0.7)
EOSINOPHIL NFR BLD: 2 %
ERYTHROCYTE [DISTWIDTH] IN BLOOD BY AUTOMATED COUNT: 14.6 % (ref 11–15)
FLUAV + FLUBV RNA SPEC NAA+PROBE: NEGATIVE
FLUAV + FLUBV RNA SPEC NAA+PROBE: NEGATIVE
GLOBULIN PLAS-MCNC: 2.9 G/DL (ref 2–3.5)
GLUCOSE BLD-MCNC: 310 MG/DL (ref 70–99)
GLUCOSE BLDC GLUCOMTR-MCNC: 294 MG/DL (ref 70–99)
GLUCOSE UR-MCNC: >1000 MG/DL
GRAN CASTS #/AREA URNS LPF: PRESENT /LPF
HCT VFR BLD AUTO: 36.7 % (ref 35–48)
HGB BLD-MCNC: 12.9 G/DL (ref 12–16)
HGB UR QL STRIP.AUTO: NEGATIVE
LACTATE SERPL-SCNC: 1.2 MMOL/L (ref 0.5–2)
LEUKOCYTE ESTERASE UR QL STRIP.AUTO: NEGATIVE
LYMPHOCYTES NFR BLD: 0.28 X10(3) UL (ref 1–4)
LYMPHOCYTES NFR BLD: 3 %
MCH RBC QN AUTO: 35.1 PG (ref 26–34)
MCHC RBC AUTO-ENTMCNC: 35.1 G/DL (ref 31–37)
MCV RBC AUTO: 99.7 FL (ref 80–100)
METAMYELOCYTES # BLD: 0.09 X10(3) UL (ref ?–0.01)
METAMYELOCYTES NFR BLD: 1 %
MONOCYTES # BLD: 1.1 X10(3) UL (ref 0.1–1)
MONOCYTES NFR BLD: 12 %
MORPHOLOGY: NORMAL
NEUTROPHILS # BLD AUTO: 6.58 X10 (3) UL (ref 1.5–7.7)
NEUTROPHILS NFR BLD: 76 %
NEUTS BAND NFR BLD: 6 %
NEUTS HYPERSEG # BLD: 7.54 X10(3) UL (ref 1.5–7.7)
NITRITE UR QL STRIP.AUTO: NEGATIVE
OSMOLALITY SERPL CALC.SUM OF ELEC: 280 MOSM/KG (ref 275–295)
PH UR: 6 (ref 5–8)
PLATELET # BLD AUTO: 422 10(3)UL (ref 150–450)
POTASSIUM SERPL-SCNC: 3.7 MMOL/L (ref 3.5–5.1)
PROT SERPL-MCNC: 6.7 G/DL (ref 5.7–8.2)
PROT UR-MCNC: 50 MG/DL
RBC # BLD AUTO: 3.68 X10(6)UL (ref 3.8–5.3)
RSV RNA SPEC NAA+PROBE: NEGATIVE
SARS-COV-2 RNA RESP QL NAA+PROBE: NOT DETECTED
SODIUM SERPL-SCNC: 129 MMOL/L (ref 136–145)
SP GR UR STRIP: 1.02 (ref 1–1.03)
TOTAL CELLS COUNTED BLD: 100
UROBILINOGEN UR STRIP-ACNC: 4
WBC # BLD AUTO: 9.2 X10(3) UL (ref 4–11)

## 2025-08-17 PROCEDURE — 71045 X-RAY EXAM CHEST 1 VIEW: CPT | Performed by: EMERGENCY MEDICINE

## 2025-08-17 RX ORDER — ACETAMINOPHEN 500 MG
1000 TABLET ORAL ONCE
Status: COMPLETED | OUTPATIENT
Start: 2025-08-17 | End: 2025-08-17

## 2025-08-18 ENCOUNTER — APPOINTMENT (OUTPATIENT)
Dept: CT IMAGING | Facility: HOSPITAL | Age: 80
End: 2025-08-18
Attending: STUDENT IN AN ORGANIZED HEALTH CARE EDUCATION/TRAINING PROGRAM

## 2025-08-18 PROBLEM — R50.9 ACUTE FEBRILE ILLNESS: Status: ACTIVE | Noted: 2025-08-18

## 2025-08-18 PROBLEM — R73.9 HYPERGLYCEMIA: Status: ACTIVE | Noted: 2025-08-18

## 2025-08-18 PROBLEM — E87.1 HYPONATREMIA: Status: ACTIVE | Noted: 2025-08-18

## 2025-08-18 LAB
ALBUMIN SERPL-MCNC: 3.3 G/DL (ref 3.2–4.8)
ALBUMIN/GLOB SERPL: 1.1 (ref 1–2)
ALP LIVER SERPL-CCNC: 251 U/L (ref 55–142)
ALT SERPL-CCNC: 154 U/L (ref 10–49)
ANION GAP SERPL CALC-SCNC: 7 MMOL/L (ref 0–18)
AST SERPL-CCNC: 76 U/L (ref ?–34)
BILIRUB SERPL-MCNC: 2.8 MG/DL (ref 0.2–1.1)
BUN BLD-MCNC: 9 MG/DL (ref 9–23)
BUN/CREAT SERPL: 11.3 (ref 10–20)
CALCIUM BLD-MCNC: 8.9 MG/DL (ref 8.7–10.4)
CHLORIDE SERPL-SCNC: 106 MMOL/L (ref 98–112)
CO2 SERPL-SCNC: 19 MMOL/L (ref 21–32)
CREAT BLD-MCNC: 0.8 MG/DL (ref 0.55–1.02)
DEPRECATED RDW RBC AUTO: 61.6 FL (ref 35.1–46.3)
EGFRCR SERPLBLD CKD-EPI 2021: 75 ML/MIN/1.73M2 (ref 60–?)
ERYTHROCYTE [DISTWIDTH] IN BLOOD BY AUTOMATED COUNT: 15.3 % (ref 11–15)
GLOBULIN PLAS-MCNC: 2.9 G/DL (ref 2–3.5)
GLUCOSE BLD-MCNC: 330 MG/DL (ref 70–99)
GLUCOSE BLDC GLUCOMTR-MCNC: 236 MG/DL (ref 70–99)
GLUCOSE BLDC GLUCOMTR-MCNC: 245 MG/DL (ref 70–99)
GLUCOSE BLDC GLUCOMTR-MCNC: 305 MG/DL (ref 70–99)
GLUCOSE BLDC GLUCOMTR-MCNC: 319 MG/DL (ref 70–99)
GLUCOSE BLDC GLUCOMTR-MCNC: 380 MG/DL (ref 70–99)
HCT VFR BLD AUTO: 36.8 % (ref 35–48)
HGB BLD-MCNC: 12.2 G/DL (ref 12–16)
MAGNESIUM SERPL-MCNC: 1.7 MG/DL (ref 1.6–2.6)
MCH RBC QN AUTO: 36 PG (ref 26–34)
MCHC RBC AUTO-ENTMCNC: 33.2 G/DL (ref 31–37)
MCV RBC AUTO: 108.6 FL (ref 80–100)
OSMOLALITY SERPL CALC.SUM OF ELEC: 286 MOSM/KG (ref 275–295)
OSMOLALITY UR: 345 MOSM/KG (ref 300–1300)
PHOSPHATE SERPL-MCNC: 3.2 MG/DL (ref 2.4–5.1)
PLATELET # BLD AUTO: 327 10(3)UL (ref 150–450)
PLATELETS.RETICULATED NFR BLD AUTO: 5 % (ref 0–7)
POTASSIUM SERPL-SCNC: 4.1 MMOL/L (ref 3.5–5.1)
PROT SERPL-MCNC: 6.2 G/DL (ref 5.7–8.2)
RBC # BLD AUTO: 3.39 X10(6)UL (ref 3.8–5.3)
SODIUM SERPL-SCNC: 132 MMOL/L (ref 136–145)
SODIUM SERPL-SCNC: 21 MMOL/L
WBC # BLD AUTO: 7.8 X10(3) UL (ref 4–11)

## 2025-08-18 PROCEDURE — 74176 CT ABD & PELVIS W/O CONTRAST: CPT | Performed by: STUDENT IN AN ORGANIZED HEALTH CARE EDUCATION/TRAINING PROGRAM

## 2025-08-18 RX ORDER — ASPIRIN 81 MG/1
81 TABLET, CHEWABLE ORAL DAILY
Status: DISCONTINUED | OUTPATIENT
Start: 2025-08-18 | End: 2025-08-20

## 2025-08-18 RX ORDER — HEPARIN SODIUM 5000 [USP'U]/ML
5000 INJECTION, SOLUTION INTRAVENOUS; SUBCUTANEOUS EVERY 8 HOURS SCHEDULED
Status: DISCONTINUED | OUTPATIENT
Start: 2025-08-18 | End: 2025-08-19

## 2025-08-18 RX ORDER — PANTOPRAZOLE SODIUM 40 MG/1
40 TABLET, DELAYED RELEASE ORAL DAILY
Status: DISCONTINUED | OUTPATIENT
Start: 2025-08-18 | End: 2025-08-20

## 2025-08-18 RX ORDER — MONTELUKAST SODIUM 10 MG/1
10 TABLET ORAL NIGHTLY
Status: DISCONTINUED | OUTPATIENT
Start: 2025-08-18 | End: 2025-08-20

## 2025-08-18 RX ORDER — AMLODIPINE BESYLATE 10 MG/1
10 TABLET ORAL DAILY
Status: DISCONTINUED | OUTPATIENT
Start: 2025-08-18 | End: 2025-08-20

## 2025-08-18 RX ORDER — HYDROXYZINE HYDROCHLORIDE 25 MG/1
25 TABLET, FILM COATED ORAL EVERY 12 HOURS PRN
Status: DISCONTINUED | OUTPATIENT
Start: 2025-08-18 | End: 2025-08-20

## 2025-08-18 RX ORDER — HYDROXYUREA 500 MG/1
500 CAPSULE ORAL NIGHTLY
Status: DISCONTINUED | OUTPATIENT
Start: 2025-08-18 | End: 2025-08-20

## 2025-08-18 RX ORDER — ROSUVASTATIN CALCIUM 5 MG/1
5 TABLET, COATED ORAL NIGHTLY
Status: DISCONTINUED | OUTPATIENT
Start: 2025-08-18 | End: 2025-08-20

## 2025-08-18 RX ORDER — METOPROLOL SUCCINATE 100 MG/1
100 TABLET, EXTENDED RELEASE ORAL DAILY
Status: DISCONTINUED | OUTPATIENT
Start: 2025-08-18 | End: 2025-08-19

## 2025-08-18 RX ORDER — HYDROXYUREA 500 MG/1
1000 CAPSULE ORAL DAILY
Status: DISCONTINUED | OUTPATIENT
Start: 2025-08-18 | End: 2025-08-20

## 2025-08-18 RX ORDER — MAGNESIUM OXIDE 400 MG/1
400 TABLET ORAL ONCE
Status: COMPLETED | OUTPATIENT
Start: 2025-08-18 | End: 2025-08-18

## 2025-08-18 RX ORDER — FOLIC ACID 0.4 MG
400 TABLET ORAL DAILY
Status: DISCONTINUED | OUTPATIENT
Start: 2025-08-18 | End: 2025-08-20

## 2025-08-18 RX ORDER — HYDRALAZINE HYDROCHLORIDE 25 MG/1
25 TABLET, FILM COATED ORAL 2 TIMES DAILY
Status: DISCONTINUED | OUTPATIENT
Start: 2025-08-18 | End: 2025-08-20

## 2025-08-19 ENCOUNTER — APPOINTMENT (OUTPATIENT)
Dept: CV DIAGNOSTICS | Facility: HOSPITAL | Age: 80
End: 2025-08-19
Attending: STUDENT IN AN ORGANIZED HEALTH CARE EDUCATION/TRAINING PROGRAM

## 2025-08-19 LAB
ALBUMIN SERPL-MCNC: 3.2 G/DL (ref 3.2–4.8)
ALBUMIN/GLOB SERPL: 1.1 (ref 1–2)
ALP LIVER SERPL-CCNC: 261 U/L (ref 55–142)
ALT SERPL-CCNC: 137 U/L (ref 10–49)
ANION GAP SERPL CALC-SCNC: 12 MMOL/L (ref 0–18)
AST SERPL-CCNC: 69 U/L (ref ?–34)
BILIRUB SERPL-MCNC: 2.6 MG/DL (ref 0.2–1.1)
BUN BLD-MCNC: 11 MG/DL (ref 9–23)
BUN/CREAT SERPL: 13.9 (ref 10–20)
CALCIUM BLD-MCNC: 8.9 MG/DL (ref 8.7–10.4)
CHLORIDE SERPL-SCNC: 102 MMOL/L (ref 98–112)
CO2 SERPL-SCNC: 21 MMOL/L (ref 21–32)
CREAT BLD-MCNC: 0.79 MG/DL (ref 0.55–1.02)
DEPRECATED RDW RBC AUTO: 54.3 FL (ref 35.1–46.3)
EGFRCR SERPLBLD CKD-EPI 2021: 76 ML/MIN/1.73M2 (ref 60–?)
ERYTHROCYTE [DISTWIDTH] IN BLOOD BY AUTOMATED COUNT: 14.8 % (ref 11–15)
GLOBULIN PLAS-MCNC: 2.9 G/DL (ref 2–3.5)
GLUCOSE BLD-MCNC: 242 MG/DL (ref 70–99)
GLUCOSE BLDC GLUCOMTR-MCNC: 259 MG/DL (ref 70–99)
GLUCOSE BLDC GLUCOMTR-MCNC: 268 MG/DL (ref 70–99)
GLUCOSE BLDC GLUCOMTR-MCNC: 310 MG/DL (ref 70–99)
GLUCOSE BLDC GLUCOMTR-MCNC: 357 MG/DL (ref 70–99)
HCT VFR BLD AUTO: 36.5 % (ref 35–48)
HGB BLD-MCNC: 12.6 G/DL (ref 12–16)
MAGNESIUM SERPL-MCNC: 1.7 MG/DL (ref 1.6–2.6)
MCH RBC QN AUTO: 34.6 PG (ref 26–34)
MCHC RBC AUTO-ENTMCNC: 34.5 G/DL (ref 31–37)
MCV RBC AUTO: 100.3 FL (ref 80–100)
OSMOLALITY SERPL CALC.SUM OF ELEC: 287 MOSM/KG (ref 275–295)
PHOSPHATE SERPL-MCNC: 2.8 MG/DL (ref 2.4–5.1)
PLATELET # BLD AUTO: 475 10(3)UL (ref 150–450)
POTASSIUM SERPL-SCNC: 3.3 MMOL/L (ref 3.5–5.1)
PROT SERPL-MCNC: 6.1 G/DL (ref 5.7–8.2)
Q-T INTERVAL: 284 MS
QRS DURATION: 82 MS
QTC CALCULATION (BEZET): 456 MS
R AXIS: -16 DEGREES
RBC # BLD AUTO: 3.64 X10(6)UL (ref 3.8–5.3)
SODIUM SERPL-SCNC: 135 MMOL/L (ref 136–145)
T AXIS: 20 DEGREES
VENTRICULAR RATE: 155 BPM
WBC # BLD AUTO: 11.1 X10(3) UL (ref 4–11)

## 2025-08-19 PROCEDURE — 99497 ADVNCD CARE PLAN 30 MIN: CPT | Performed by: INTERNAL MEDICINE

## 2025-08-19 PROCEDURE — 93306 TTE W/DOPPLER COMPLETE: CPT | Performed by: STUDENT IN AN ORGANIZED HEALTH CARE EDUCATION/TRAINING PROGRAM

## 2025-08-19 PROCEDURE — 99233 SBSQ HOSP IP/OBS HIGH 50: CPT | Performed by: INTERNAL MEDICINE

## 2025-08-19 RX ORDER — POTASSIUM CHLORIDE 1500 MG/1
40 TABLET, EXTENDED RELEASE ORAL ONCE
Status: COMPLETED | OUTPATIENT
Start: 2025-08-19 | End: 2025-08-19

## 2025-08-19 RX ORDER — METOPROLOL TARTRATE 1 MG/ML
5 INJECTION, SOLUTION INTRAVENOUS EVERY 6 HOURS PRN
Status: DISCONTINUED | OUTPATIENT
Start: 2025-08-19 | End: 2025-08-20

## 2025-08-19 RX ORDER — METOPROLOL TARTRATE 1 MG/ML
5 INJECTION, SOLUTION INTRAVENOUS ONCE
Status: COMPLETED | OUTPATIENT
Start: 2025-08-19 | End: 2025-08-19

## 2025-08-19 RX ORDER — MAGNESIUM OXIDE 400 MG/1
400 TABLET ORAL ONCE
Status: COMPLETED | OUTPATIENT
Start: 2025-08-19 | End: 2025-08-19

## 2025-08-19 RX ORDER — ACETAMINOPHEN 325 MG/1
650 TABLET ORAL EVERY 6 HOURS PRN
Status: DISCONTINUED | OUTPATIENT
Start: 2025-08-19 | End: 2025-08-20

## 2025-08-19 RX ORDER — METOPROLOL SUCCINATE 100 MG/1
200 TABLET, EXTENDED RELEASE ORAL DAILY
Status: DISCONTINUED | OUTPATIENT
Start: 2025-08-20 | End: 2025-08-20

## 2025-08-20 VITALS
HEIGHT: 62 IN | TEMPERATURE: 98 F | HEART RATE: 102 BPM | BODY MASS INDEX: 35.48 KG/M2 | DIASTOLIC BLOOD PRESSURE: 72 MMHG | WEIGHT: 192.81 LBS | OXYGEN SATURATION: 97 % | SYSTOLIC BLOOD PRESSURE: 140 MMHG | RESPIRATION RATE: 18 BRPM

## 2025-08-20 LAB
ALBUMIN SERPL-MCNC: 3.1 G/DL (ref 3.2–4.8)
ALBUMIN/GLOB SERPL: 1 (ref 1–2)
ALP LIVER SERPL-CCNC: 258 U/L (ref 55–142)
ALT SERPL-CCNC: 120 U/L (ref 10–49)
ANION GAP SERPL CALC-SCNC: 8 MMOL/L (ref 0–18)
AST SERPL-CCNC: 55 U/L (ref ?–34)
BASOPHILS # BLD AUTO: 0.05 X10(3) UL (ref 0–0.2)
BASOPHILS NFR BLD AUTO: 0.4 %
BILIRUB SERPL-MCNC: 1.9 MG/DL (ref 0.2–1.1)
BUN BLD-MCNC: 11 MG/DL (ref 9–23)
BUN/CREAT SERPL: 15.5 (ref 10–20)
CALCIUM BLD-MCNC: 9.4 MG/DL (ref 8.7–10.4)
CHLORIDE SERPL-SCNC: 104 MMOL/L (ref 98–112)
CO2 SERPL-SCNC: 23 MMOL/L (ref 21–32)
CREAT BLD-MCNC: 0.71 MG/DL (ref 0.55–1.02)
DEPRECATED RDW RBC AUTO: 57.9 FL (ref 35.1–46.3)
EGFRCR SERPLBLD CKD-EPI 2021: 86 ML/MIN/1.73M2 (ref 60–?)
EOSINOPHIL # BLD AUTO: 0.7 X10(3) UL (ref 0–0.7)
EOSINOPHIL NFR BLD AUTO: 6 %
ERYTHROCYTE [DISTWIDTH] IN BLOOD BY AUTOMATED COUNT: 15 % (ref 11–15)
GLOBULIN PLAS-MCNC: 3 G/DL (ref 2–3.5)
GLUCOSE BLD-MCNC: 220 MG/DL (ref 70–99)
GLUCOSE BLDC GLUCOMTR-MCNC: 222 MG/DL (ref 70–99)
GLUCOSE BLDC GLUCOMTR-MCNC: 232 MG/DL (ref 70–99)
GLUCOSE BLDC GLUCOMTR-MCNC: 297 MG/DL (ref 70–99)
HCT VFR BLD AUTO: 34.2 % (ref 35–48)
HGB BLD-MCNC: 11.5 G/DL (ref 12–16)
IMM GRANULOCYTES # BLD AUTO: 0.46 X10(3) UL (ref 0–1)
IMM GRANULOCYTES NFR BLD: 4 %
LYMPHOCYTES # BLD AUTO: 0.73 X10(3) UL (ref 1–4)
LYMPHOCYTES NFR BLD AUTO: 6.3 %
MAGNESIUM SERPL-MCNC: 1.8 MG/DL (ref 1.6–2.6)
MCH RBC QN AUTO: 35.2 PG (ref 26–34)
MCHC RBC AUTO-ENTMCNC: 33.6 G/DL (ref 31–37)
MCV RBC AUTO: 104.6 FL (ref 80–100)
MONOCYTES # BLD AUTO: 0.94 X10(3) UL (ref 0.1–1)
MONOCYTES NFR BLD AUTO: 8.1 %
NEUTROPHILS # BLD AUTO: 8.73 X10 (3) UL (ref 1.5–7.7)
NEUTROPHILS # BLD AUTO: 8.73 X10(3) UL (ref 1.5–7.7)
NEUTROPHILS NFR BLD AUTO: 75.2 %
OSMOLALITY SERPL CALC.SUM OF ELEC: 286 MOSM/KG (ref 275–295)
PHOSPHATE SERPL-MCNC: 2.8 MG/DL (ref 2.4–5.1)
PLATELET # BLD AUTO: 496 10(3)UL (ref 150–450)
POTASSIUM SERPL-SCNC: 3.7 MMOL/L (ref 3.5–5.1)
POTASSIUM SERPL-SCNC: 3.7 MMOL/L (ref 3.5–5.1)
PROT SERPL-MCNC: 6.1 G/DL (ref 5.7–8.2)
RBC # BLD AUTO: 3.27 X10(6)UL (ref 3.8–5.3)
SODIUM SERPL-SCNC: 135 MMOL/L (ref 136–145)
TSI SER-ACNC: 1.37 UIU/ML (ref 0.55–4.78)
WBC # BLD AUTO: 11.6 X10(3) UL (ref 4–11)

## 2025-08-20 PROCEDURE — 99239 HOSP IP/OBS DSCHRG MGMT >30: CPT | Performed by: INTERNAL MEDICINE

## 2025-08-20 RX ORDER — POTASSIUM CHLORIDE 1500 MG/1
40 TABLET, EXTENDED RELEASE ORAL ONCE
Status: COMPLETED | OUTPATIENT
Start: 2025-08-20 | End: 2025-08-20

## 2025-08-20 RX ORDER — MAGNESIUM OXIDE 400 MG/1
400 TABLET ORAL ONCE
Status: COMPLETED | OUTPATIENT
Start: 2025-08-20 | End: 2025-08-20

## 2025-08-20 RX ORDER — NITROFURANTOIN 25; 75 MG/1; MG/1
100 CAPSULE ORAL 2 TIMES DAILY
Qty: 6 CAPSULE | Refills: 0 | Status: SHIPPED | OUTPATIENT
Start: 2025-08-20 | End: 2025-08-23

## 2025-08-20 RX ORDER — METOPROLOL SUCCINATE 200 MG/1
200 TABLET, EXTENDED RELEASE ORAL DAILY
Qty: 30 TABLET | Refills: 0 | Status: SHIPPED | OUTPATIENT
Start: 2025-08-21

## 2025-08-22 ENCOUNTER — LAB ENCOUNTER (OUTPATIENT)
Dept: LAB | Facility: HOSPITAL | Age: 80
End: 2025-08-22
Attending: INTERNAL MEDICINE

## 2025-08-22 DIAGNOSIS — D72.829 LEUKOCYTOSIS, UNSPECIFIED TYPE: ICD-10-CM

## 2025-08-22 LAB
BASOPHILS # BLD AUTO: 0.13 X10(3) UL (ref 0–0.2)
BASOPHILS NFR BLD AUTO: 1.6 %
DEPRECATED RDW RBC AUTO: 59.4 FL (ref 35.1–46.3)
EOSINOPHIL # BLD AUTO: 0.49 X10(3) UL (ref 0–0.7)
EOSINOPHIL NFR BLD AUTO: 6.1 %
ERYTHROCYTE [DISTWIDTH] IN BLOOD BY AUTOMATED COUNT: 15.6 % (ref 11–15)
HCT VFR BLD AUTO: 35.2 % (ref 35–48)
HGB BLD-MCNC: 12 G/DL (ref 12–16)
IMM GRANULOCYTES # BLD AUTO: 0.19 X10(3) UL (ref 0–1)
IMM GRANULOCYTES NFR BLD: 2.4 %
LYMPHOCYTES # BLD AUTO: 0.95 X10(3) UL (ref 1–4)
LYMPHOCYTES NFR BLD AUTO: 11.8 %
MCH RBC QN AUTO: 35.9 PG (ref 26–34)
MCHC RBC AUTO-ENTMCNC: 34.1 G/DL (ref 31–37)
MCV RBC AUTO: 105.4 FL (ref 80–100)
MONOCYTES # BLD AUTO: 1.01 X10(3) UL (ref 0.1–1)
MONOCYTES NFR BLD AUTO: 12.5 %
NEUTROPHILS # BLD AUTO: 5.28 X10 (3) UL (ref 1.5–7.7)
NEUTROPHILS # BLD AUTO: 5.28 X10(3) UL (ref 1.5–7.7)
NEUTROPHILS NFR BLD AUTO: 65.6 %
PLATELET # BLD AUTO: 606 10(3)UL (ref 150–450)
RBC # BLD AUTO: 3.34 X10(6)UL (ref 3.8–5.3)
WBC # BLD AUTO: 8.1 X10(3) UL (ref 4–11)

## 2025-08-22 PROCEDURE — 85025 COMPLETE CBC W/AUTO DIFF WBC: CPT

## 2025-08-22 PROCEDURE — 36415 COLL VENOUS BLD VENIPUNCTURE: CPT

## 2025-08-25 ENCOUNTER — OFFICE VISIT (OUTPATIENT)
Dept: INTERNAL MEDICINE CLINIC | Facility: CLINIC | Age: 80
End: 2025-08-25

## 2025-08-25 VITALS
DIASTOLIC BLOOD PRESSURE: 82 MMHG | SYSTOLIC BLOOD PRESSURE: 142 MMHG | HEIGHT: 62 IN | HEART RATE: 86 BPM | BODY MASS INDEX: 34.04 KG/M2 | OXYGEN SATURATION: 95 % | WEIGHT: 185 LBS | TEMPERATURE: 97 F

## 2025-08-25 DIAGNOSIS — E11.9 TYPE 2 DIABETES MELLITUS WITHOUT COMPLICATION, WITHOUT LONG-TERM CURRENT USE OF INSULIN (HCC): ICD-10-CM

## 2025-08-25 DIAGNOSIS — I48.91 ATRIAL FIBRILLATION, UNSPECIFIED TYPE (HCC): Primary | ICD-10-CM

## 2025-08-25 DIAGNOSIS — I10 HYPERTENSION, UNSPECIFIED TYPE: ICD-10-CM

## 2025-08-25 PROBLEM — R73.9 HYPERGLYCEMIA: Status: RESOLVED | Noted: 2025-08-18 | Resolved: 2025-08-25

## 2025-08-25 PROBLEM — R50.9 ACUTE FEBRILE ILLNESS: Status: RESOLVED | Noted: 2025-08-18 | Resolved: 2025-08-25

## 2025-08-25 PROBLEM — E87.1 HYPONATREMIA: Status: RESOLVED | Noted: 2025-08-18 | Resolved: 2025-08-25

## 2025-08-25 PROCEDURE — 1111F DSCHRG MED/CURRENT MED MERGE: CPT | Performed by: INTERNAL MEDICINE

## 2025-08-25 PROCEDURE — 3077F SYST BP >= 140 MM HG: CPT | Performed by: INTERNAL MEDICINE

## 2025-08-25 PROCEDURE — G2211 COMPLEX E/M VISIT ADD ON: HCPCS | Performed by: INTERNAL MEDICINE

## 2025-08-25 PROCEDURE — 1159F MED LIST DOCD IN RCRD: CPT | Performed by: INTERNAL MEDICINE

## 2025-08-25 PROCEDURE — 99214 OFFICE O/P EST MOD 30 MIN: CPT | Performed by: INTERNAL MEDICINE

## 2025-08-25 PROCEDURE — 3079F DIAST BP 80-89 MM HG: CPT | Performed by: INTERNAL MEDICINE

## 2025-08-25 PROCEDURE — 1126F AMNT PAIN NOTED NONE PRSNT: CPT | Performed by: INTERNAL MEDICINE

## 2025-08-25 PROCEDURE — 3008F BODY MASS INDEX DOCD: CPT | Performed by: INTERNAL MEDICINE

## 2025-08-25 RX ORDER — SPIRONOLACTONE 25 MG/1
25 TABLET ORAL DAILY
Qty: 30 TABLET | Refills: 0 | Status: SHIPPED | OUTPATIENT
Start: 2025-08-25 | End: 2025-09-24

## 2025-08-26 ENCOUNTER — PATIENT MESSAGE (OUTPATIENT)
Dept: ENDOCRINOLOGY CLINIC | Facility: CLINIC | Age: 80
End: 2025-08-26

## 2025-08-29 ENCOUNTER — LAB ENCOUNTER (OUTPATIENT)
Dept: LAB | Facility: HOSPITAL | Age: 80
End: 2025-08-29
Attending: INTERNAL MEDICINE

## 2025-08-29 ENCOUNTER — OFFICE VISIT (OUTPATIENT)
Dept: ENDOCRINOLOGY CLINIC | Facility: CLINIC | Age: 80
End: 2025-08-29

## 2025-08-29 ENCOUNTER — NURSE ONLY (OUTPATIENT)
Dept: ENDOCRINOLOGY CLINIC | Facility: CLINIC | Age: 80
End: 2025-08-29

## 2025-08-29 VITALS
HEART RATE: 76 BPM | BODY MASS INDEX: 33.68 KG/M2 | SYSTOLIC BLOOD PRESSURE: 134 MMHG | WEIGHT: 183 LBS | DIASTOLIC BLOOD PRESSURE: 76 MMHG | HEIGHT: 62 IN | RESPIRATION RATE: 18 BRPM

## 2025-08-29 DIAGNOSIS — E78.5 DYSLIPIDEMIA: Primary | ICD-10-CM

## 2025-08-29 DIAGNOSIS — E11.65 TYPE 2 DIABETES MELLITUS WITH HYPERGLYCEMIA, WITHOUT LONG-TERM CURRENT USE OF INSULIN (HCC): Primary | ICD-10-CM

## 2025-08-29 DIAGNOSIS — E11.65 TYPE 2 DIABETES MELLITUS WITH HYPERGLYCEMIA, WITHOUT LONG-TERM CURRENT USE OF INSULIN (HCC): ICD-10-CM

## 2025-08-29 LAB
ANION GAP SERPL CALC-SCNC: 7 MMOL/L (ref 0–18)
ANION GAP SERPL CALC-SCNC: 8 MMOL/L (ref 0–18)
BUN BLD-MCNC: 11 MG/DL (ref 9–23)
BUN BLD-MCNC: 12 MG/DL (ref 9–23)
BUN/CREAT SERPL: 12 (ref 10–20)
BUN/CREAT SERPL: 12.6 (ref 10–20)
CALCIUM BLD-MCNC: 9.4 MG/DL (ref 8.7–10.4)
CALCIUM BLD-MCNC: 9.5 MG/DL (ref 8.7–10.4)
CHLORIDE SERPL-SCNC: 99 MMOL/L (ref 98–112)
CHLORIDE SERPL-SCNC: 99 MMOL/L (ref 98–112)
CHOLEST SERPL-MCNC: 155 MG/DL (ref ?–200)
CO2 SERPL-SCNC: 26 MMOL/L (ref 21–32)
CO2 SERPL-SCNC: 28 MMOL/L (ref 21–32)
CREAT BLD-MCNC: 0.92 MG/DL (ref 0.55–1.02)
CREAT BLD-MCNC: 0.95 MG/DL (ref 0.55–1.02)
EGFRCR SERPLBLD CKD-EPI 2021: 61 ML/MIN/1.73M2 (ref 60–?)
EGFRCR SERPLBLD CKD-EPI 2021: 63 ML/MIN/1.73M2 (ref 60–?)
FASTING PATIENT LIPID ANSWER: NO
FASTING STATUS PATIENT QL REPORTED: NO
FASTING STATUS PATIENT QL REPORTED: NO
GLUCOSE BLD-MCNC: 380 MG/DL (ref 70–99)
GLUCOSE BLD-MCNC: 383 MG/DL (ref 70–99)
HDLC SERPL-MCNC: 32 MG/DL (ref 40–59)
LDLC SERPL CALC-MCNC: 81 MG/DL (ref ?–100)
NONHDLC SERPL-MCNC: 123 MG/DL (ref ?–130)
OSMOLALITY SERPL CALC.SUM OF ELEC: 291 MOSM/KG (ref 275–295)
OSMOLALITY SERPL CALC.SUM OF ELEC: 293 MOSM/KG (ref 275–295)
POTASSIUM SERPL-SCNC: 4.5 MMOL/L (ref 3.5–5.1)
POTASSIUM SERPL-SCNC: 5.6 MMOL/L (ref 3.5–5.1)
SODIUM SERPL-SCNC: 133 MMOL/L (ref 136–145)
SODIUM SERPL-SCNC: 134 MMOL/L (ref 136–145)
TEST STRIP LOT #: NORMAL NUMERIC
TRIGL SERPL-MCNC: 250 MG/DL (ref 30–149)
VLDLC SERPL CALC-MCNC: 40 MG/DL (ref 0–30)

## 2025-08-29 PROCEDURE — 80061 LIPID PANEL: CPT | Performed by: INTERNAL MEDICINE

## 2025-08-29 PROCEDURE — 80048 BASIC METABOLIC PNL TOTAL CA: CPT | Performed by: INTERNAL MEDICINE

## 2025-08-29 PROCEDURE — 1111F DSCHRG MED/CURRENT MED MERGE: CPT | Performed by: INTERNAL MEDICINE

## 2025-08-29 PROCEDURE — 36415 COLL VENOUS BLD VENIPUNCTURE: CPT | Performed by: INTERNAL MEDICINE

## 2025-08-29 RX ORDER — GLIPIZIDE 5 MG/1
5 TABLET ORAL
Qty: 180 TABLET | Refills: 0 | Status: SHIPPED | OUTPATIENT
Start: 2025-08-29

## 2025-08-29 RX ORDER — BLOOD SUGAR DIAGNOSTIC
STRIP MISCELLANEOUS
Qty: 100 EACH | Refills: 1 | Status: SHIPPED | OUTPATIENT
Start: 2025-08-29

## (undated) NOTE — LETTER
2/3/2023          To Whom It May Concern:    Basim Meier is currently under my medical care. There is no medical contraindication for patient to undergo dental cleaning. If we have to hold Plavix, we will need to obtain permission from cardiology. If you require additional information please contact our office.         Sincerely,    Ezra Harris MD          Document generated by:  Ezra Harris MD

## (undated) NOTE — LETTER
[address] [facility]    Consent for Diagnostic Services    Your physician has ordered one of the following tests to determine the function of your heart: Stess Echo Treadmill Test with contrast. The information obtained will aid your physician in detecting the possible presence of heart disease, to determine why you may have such symptoms such as chest pain or shortness of breath and to determine an appropriate plan of medical management. The risks associated with any exercise test or pharmacological stress test are similar to the risks associated with exercise, including an abnormal blood pressure, dizziness, fainting, abnormal heart rate and in very rear instances heart attach, stroke, or death. During the test you must report any unusual feeling or symptoms you experience during the test. Every effort will be made to minimize such risks by careful observation during the test. Emergency equipment and trained personnel are available to deal with unusual situations, which may arise and these personnel have permission to treat you. During the treadmill or nuclear stress test, the exercise intensity begins at a low level and advances in stages depending on your fitness level. We may stop the test at any time because of signs of fatigue or changes in your heart rate, electrocardiogram, or blood pressure, or other symptoms you may experience. If your doctor has ordered a pharmacological stress test, you may experience a headache, shortness of breath, flushing, warmth, rapid heart rate, or a bitter taste in your mouth. Sometimes you may not experience any symptoms. Your prompt reporting of any symptoms is very important. During a Regadenoson stress test, you are given an injection of Regadenoson. Immediately after the Regadenoson is given, you will be injected with a radioactive isotope. During a Dobutamine stress test, Dobutamine infuses over approximately fifteen minutes.  A radioactive isotope is injected during the infusion. After either pharmacological stress test, you will have either a nuclear scan or an echocardiogram.    The information that is obtained during your testing will be treated as privileged and confidential. The information obtained will be given to your doctor and may be used for insurance purposes or to track and trend data as part of  with your privacy retained. I have read and understand the above information. I voluntarily agree and consent to the above ordered test. Furthermore, no guarantees or assurances have been given by anyone as to the results that may be obtained from this procedure. Any questions that may have occurred to me have been answered to my satisfaction.      Signature of Patient:   ____________________________________________________________    Signature of Witness: _______________________________Date: ___________Time: ________

## (undated) NOTE — LETTER
[address] [place] [address]  Authorization for Invasive Procedures  Date: ***           Time: ***    {[facility] ivs consent:[id number]}

## (undated) NOTE — LETTER
[address] [facility] [place]  Authorization for Invasive Procedures  Date: 12/28/22           Time: 1210    1. I hereby authorize Josselyn Looney, my physician and his/her assistants (if applicable), which may include medical students, residents, and/or fellows, to perform the following surgical operation/ procedure and administer such anesthesia as may be determined necessary by my physician:  Operation/Procedure name (s)  Cardiac Catheterization, Left Ventricular Cineangiography, Bilateral Selective Coronary Angiography and/or Right Heart Catheterization; possible Percutaneous Transluminal Coronary Angioplasty, Coronary Atherectomy, Coronary Stent, Intracoronary Thrombolytic therapy, Antiplatelet therapy and/or Intravascular Ultrasound on [facility]   2. I recognize that during the surgical operation/procedure, unforeseen conditions may necessitate additional or different procedures than those listed above. I, therefore, further authorize and request that the above-named surgeon, assistants, or designees perform such procedures as are, in their judgment, necessary and desirable. 3.   My surgeon/physician has discussed prior to my surgery the potential benefits, risks and side effects of this procedure; the likelihood of achieving goals; and potential problems that might occur during recuperation. They also discussed reasonable alternatives to the procedure, including risks, benefits, and side effects related to the alternatives and risks related to not receiving this procedure. I have had all my questions answered and I acknowledge that no guarantee has been made as to the result that may be obtained. 4.   Should the need arise during my operation/procedure, which includes change of level of care prior to discharge, I also consent to the administration of blood and/or blood products.   Further, I understand that despite careful testing and screening of blood or blood products by collecting agencies, I may still be subject to ill effects as a result of receiving a blood transfusion and/or blood products. The following are some, but not all, of the potential risks that can occur: fever and allergic reactions, hemolytic reactions, transmission of diseases such as Hepatitis, AIDS and Cytomegalovirus (CMV) and fluid overload. In the event that I wish to have an autologous transfusion of my own blood, or a directed donor transfusion, I will discuss this with my physician. Check only if Refusing Blood or Blood Products  I understand refusal of blood or blood products as deemed necessary by my physician may have serious consequences to my condition to include possible death. I hereby assume responsibility for my refusal and release the hospital, its personnel, and my physicians from any responsibility for the consequences of my refusal.          o  Refuse      5. I authorize the use of any specimen, organs, tissues, body parts or foreign objects that may be removed from my body during the operation/procedure for diagnosis, research or teaching purposes and their subsequent disposal by hospital authorities. I also authorize the release of specimen test results and/or written reports to my treating physician on the hospital medical staff or other referring or consulting physicians involved in my care, at the discretion of the Pathologist or my treating physician. 6.   I consent to the photographing or videotaping of the operations or procedures to be performed, including appropriate portions of my body for medical, scientific, or educational purposes, provided my identity is not revealed by the pictures or by descriptive texts accompanying them. If the procedure has been photographed/videotaped, the surgeon will obtain the original picture, image, videotape or CD.   The hospital will not be responsible for storage, release or maintenance of the picture, image, tape or CD.    7.   I consent to the presence of a  or observers in the operating room as deemed necessary by my physician or their designees. 8.   I recognize that in the event my procedure results in extended X-Ray/fluoroscopy time, I may develop a skin reaction. 9. If I have a Do Not Attempt Resuscitation (DNAR) order in place, that status will be suspended while in the operating room, procedural suite, and during the recovery period unless otherwise explicitly stated by me (or a person authorized to consent on my behalf). The surgeon or my attending physician will determine when the applicable recovery period ends for purposes of reinstating the DNAR order. 10. Patients having a sterilization procedure: I understand that if the procedure is successful the results will be permanent and it will therefore be impossible for me to inseminate, conceive, or bear children. I also understand that the procedure is intended to result in sterility, although the result has not been guaranteed. 11. I acknowledge that my physician has explained sedation/analgesia administration to me including the risk and benefits I consent to the administration of sedation/analgesia as may be necessary or desirable in the judgment of my physician.     I CERTIFY THAT I HAVE READ AND FULLY UNDERSTAND THE ABOVE CONSENT TO OPERATION and/or OTHER PROCEDURE.        ____________________________________       _________________________________      ______________________________  Signature of Patient         Signature of Responsible Person        Printed Name of Responsible Person    ____________________________________      _________________________________      ______________________________       Signature of Witness          Relationship to Patient                       Date                                       Time  Patient Name: Maggie Lenz     : 1945                 Printed: 2022      Medical Record #: [id number] Page 1 of 2         STATEMENT OF PHYSICIAN My signature below affirms that prior to the time of the procedure; I have explained to the patient and/or his/her legal representative, the risks and benefits involved in the proposed treatment and any reasonable alternative to the proposed treatment. I have also explained the risks and benefits involved in refusal of the proposed treatment and alternatives to the proposed treatment and have answered the patient's questions.  If I have a significant financial interest in a co-management agreement or a significant financial interest in any product or implant, or other significant relationship used in this procedure/surgery, I have disclosed this and had a discussion with my patient.     _______________________________________________________________ _____________________________  Jeni Mccollum of Physician)                                                                                         (Date)                                   (Time)  Patient Name: Sheila Goldberg     : 1945                 Printed: 2022      Medical Record #: [id number]                      Page 2 of 2

## (undated) NOTE — LETTER
[facility], [place]  Authorization for Invasive Procedures  1.  I hereby authorize Dr. Adrian Morris / Satish Lindsay, my physician and whomever may be designated as the doctor's assistant, to perform the following operation and/or procedure: potential risks that can occur: fever and allergic reactions, hemolytic reactions, transmission of disease such as hepatitis, AIDS, cytomegalovirus (CMV), and flluid overload.  In the event that I wish to have autologous transfusions of my own blood, or a d judgment of my physician.      Signature of Patient:  ________________________________________________ Date: _________Time: _________    Responsible person in case of minor or unconscious: _____________________________Relationship: ____________     Witness

## (undated) NOTE — LETTER
[address], [place]  Authorization for Invasive Procedures  Date: 12/28/2022           Time: 8988    1. I hereby authorize Dr. Estela Sullivan, my physician and his/her assistants (if applicable), which may include medical students, residents, and/or fellows, to perform the following surgical operation/ procedure and administer such anesthesia as may be determined necessary by my physician:  Operation/Procedure name (s)  Cardiac Catheterization, Left Ventricular Cineangiography, Bilateral Selective Coronary Angiography and/or Right Heart Catheterization; possible Percutaneous Transluminal Coronary Angioplasty, Coronary Atherectomy, Coronary Stent, Intracoronary Thrombolytic therapy, Antiplatelet therapy and/or Intravascular Ultrasound on [facility]   2. I recognize that during the surgical operation/procedure, unforeseen conditions may necessitate additional or different procedures than those listed above. I, therefore, further authorize and request that the above-named surgeon, assistants, or designees perform such procedures as are, in their judgment, necessary and desirable. 3.   My surgeon/physician has discussed prior to my surgery the potential benefits, risks and side effects of this procedure; the likelihood of achieving goals; and potential problems that might occur during recuperation. They also discussed reasonable alternatives to the procedure, including risks, benefits, and side effects related to the alternatives and risks related to not receiving this procedure. I have had all my questions answered and I acknowledge that no guarantee has been made as to the result that may be obtained. 4.   Should the need arise during my operation/procedure, which includes change of level of care prior to discharge, I also consent to the administration of blood and/or blood products.   Further, I understand that despite careful testing and screening of blood or blood products by collecting agencies, I may still be subject to ill effects as a result of receiving a blood transfusion and/or blood products. The following are some, but not all, of the potential risks that can occur: fever and allergic reactions, hemolytic reactions, transmission of diseases such as Hepatitis, AIDS and Cytomegalovirus (CMV) and fluid overload. In the event that I wish to have an autologous transfusion of my own blood, or a directed donor transfusion, I will discuss this with my physician. Check only if Refusing Blood or Blood Products  I understand refusal of blood or blood products as deemed necessary by my physician may have serious consequences to my condition to include possible death. I hereby assume responsibility for my refusal and release the hospital, its personnel, and my physicians from any responsibility for the consequences of my refusal.          o  Refuse      5. I authorize the use of any specimen, organs, tissues, body parts or foreign objects that may be removed from my body during the operation/procedure for diagnosis, research or teaching purposes and their subsequent disposal by hospital authorities. I also authorize the release of specimen test results and/or written reports to my treating physician on the hospital medical staff or other referring or consulting physicians involved in my care, at the discretion of the Pathologist or my treating physician. 6.   I consent to the photographing or videotaping of the operations or procedures to be performed, including appropriate portions of my body for medical, scientific, or educational purposes, provided my identity is not revealed by the pictures or by descriptive texts accompanying them. If the procedure has been photographed/videotaped, the surgeon will obtain the original picture, image, videotape or CD.   The hospital will not be responsible for storage, release or maintenance of the picture, image, tape or CD.    7.   I consent to the presence of a  or observers in the operating room as deemed necessary by my physician or their designees. 8.   I recognize that in the event my procedure results in extended X-Ray/fluoroscopy time, I may develop a skin reaction. 9. If I have a Do Not Attempt Resuscitation (DNAR) order in place, that status will be suspended while in the operating room, procedural suite, and during the recovery period unless otherwise explicitly stated by me (or a person authorized to consent on my behalf). The surgeon or my attending physician will determine when the applicable recovery period ends for purposes of reinstating the DNAR order. 10. Patients having a sterilization procedure: I understand that if the procedure is successful the results will be permanent and it will therefore be impossible for me to inseminate, conceive, or bear children. I also understand that the procedure is intended to result in sterility, although the result has not been guaranteed. 11. I acknowledge that my physician has explained sedation/analgesia administration to me including the risk and benefits I consent to the administration of sedation/analgesia as may be necessary or desirable in the judgment of my physician.     I CERTIFY THAT I HAVE READ AND FULLY UNDERSTAND THE ABOVE CONSENT TO OPERATION and/or OTHER PROCEDURE.        ____________________________________       _________________________________      ______________________________  Signature of Patient         Signature of Responsible Person        Printed Name of Responsible Person    ____________________________________      _________________________________      ______________________________       Signature of Witness          Relationship to Patient                       Date                                       Time  Patient Name: Maggie Morning     : 1945                 Printed: 2022      Medical Record #: [id number] Page 1 of 2         STATEMENT OF PHYSICIAN My signature below affirms that prior to the time of the procedure; I have explained to the patient and/or his/her legal representative, the risks and benefits involved in the proposed treatment and any reasonable alternative to the proposed treatment. I have also explained the risks and benefits involved in refusal of the proposed treatment and alternatives to the proposed treatment and have answered the patient's questions.  If I have a significant financial interest in a co-management agreement or a significant financial interest in any product or implant, or other significant relationship used in this procedure/surgery, I have disclosed this and had a discussion with my patient.     _______________________________________________________________ _____________________________  Brendia Fat of Physician)                                                                                         (Date)                                   (Time)  Patient Name: Olivia Horner     : 1945                 Printed: 2022      Medical Record #: [id number]                      Page 2 of 2

## (undated) NOTE — Clinical Note
Thank you for the consult. I saw Ms. Keen in  the endocrine/diabetes clinic today. Please see attached my note. Please feel free to contact me with any questions. Thanks!

## (undated) NOTE — LETTER
[address] [place] [address]    Consent for Diagnostic Services    Your physician has ordered one of the following tests to determine the function of your heart: . The information obtained will aid your physician in detecting the possible presence of heart disease, to determine why you may have such symptoms such as chest pain or shortness of breath and to determine an appropriate plan of medical management. The risks associated with any exercise test or pharmacological stress test are similar to the risks associated with exercise, including an abnormal blood pressure, dizziness, fainting, abnormal heart rate and in very rear instances heart attach, stroke, or death. During the test you must report any unusual feeling or symptoms you experience during the test. Every effort will be made to minimize such risks by careful observation during the test. Emergency equipment and trained personnel are available to deal with unusual situations, which may arise and these personnel have permission to treat you. During the treadmill or nuclear stress test, the exercise intensity begins at a low level and advances in stages depending on your fitness level. We may stop the test at any time because of signs of fatigue or changes in your heart rate, electrocardiogram, or blood pressure, or other symptoms you may experience. If your doctor has ordered a pharmacological stress test, you may experience a headache, shortness of breath, flushing, warmth, rapid heart rate, or a bitter taste in your mouth. Sometimes you may not experience any symptoms. Your prompt reporting of any symptoms is very important. During a Regadenoson stress test, you are given an injection of Regadenoson. Immediately after the Regadenoson is given, you will be injected with a radioactive isotope. During a Dobutamine stress test, Dobutamine infuses over approximately fifteen minutes.  A radioactive isotope is injected during the infusion. After either pharmacological stress test, you will have either a nuclear scan or an echocardiogram.    The information that is obtained during your testing will be treated as privileged and confidential. The information obtained will be given to your doctor and may be used for insurance purposes or to track and trend data as part of  with your privacy retained. I have read and understand the above information. I voluntarily agree and consent to the above ordered test. Furthermore, no guarantees or assurances have been given by anyone as to the results that may be obtained from this procedure. Any questions that may have occurred to me have been answered to my satisfaction.      Signature of Patient:   ____________________________________________________________    Signature of Witness: _______________________________Date: ___________Time: ________

## (undated) NOTE — Clinical Note
AFSHANI. Patient completed TCM. Patient does not have HFU appt scheduled at this time. NCM attempted to schedule TCM/HFU however, no availability within the timeframe recommended. TE to PCP office re: appointment. Thank you.